# Patient Record
Sex: FEMALE | Race: WHITE | NOT HISPANIC OR LATINO | Employment: OTHER | URBAN - METROPOLITAN AREA
[De-identification: names, ages, dates, MRNs, and addresses within clinical notes are randomized per-mention and may not be internally consistent; named-entity substitution may affect disease eponyms.]

---

## 2021-04-17 ENCOUNTER — APPOINTMENT (OUTPATIENT)
Dept: MRI IMAGING | Facility: HOSPITAL | Age: 61
End: 2021-04-17

## 2021-04-17 ENCOUNTER — HOSPITAL ENCOUNTER (INPATIENT)
Facility: HOSPITAL | Age: 61
LOS: 1 days | Discharge: LEFT BEFORE DISCHARGE | End: 2021-04-18
Attending: FAMILY MEDICINE | Admitting: FAMILY MEDICINE

## 2021-04-17 ENCOUNTER — APPOINTMENT (OUTPATIENT)
Dept: GENERAL RADIOLOGY | Facility: HOSPITAL | Age: 61
End: 2021-04-17

## 2021-04-17 ENCOUNTER — APPOINTMENT (OUTPATIENT)
Dept: CT IMAGING | Facility: HOSPITAL | Age: 61
End: 2021-04-17

## 2021-04-17 DIAGNOSIS — I63.9 CEREBROVASCULAR ACCIDENT (CVA), UNSPECIFIED MECHANISM (HCC): ICD-10-CM

## 2021-04-17 DIAGNOSIS — Q67.0 FACIAL ASYMMETRY: Primary | ICD-10-CM

## 2021-04-17 DIAGNOSIS — R29.898 WEAKNESS OF EXTREMITY: ICD-10-CM

## 2021-04-17 LAB
ABO GROUP BLD: NORMAL
ALBUMIN SERPL-MCNC: 3.6 G/DL (ref 3.5–5.2)
ALBUMIN/GLOB SERPL: 1.2 G/DL
ALP SERPL-CCNC: 79 U/L (ref 39–117)
ALT SERPL W P-5'-P-CCNC: 10 U/L (ref 1–33)
ANION GAP SERPL CALCULATED.3IONS-SCNC: 10 MMOL/L (ref 5–15)
AST SERPL-CCNC: 18 U/L (ref 1–32)
BASOPHILS # BLD AUTO: 0.1 10*3/MM3 (ref 0–0.2)
BASOPHILS NFR BLD AUTO: 0.5 % (ref 0–1.5)
BILIRUB SERPL-MCNC: <0.2 MG/DL (ref 0–1.2)
BLD GP AB SCN SERPL QL: NEGATIVE
BUN SERPL-MCNC: 6 MG/DL (ref 8–23)
BUN/CREAT SERPL: 8 (ref 7–25)
CALCIUM SPEC-SCNC: 9.2 MG/DL (ref 8.6–10.5)
CHLORIDE SERPL-SCNC: 107 MMOL/L (ref 98–107)
CO2 SERPL-SCNC: 27 MMOL/L (ref 22–29)
CREAT SERPL-MCNC: 0.75 MG/DL (ref 0.57–1)
DEPRECATED RDW RBC AUTO: 55.1 FL (ref 37–54)
EOSINOPHIL # BLD AUTO: 0.2 10*3/MM3 (ref 0–0.4)
EOSINOPHIL NFR BLD AUTO: 1.6 % (ref 0.3–6.2)
ERYTHROCYTE [DISTWIDTH] IN BLOOD BY AUTOMATED COUNT: 17.3 % (ref 12.3–15.4)
GFR SERPL CREATININE-BSD FRML MDRD: 79 ML/MIN/1.73
GLOBULIN UR ELPH-MCNC: 3.1 GM/DL
GLUCOSE BLDC GLUCOMTR-MCNC: 95 MG/DL (ref 70–105)
GLUCOSE SERPL-MCNC: 100 MG/DL (ref 65–99)
HCT VFR BLD AUTO: 40.7 % (ref 34–46.6)
HGB BLD-MCNC: 13.7 G/DL (ref 12–15.9)
HOLD SPECIMEN: NORMAL
INR PPP: 0.97 (ref 0.93–1.1)
LYMPHOCYTES # BLD AUTO: 3.2 10*3/MM3 (ref 0.7–3.1)
LYMPHOCYTES NFR BLD AUTO: 25.8 % (ref 19.6–45.3)
MCH RBC QN AUTO: 30.1 PG (ref 26.6–33)
MCHC RBC AUTO-ENTMCNC: 33.7 G/DL (ref 31.5–35.7)
MCV RBC AUTO: 89.4 FL (ref 79–97)
MONOCYTES # BLD AUTO: 0.6 10*3/MM3 (ref 0.1–0.9)
MONOCYTES NFR BLD AUTO: 5.1 % (ref 5–12)
NEUTROPHILS NFR BLD AUTO: 67 % (ref 42.7–76)
NEUTROPHILS NFR BLD AUTO: 8.4 10*3/MM3 (ref 1.7–7)
NRBC BLD AUTO-RTO: 0.1 /100 WBC (ref 0–0.2)
PLATELET # BLD AUTO: 288 10*3/MM3 (ref 140–450)
PMV BLD AUTO: 8.5 FL (ref 6–12)
POTASSIUM SERPL-SCNC: 4.2 MMOL/L (ref 3.5–5.2)
PROT SERPL-MCNC: 6.7 G/DL (ref 6–8.5)
PROTHROMBIN TIME: 10.7 SECONDS (ref 9.6–11.7)
RBC # BLD AUTO: 4.55 10*6/MM3 (ref 3.77–5.28)
RH BLD: POSITIVE
SODIUM SERPL-SCNC: 144 MMOL/L (ref 136–145)
T&S EXPIRATION DATE: NORMAL
WBC # BLD AUTO: 12.5 10*3/MM3 (ref 3.4–10.8)
WHOLE BLOOD HOLD SPECIMEN: NORMAL
WHOLE BLOOD HOLD SPECIMEN: NORMAL

## 2021-04-17 PROCEDURE — 71045 X-RAY EXAM CHEST 1 VIEW: CPT

## 2021-04-17 PROCEDURE — 25010000002 DIPHENHYDRAMINE PER 50 MG: Performed by: NURSE PRACTITIONER

## 2021-04-17 PROCEDURE — 25010000002 LORAZEPAM PER 2 MG: Performed by: NURSE PRACTITIONER

## 2021-04-17 PROCEDURE — 86900 BLOOD TYPING SEROLOGIC ABO: CPT

## 2021-04-17 PROCEDURE — 70551 MRI BRAIN STEM W/O DYE: CPT

## 2021-04-17 PROCEDURE — 25010000002 METOCLOPRAMIDE PER 10 MG: Performed by: NURSE PRACTITIONER

## 2021-04-17 PROCEDURE — 86900 BLOOD TYPING SEROLOGIC ABO: CPT | Performed by: NURSE PRACTITIONER

## 2021-04-17 PROCEDURE — 82746 ASSAY OF FOLIC ACID SERUM: CPT | Performed by: PSYCHIATRY & NEUROLOGY

## 2021-04-17 PROCEDURE — 85610 PROTHROMBIN TIME: CPT | Performed by: NURSE PRACTITIONER

## 2021-04-17 PROCEDURE — 70544 MR ANGIOGRAPHY HEAD W/O DYE: CPT

## 2021-04-17 PROCEDURE — 86901 BLOOD TYPING SEROLOGIC RH(D): CPT | Performed by: NURSE PRACTITIONER

## 2021-04-17 PROCEDURE — 93005 ELECTROCARDIOGRAM TRACING: CPT | Performed by: NURSE PRACTITIONER

## 2021-04-17 PROCEDURE — 82962 GLUCOSE BLOOD TEST: CPT

## 2021-04-17 PROCEDURE — 99284 EMERGENCY DEPT VISIT MOD MDM: CPT

## 2021-04-17 PROCEDURE — 85025 COMPLETE CBC W/AUTO DIFF WBC: CPT | Performed by: NURSE PRACTITIONER

## 2021-04-17 PROCEDURE — 70547 MR ANGIOGRAPHY NECK W/O DYE: CPT

## 2021-04-17 PROCEDURE — 80053 COMPREHEN METABOLIC PANEL: CPT | Performed by: NURSE PRACTITIONER

## 2021-04-17 PROCEDURE — 86850 RBC ANTIBODY SCREEN: CPT | Performed by: NURSE PRACTITIONER

## 2021-04-17 PROCEDURE — 83036 HEMOGLOBIN GLYCOSYLATED A1C: CPT | Performed by: NURSE PRACTITIONER

## 2021-04-17 PROCEDURE — 82607 VITAMIN B-12: CPT | Performed by: PSYCHIATRY & NEUROLOGY

## 2021-04-17 PROCEDURE — 70450 CT HEAD/BRAIN W/O DYE: CPT

## 2021-04-17 PROCEDURE — 86901 BLOOD TYPING SEROLOGIC RH(D): CPT

## 2021-04-17 RX ORDER — SODIUM CHLORIDE 0.9 % (FLUSH) 0.9 %
10 SYRINGE (ML) INJECTION AS NEEDED
Status: DISCONTINUED | OUTPATIENT
Start: 2021-04-17 | End: 2021-04-19 | Stop reason: HOSPADM

## 2021-04-17 RX ORDER — DIPHENHYDRAMINE HYDROCHLORIDE 50 MG/ML
25 INJECTION INTRAMUSCULAR; INTRAVENOUS ONCE
Status: COMPLETED | OUTPATIENT
Start: 2021-04-17 | End: 2021-04-17

## 2021-04-17 RX ORDER — METOCLOPRAMIDE HYDROCHLORIDE 5 MG/ML
10 INJECTION INTRAMUSCULAR; INTRAVENOUS ONCE
Status: COMPLETED | OUTPATIENT
Start: 2021-04-17 | End: 2021-04-17

## 2021-04-17 RX ORDER — LORAZEPAM 2 MG/ML
0.5 INJECTION INTRAMUSCULAR ONCE
Status: COMPLETED | OUTPATIENT
Start: 2021-04-17 | End: 2021-04-17

## 2021-04-17 RX ADMIN — DIPHENHYDRAMINE HYDROCHLORIDE 25 MG: 50 INJECTION, SOLUTION INTRAMUSCULAR; INTRAVENOUS at 20:29

## 2021-04-17 RX ADMIN — SODIUM CHLORIDE 1000 ML: 9 INJECTION, SOLUTION INTRAVENOUS at 20:30

## 2021-04-17 RX ADMIN — LORAZEPAM 0.5 MG: 2 INJECTION INTRAMUSCULAR; INTRAVENOUS at 19:23

## 2021-04-17 RX ADMIN — METOCLOPRAMIDE HYDROCHLORIDE 10 MG: 5 INJECTION INTRAMUSCULAR; INTRAVENOUS at 20:30

## 2021-04-17 NOTE — ED PROVIDER NOTES
Subjective   History: Patient is a 60-year-old female complains of left arm numbness and facial asymmetry since 4/9/2020.  States the facial asymmetry has been constant with numbness to the left arm comes and goes but mainly remains.  States she has had increasing falls over the last week.  3 AM she woke her  up and was having slurred speech.  Reports she has had increased frontal headaches over the last week.  Currently has frontal headache nonradiating ache, constant, not take any medications for pain.  No recent illness cough congestion chest pain short of breath abdominal pain no recent change in medications no blood thinners.  States she had 2 strokes approximately 6 years ago without any residual.  Was not placed on any medications post stroke.      Onset: 4/9/2021  Location: Left side of face   Duration: Constant  Character: Droop  Aggravating/Alleviating factors: None  Radiation not applicable   Severity: Moderate to severe            Review of Systems   Constitutional: Negative for chills, fatigue and fever.   HENT: Negative for congestion, sore throat, tinnitus and trouble swallowing.    Eyes: Negative for photophobia, discharge and visual disturbance.   Respiratory: Negative for cough and shortness of breath.    Cardiovascular: Negative for chest pain.   Gastrointestinal: Negative for abdominal pain, diarrhea, nausea and vomiting.   Genitourinary: Negative for dysuria, frequency and urgency.   Musculoskeletal: Negative for back pain and myalgias.   Skin: Negative for rash.   Neurological: Positive for facial asymmetry, speech difficulty, weakness, numbness and headaches. Negative for dizziness.   Psychiatric/Behavioral: Negative for confusion.       History reviewed. No pertinent past medical history.    Allergies   Allergen Reactions   • Aspirin Other (See Comments)   • Bee Venom Anaphylaxis   • Ciprofloxacin Headache   • Contrast Dye Anaphylaxis   • Levofloxacin Rash   • Meperidine Shortness Of  Breath   • Promethazine Shortness Of Breath   • Pyridium [Phenazopyridine] Unknown - High Severity   • Vancomycin Anaphylaxis   • Azithromycin Rash       Past Surgical History:   Procedure Laterality Date   • GALLBLADDER SURGERY     • HYSTERECTOMY     • JOINT REPLACEMENT     • THYROIDECTOMY, PARTIAL     • TONSILLECTOMY     • TUBAL ABDOMINAL LIGATION         History reviewed. No pertinent family history.    Social History     Socioeconomic History   • Marital status:      Spouse name: Not on file   • Number of children: Not on file   • Years of education: Not on file   • Highest education level: Not on file           Objective   Physical Exam  Vitals reviewed.   Constitutional:       General: She is not in acute distress.     Appearance: She is normal weight.   HENT:      Head: Normocephalic and atraumatic.      Left Ear: Tympanic membrane normal.      Nose: Nose normal.      Mouth/Throat:      Mouth: Mucous membranes are moist.      Pharynx: Oropharynx is clear.   Eyes:      General: No visual field deficit.     Extraocular Movements: Extraocular movements intact.      Pupils: Pupils are equal, round, and reactive to light.   Cardiovascular:      Rate and Rhythm: Normal rate.      Pulses: Normal pulses.      Heart sounds: Normal heart sounds. No murmur heard.     Pulmonary:      Effort: Pulmonary effort is normal.      Breath sounds: Normal breath sounds.   Abdominal:      General: Abdomen is flat. Bowel sounds are normal.      Palpations: Abdomen is soft.   Musculoskeletal:         General: Normal range of motion.      Cervical back: Normal range of motion and neck supple.   Skin:     General: Skin is warm and dry.      Findings: No rash.   Neurological:      Mental Status: She is alert and oriented to person, place, and time.      GCS: GCS eye subscore is 4. GCS verbal subscore is 5. GCS motor subscore is 6.      Cranial Nerves: Cranial nerve deficit, dysarthria and facial asymmetry present.      Sensory:  "Sensory deficit present.      Motor: Weakness and pronator drift present.      Coordination: Finger-Nose-Finger Test and Heel to Shin Test normal.      Comments: Decreased nasolabial fold decreased tightening with closure of bilateral eyes to left eye.  Decreased sensation left upper extremity.  Drift to left arm and left leg without touching the bed.   Psychiatric:         Mood and Affect: Mood normal.         Behavior: Behavior normal.         Thought Content: Thought content normal.         Judgment: Judgment normal.         Procedures           ED Course  /60   Pulse 96   Temp 98 °F (36.7 °C) (Oral)   Resp 14   Ht 175.3 cm (69\")   Wt 80.1 kg (176 lb 9.4 oz)   SpO2 93%   Breastfeeding No   BMI 26.08 kg/m²   Labs Reviewed   COMPREHENSIVE METABOLIC PANEL - Abnormal; Notable for the following components:       Result Value    Glucose 100 (*)     BUN 6 (*)     All other components within normal limits    Narrative:     GFR Normal >60  Chronic Kidney Disease <60  Kidney Failure <15     CBC WITH AUTO DIFFERENTIAL - Abnormal; Notable for the following components:    WBC 12.50 (*)     RDW 17.3 (*)     RDW-SD 55.1 (*)     Neutrophils, Absolute 8.40 (*)     Lymphocytes, Absolute 3.20 (*)     All other components within normal limits   PROTIME-INR - Normal   POCT GLUCOSE FINGERSTICK - Normal   RAINBOW DRAW    Narrative:     The following orders were created for panel order [place] Draw.  Procedure                               Abnormality         Status                     ---------                               -----------         ------                     Light Blue Top[[id number]]                                   Final result               Green Top (Gel)[[id number]]                                                             Lavender Top[[id number]]                                     Final result               Gold Top - SST[[id number]]                                   Final result                 Please " view results for these tests on the individual orders.   POCT GLUCOSE FINGERSTICK   TYPE AND SCREEN   BB ARMBAND CHECK   CBC AND DIFFERENTIAL    Narrative:     The following orders were created for panel order CBC & Differential.  Procedure                               Abnormality         Status                     ---------                               -----------         ------                     CBC Auto Differential[[id number]]        Abnormal            Final result                 Please view results for these tests on the individual orders.   LIGHT BLUE TOP   LAVENDER TOP   GOLD TOP - SST     Medications   sodium chloride 0.9 % flush 10 mL (has no administration in time range)   LORazepam (ATIVAN) injection 0.5 mg (0.5 mg Intravenous Given 4/17/21 1923)   sodium chloride 0.9 % bolus 1,000 mL (1,000 mL Intravenous New Bag 4/17/21 2030)   diphenhydrAMINE (BENADRYL) injection 25 mg (25 mg Intravenous Given 4/17/21 2029)   metoclopramide (REGLAN) injection 10 mg (10 mg Intravenous Given 4/17/21 2030)     MRI Angiogram Head Without Contrast    Result Date: 4/17/2021  Attenuation of the right M2 and more distal branches of the right MCA compatible with occlusion or high-grade stenosis related to recent right MCA infarct. Attenuation of the distal right DESIREE near the region of infarct on recent MRI compatible with occlusion or high-grade stenosis. Tiny 1-2 mm conical outpouching arising from the left internal carotid artery in the expected location of the posterior communicating artery. This commonly reflects the infundibular origin of the posterior communicating artery, although a tiny aneurysm cannot be excluded by MRA. CTA offered for better evaluation as clinically warranted. Electronically signed by:  Chandan Hansen  4/17/2021 9:42 PM    MRI Brain Without Contrast    Result Date: 4/17/2021  1. Diffusion restriction involving the right paramedian frontal lobe (DESIREE territory), the right parietal postcentral  gyrus (MCA territory), the right temporal lobe (MCA territory), and a small focus along the right occipital cortex (PCA territory) favoring acute to early subacute infarcts. Given the distribution and pattern, an embolic phenomena is a concern. There is an approximately 1 cm diameter hemorrhage in the right temporal lobe infarct. 2. Mild-moderate white matter T2 prolongation favoring chronic microvascular ischemic changes, particularly within the right centrum semiovale. 3. Mild-moderate mastoid opacification the right, nonspecific. 4. Remote right frontal lobe infarct changes. These findings were discussed with practitioner Betty on 4/17/2021 7:05 PM ([place] time [place]). Electronically signed by:  Dennis Conway M.D.  4/17/2021 7:10 PM    XR Chest 1 View    Result Date: 4/17/2021  1.Mild cardiomegaly. 2.No radiographic findings of acute pulmonary abnormality.  Electronically Signed By-Yanick Sanchez MD On:4/17/2021 7:07 PM This report was finalized on [id number] by  Yanick Sanchez MD.    CT Head Without Contrast Stroke Protocol    Result Date: 4/17/2021  1. Hyperdensity with surrounding edema in the right temporal lobe. The hyperdensity measures 1.0 x 1.3 cm. Possible considerations include a small hemorrhagic insult or hemorrhagic mass. A cavernous malformation is also a potential etiology. MR brain with and without contrast can provide additional characterization. 2. Right frontal lobe encephalomalacia consistent with a remote insult. Electronically signed by:  Dennis Conway M.D.  4/17/2021 6:55 PM      ED Course as of Apr 18 0139   Sat Apr 17, 2021 2124 Request results were called to me from radiologist including multiple areas of infarct of the right paramedian right parietal temporal and a small focal area of occipital lobe.  Concerning area of hemorrhage to right temporal lobe 1 x 1.3 cm.  Emergently called with neurology on-call, Dr. Caceres spoke with him he wished to have CTA head and neck.   Patient anaphylactic reaction to contrast dye 1986 cardiac arrest has not been premedicated or had IV dye since then.  [facility]-was notified will do MRA head and neck.  MRI team called back in for emergent imaging.  Consult neurosurgeon.    [KJ]   2130 Spoke with Dr. Amaral, neurosurgeon on-call requested a call back after further imaging completed.  He will consult with patient first thing in the a.m.    [KJ]   Sun Apr 18, 2021   0123 Call results to Dr. Amaral as he requested discussed results of MRI with him.  He was informed there was no dye used as patient refused per MRI technician.  He recommended calling [facility]  stroke team to get their opinion on intervention versus conservative treatment and staying here.  I did speak with on-call physician for [facility] stroke team he stated patient was too far out as symptoms have been going on for 9 days and that it was too late for intervention at this time.  Recommended staying at [facility], [place], operated by [facility] for conservative treatment.    [KJ]      ED Course User Index  [KJ] Betty Huber, APRN                                         MDM     I examined the patient using the appropriate personal protective equipment.      DISPOSITION:   Chart Review:  Comorbidity:  has no past medical history on file.    ECG: interpreted by ER physician ron and reviewed by myself: Sinus rhythm rate 72 no ST elevation or ectopy no previous to compare.  Labs: CBC WBC 12.5 CMP glucose 100 otherwise unremarkable.    Imaging: Was interpreted by physician and reviewed by myself:  MRI Angiogram Head Without Contrast    Result Date: 4/17/2021  Attenuation of the right M2 and more distal branches of the right MCA compatible with occlusion or high-grade stenosis related to recent right MCA infarct. Attenuation of the distal right DESIREE near the region of infarct on recent MRI compatible with occlusion or high-grade stenosis. Tiny 1-2 mm conical outpouching arising from the left internal carotid artery  in the expected location of the posterior communicating artery. This commonly reflects the infundibular origin of the posterior communicating artery, although a tiny aneurysm cannot be excluded by MRA. CTA offered for better evaluation as clinically warranted. Electronically signed by:  Chandan Hansen  4/17/2021 9:42 PM    MRI Brain Without Contrast    Result Date: 4/17/2021  1. Diffusion restriction involving the right paramedian frontal lobe (DESIREE territory), the right parietal postcentral gyrus (MCA territory), the right temporal lobe (MCA territory), and a small focus along the right occipital cortex (PCA territory) favoring acute to early subacute infarcts. Given the distribution and pattern, an embolic phenomena is a concern. There is an approximately 1 cm diameter hemorrhage in the right temporal lobe infarct. 2. Mild-moderate white matter T2 prolongation favoring chronic microvascular ischemic changes, particularly within the right centrum semiovale. 3. Mild-moderate mastoid opacification the right, nonspecific. 4. Remote right frontal lobe infarct changes. These findings were discussed with practitioner Betty on 4/17/2021 7:05 PM ([place] time [place]). Electronically signed by:  Dennis Conway M.D.  4/17/2021 7:10 PM    XR Chest 1 View    Result Date: 4/17/2021  1.Mild cardiomegaly. 2.No radiographic findings of acute pulmonary abnormality.  Electronically Signed By-Yanick Sanchez MD On:4/17/2021 7:07 PM This report was finalized on [id number] by  Yanick Sanchez MD.    CT Head Without Contrast Stroke Protocol    Result Date: 4/17/2021  1. Hyperdensity with surrounding edema in the right temporal lobe. The hyperdensity measures 1.0 x 1.3 cm. Possible considerations include a small hemorrhagic insult or hemorrhagic mass. A cavernous malformation is also a potential etiology. MR brain with and without contrast can provide additional characterization. 2. Right frontal lobe encephalomalacia consistent with a  remote insult. Electronically signed by:  Dennis Conway M.D.  4/17/2021 6:55 PM      Disposition/Treatment:    Patient presented to ER with her  after having symptoms since 4/9/2021.  Mainly consisting of facial asymmetry left arm numbness and tingling as well as increased falls.  As of 03 100 this a.m. patient developed slurred speech.  On arrival to ER patient was alert and oriented x3 did complain of a frontal lobe headache that she states she has had on and off all week.  Patient denies any history of drug abuse states she takes gabapentin and recently placed on BuSpar denies any other medications.  Patient chest x-ray shows mild cardiomegaly otherwise negative for any acute pulmonary abnormalities.  CT head showedHyperdensity with surrounding edema in the right temporal lobe. The hyperdensity measures 1.0 x 1.3 cm. Possible considerations include a small hemorrhagic insult or hemorrhagic mass. A cavernous malformation is also a potential etiology.     Right frontal lobe encephalomalacia consistent with a remote insult.  MRI brain without contrast showed multiple areas of infarct including right paramedian right parietal right temporal and small focus area along the occipital region.There is an approximately 1 cm diameter hemorrhage in the right temporal lobe infarct.   Dr. Lopez, neurologist on-call was consulted after MRI was resulted he recommended CTA head and neck but patient had a history of anaphylaxis in 1986 and wanted to cardiac arrest with IV dye.  Therefore MRA head and neck was ordered.  I also spoke with neurosurgeon on-call, Dr. Amaral updated him on patient complaint current results.  He wanted me to call back with the MRA results once completed and read.  According to MR take patient refused contrast for her MRA head and neck therefore both were completed without contrast.  I did inform Dr. Amaral of this when I called him back giving him the results.  MRI results were as  follows:Attenuation of the right M2 and more distal branches of the right MCA compatible with occlusion or high-grade stenosis related to recent right MCA infarct. Attenuation of the distal right DESIREE near the region of infarct on recent MRI compatible with occlusion or high-grade stenosis.Tiny 1-2 mm conical outpouching arising from the left internal carotid artery in the expected location of the posterior communicating artery. This commonly reflects the infundibular origin of the posterior communicating artery, although a tiny aneurysm cannot be excluded by MRA.  Dr. Wolff recommended I call 5 disease, stroke team to get their opinion on possible intervention for embolectomy, stent placement and/or conservative treatment of the patient.  I did speak with on-call [facility] stroke team MD, he stated patient symptoms were too far out and they were not a candidate for intervention at this time recommended conservative treatment.  Patient be admitted for further evaluation by neurology without intervention at this time although patient had multiple areas of infarct noted with an area of hemorrhage to the right temporal lobe 1 x 1.3 cm.  Dr. Amaral is aware that patient may not be transferred and he stated he would check regarding patient status in the a.m., Dr. Caceres also stated he will check for patient status in the a.m.  Patient was admitted to [facility] as patient symptoms remain unchanged throughout course of ER.  She was resting comfortably on reevaluation remained AxO x3.  Final diagnoses:   Facial asymmetry   Weakness of extremity   Cerebrovascular accident (CVA), unspecified mechanism (CMS/HCC)       ED Disposition  ED Disposition     ED Disposition Condition Comment    Decision to Admit  Level of Care: Med/Surg [1]   Diagnosis: Facial asymmetry [558834]   Admitting Physician: MURRAY SINGH [[id number]]   Attending Physician: MURRAY SINGH [[id number]]   Isolate for COVID?: No [0]   Certification: I  Certify That Inpatient Hospital Services Are Medically Necessary For Greater Than 2 Midnights            No follow-up provider specified.       Medication List      No changes were made to your prescriptions during this visit.          Betty Huber, APRN  04/18/21 0524

## 2021-04-18 ENCOUNTER — APPOINTMENT (OUTPATIENT)
Dept: CARDIOLOGY | Facility: HOSPITAL | Age: 61
End: 2021-04-18

## 2021-04-18 VITALS
SYSTOLIC BLOOD PRESSURE: 137 MMHG | HEIGHT: 69 IN | RESPIRATION RATE: 22 BRPM | DIASTOLIC BLOOD PRESSURE: 80 MMHG | OXYGEN SATURATION: 96 % | BODY MASS INDEX: 26.07 KG/M2 | WEIGHT: 176 LBS | HEART RATE: 74 BPM | TEMPERATURE: 98 F

## 2021-04-18 PROBLEM — I63.9 CEREBRAL INFARCT (HCC): Status: ACTIVE | Noted: 2021-04-18

## 2021-04-18 PROBLEM — Q67.0 FACIAL ASYMMETRY: Status: ACTIVE | Noted: 2021-04-18

## 2021-04-18 LAB
AMPHET+METHAMPHET UR QL: NEGATIVE
BACTERIA UR QL AUTO: ABNORMAL /HPF
BARBITURATES UR QL SCN: NEGATIVE
BENZODIAZ UR QL SCN: POSITIVE
BH CV ECHO MEAS - ACS: 1.9 CM
BH CV ECHO MEAS - AO MAX PG (FULL): 0.04 MMHG
BH CV ECHO MEAS - AO MAX PG: 5.7 MMHG
BH CV ECHO MEAS - AO MEAN PG (FULL): 0.21 MMHG
BH CV ECHO MEAS - AO MEAN PG: 3 MMHG
BH CV ECHO MEAS - AO ROOT AREA (BSA CORRECTED): 1.6
BH CV ECHO MEAS - AO ROOT AREA: 7.8 CM^2
BH CV ECHO MEAS - AO ROOT DIAM: 3.2 CM
BH CV ECHO MEAS - AO V2 MAX: 118.9 CM/SEC
BH CV ECHO MEAS - AO V2 MEAN: 81 CM/SEC
BH CV ECHO MEAS - AO V2 VTI: 24.1 CM
BH CV ECHO MEAS - AORTIC HR: 69.6 BPM
BH CV ECHO MEAS - AORTIC R-R: 0.86 SEC
BH CV ECHO MEAS - ASC AORTA: 3.2 CM
BH CV ECHO MEAS - AVA(I,A): 3.2 CM^2
BH CV ECHO MEAS - AVA(I,D): 3.2 CM^2
BH CV ECHO MEAS - AVA(V,A): 3.2 CM^2
BH CV ECHO MEAS - AVA(V,D): 3.2 CM^2
BH CV ECHO MEAS - BSA(HAYCOCK): 2 M^2
BH CV ECHO MEAS - BSA: 2 M^2
BH CV ECHO MEAS - BZI_BMI: 26 KILOGRAMS/M^2
BH CV ECHO MEAS - BZI_METRIC_HEIGHT: 175.3 CM
BH CV ECHO MEAS - BZI_METRIC_WEIGHT: 79.8 KG
BH CV ECHO MEAS - CI(AO): 6.7 L/MIN/M^2
BH CV ECHO MEAS - CI(LVOT): 2.8 L/MIN/M^2
BH CV ECHO MEAS - CO(AO): 13.1 L/MIN
BH CV ECHO MEAS - CO(LVOT): 5.4 L/MIN
BH CV ECHO MEAS - EDV(CUBED): 80.6 ML
BH CV ECHO MEAS - EDV(MOD-SP4): 79.5 ML
BH CV ECHO MEAS - EDV(TEICH): 84 ML
BH CV ECHO MEAS - EF(CUBED): 59.7 %
BH CV ECHO MEAS - EF(MOD-BP): 60 %
BH CV ECHO MEAS - EF(MOD-SP4): 60 %
BH CV ECHO MEAS - EF(TEICH): 51.5 %
BH CV ECHO MEAS - ESV(CUBED): 32.5 ML
BH CV ECHO MEAS - ESV(MOD-SP4): 31.8 ML
BH CV ECHO MEAS - ESV(TEICH): 40.7 ML
BH CV ECHO MEAS - FS: 26.1 %
BH CV ECHO MEAS - IVS/LVPW: 0.93
BH CV ECHO MEAS - IVSD: 1.1 CM
BH CV ECHO MEAS - LA DIMENSION(2D): 3.5 CM
BH CV ECHO MEAS - LA DIMENSION: 3.7 CM
BH CV ECHO MEAS - LA/AO: 1.2
BH CV ECHO MEAS - LV DIASTOLIC VOL/BSA (35-75): 40.6 ML/M^2
BH CV ECHO MEAS - LV MASS(C)D: 179.5 GRAMS
BH CV ECHO MEAS - LV MASS(C)DI: 91.7 GRAMS/M^2
BH CV ECHO MEAS - LV MAX PG: 5.6 MMHG
BH CV ECHO MEAS - LV MEAN PG: 2.8 MMHG
BH CV ECHO MEAS - LV SYSTOLIC VOL/BSA (12-30): 16.2 ML/M^2
BH CV ECHO MEAS - LV V1 MAX: 118.5 CM/SEC
BH CV ECHO MEAS - LV V1 MEAN: 78.8 CM/SEC
BH CV ECHO MEAS - LV V1 VTI: 24.5 CM
BH CV ECHO MEAS - LVIDD: 4.3 CM
BH CV ECHO MEAS - LVIDS: 3.2 CM
BH CV ECHO MEAS - LVOT AREA: 3.2 CM^2
BH CV ECHO MEAS - LVOT DIAM: 2 CM
BH CV ECHO MEAS - LVPWD: 1.2 CM
BH CV ECHO MEAS - MV A MAX VEL: 62.3 CM/SEC
BH CV ECHO MEAS - MV DEC SLOPE: 417.6 CM/SEC^2
BH CV ECHO MEAS - MV DEC TIME: 0.22 SEC
BH CV ECHO MEAS - MV E MAX VEL: 93.7 CM/SEC
BH CV ECHO MEAS - MV E/A: 1.5
BH CV ECHO MEAS - MV MAX PG: 3.1 MMHG
BH CV ECHO MEAS - MV MEAN PG: 1.4 MMHG
BH CV ECHO MEAS - MV V2 MAX: 87.5 CM/SEC
BH CV ECHO MEAS - MV V2 MEAN: 54.6 CM/SEC
BH CV ECHO MEAS - MV V2 VTI: 27.6 CM
BH CV ECHO MEAS - MVA(VTI): 2.8 CM^2
BH CV ECHO MEAS - PA ACC TIME: 0.08 SEC
BH CV ECHO MEAS - PA MAX PG (FULL): 1.3 MMHG
BH CV ECHO MEAS - PA MAX PG: 4.3 MMHG
BH CV ECHO MEAS - PA MEAN PG (FULL): 0.83 MMHG
BH CV ECHO MEAS - PA MEAN PG: 2.3 MMHG
BH CV ECHO MEAS - PA PR(ACCEL): 44.7 MMHG
BH CV ECHO MEAS - PA V2 MAX: 103.1 CM/SEC
BH CV ECHO MEAS - PA V2 MEAN: 72.1 CM/SEC
BH CV ECHO MEAS - PA V2 VTI: 24.7 CM
BH CV ECHO MEAS - PULM A REVS DUR: 0.07 SEC
BH CV ECHO MEAS - PULM A REVS VEL: 21.4 CM/SEC
BH CV ECHO MEAS - PULM DIAS VEL: 41.4 CM/SEC
BH CV ECHO MEAS - PULM S/D: 1.4
BH CV ECHO MEAS - PULM SYS VEL: 59.3 CM/SEC
BH CV ECHO MEAS - RAP SYSTOLE: 3 MMHG
BH CV ECHO MEAS - RV MAX PG: 2.9 MMHG
BH CV ECHO MEAS - RV MEAN PG: 1.5 MMHG
BH CV ECHO MEAS - RV V1 MAX: 85.5 CM/SEC
BH CV ECHO MEAS - RV V1 MEAN: 54.5 CM/SEC
BH CV ECHO MEAS - RV V1 VTI: 17.6 CM
BH CV ECHO MEAS - RVDD: 2.8 CM
BH CV ECHO MEAS - RVSP: 18.6 MMHG
BH CV ECHO MEAS - SI(AO): 96.1 ML/M^2
BH CV ECHO MEAS - SI(CUBED): 24.6 ML/M^2
BH CV ECHO MEAS - SI(LVOT): 39.7 ML/M^2
BH CV ECHO MEAS - SI(MOD-SP4): 24.4 ML/M^2
BH CV ECHO MEAS - SI(TEICH): 22.1 ML/M^2
BH CV ECHO MEAS - SV(AO): 187.9 ML
BH CV ECHO MEAS - SV(CUBED): 48.1 ML
BH CV ECHO MEAS - SV(LVOT): 77.7 ML
BH CV ECHO MEAS - SV(MOD-SP4): 47.7 ML
BH CV ECHO MEAS - SV(TEICH): 43.3 ML
BH CV ECHO MEAS - TR MAX VEL: 187.6 CM/SEC
BH CV XLRA MEAS LEFT CCA RATIO VEL: 73.9 CM/SEC
BH CV XLRA MEAS LEFT DIST CCA EDV: 19.3 CM/SEC
BH CV XLRA MEAS LEFT DIST CCA PSV: 73.9 CM/SEC
BH CV XLRA MEAS LEFT DIST ICA EDV: -24.9 CM/SEC
BH CV XLRA MEAS LEFT DIST ICA PSV: -65.2 CM/SEC
BH CV XLRA MEAS LEFT ICA RATIO VEL: -77.7 CM/SEC
BH CV XLRA MEAS LEFT ICA/CCA RATIO: -1.1
BH CV XLRA MEAS LEFT PROX CCA EDV: 16.2 CM/SEC
BH CV XLRA MEAS LEFT PROX CCA PSV: 73.3 CM/SEC
BH CV XLRA MEAS LEFT PROX ECA PSV: -98.2 CM/SEC
BH CV XLRA MEAS LEFT PROX ICA EDV: -27.3 CM/SEC
BH CV XLRA MEAS LEFT PROX ICA PSV: -77.7 CM/SEC
BH CV XLRA MEAS LEFT PROX SCLA PSV: 180 CM/SEC
BH CV XLRA MEAS LEFT VERTEBRAL A EDV: -15.5 CM/SEC
BH CV XLRA MEAS LEFT VERTEBRAL A PSV: -57.2 CM/SEC
BH CV XLRA MEAS RIGHT CCA RATIO VEL: 80.8 CM/SEC
BH CV XLRA MEAS RIGHT DIST CCA EDV: 11.2 CM/SEC
BH CV XLRA MEAS RIGHT DIST CCA PSV: 62.1 CM/SEC
BH CV XLRA MEAS RIGHT DIST ICA EDV: -25.2 CM/SEC
BH CV XLRA MEAS RIGHT DIST ICA PSV: -102 CM/SEC
BH CV XLRA MEAS RIGHT ICA RATIO VEL: -120 CM/SEC
BH CV XLRA MEAS RIGHT ICA/CCA RATIO: -1.5
BH CV XLRA MEAS RIGHT PROX CCA EDV: 13.7 CM/SEC
BH CV XLRA MEAS RIGHT PROX CCA PSV: 80.8 CM/SEC
BH CV XLRA MEAS RIGHT PROX ECA PSV: -139 CM/SEC
BH CV XLRA MEAS RIGHT PROX ICA EDV: -28.6 CM/SEC
BH CV XLRA MEAS RIGHT PROX ICA PSV: -120 CM/SEC
BH CV XLRA MEAS RIGHT PROX SCLA PSV: 155 CM/SEC
BH CV XLRA MEAS RIGHT VERTEBRAL A EDV: -10.3 CM/SEC
BH CV XLRA MEAS RIGHT VERTEBRAL A PSV: -42.3 CM/SEC
BILIRUB UR QL STRIP: NEGATIVE
CANNABINOIDS SERPL QL: NEGATIVE
CHOLEST SERPL-MCNC: 120 MG/DL (ref 0–200)
CLARITY UR: ABNORMAL
COCAINE UR QL: NEGATIVE
COLOR UR: YELLOW
ETHANOL UR QL: <0.01 %
FOLATE SERPL-MCNC: 5.68 NG/ML (ref 4.78–24.2)
GLUCOSE UR STRIP-MCNC: NEGATIVE MG/DL
HDLC SERPL-MCNC: 44 MG/DL (ref 40–60)
HGB UR QL STRIP.AUTO: NEGATIVE
HYALINE CASTS UR QL AUTO: ABNORMAL /LPF
KETONES UR QL STRIP: NEGATIVE
LDLC SERPL CALC-MCNC: 58 MG/DL (ref 0–100)
LDLC/HDLC SERPL: 1.3 {RATIO}
LEUKOCYTE ESTERASE UR QL STRIP.AUTO: ABNORMAL
METHADONE UR QL SCN: NEGATIVE
NITRITE UR QL STRIP: POSITIVE
OPIATES UR QL: NEGATIVE
OXYCODONE UR QL SCN: NEGATIVE
PH UR STRIP.AUTO: 6 [PH] (ref 5–8)
PROT UR QL STRIP: NEGATIVE
QT INTERVAL: 382 MS
RBC # UR: ABNORMAL /HPF
REF LAB TEST METHOD: ABNORMAL
SARS-COV-2 ORF1AB RESP QL NAA+PROBE: NOT DETECTED
SP GR UR STRIP: 1.01 (ref 1–1.03)
SQUAMOUS #/AREA URNS HPF: ABNORMAL /HPF
TRIGL SERPL-MCNC: 94 MG/DL (ref 0–150)
TSH SERPL DL<=0.05 MIU/L-ACNC: 2.51 UIU/ML (ref 0.27–4.2)
UROBILINOGEN UR QL STRIP: ABNORMAL
VIT B12 BLD-MCNC: 346 PG/ML (ref 211–946)
VLDLC SERPL-MCNC: 18 MG/DL (ref 5–40)
WBC UR QL AUTO: ABNORMAL /HPF
WHOLE BLOOD HOLD SPECIMEN: NORMAL

## 2021-04-18 PROCEDURE — 93306 TTE W/DOPPLER COMPLETE: CPT

## 2021-04-18 PROCEDURE — 80307 DRUG TEST PRSMV CHEM ANLYZR: CPT | Performed by: NURSE PRACTITIONER

## 2021-04-18 PROCEDURE — 82077 ASSAY SPEC XCP UR&BREATH IA: CPT | Performed by: NURSE PRACTITIONER

## 2021-04-18 PROCEDURE — 87088 URINE BACTERIA CULTURE: CPT | Performed by: NURSE PRACTITIONER

## 2021-04-18 PROCEDURE — 84443 ASSAY THYROID STIM HORMONE: CPT | Performed by: NURSE PRACTITIONER

## 2021-04-18 PROCEDURE — 99222 1ST HOSP IP/OBS MODERATE 55: CPT | Performed by: PSYCHIATRY & NEUROLOGY

## 2021-04-18 PROCEDURE — 87186 SC STD MICRODIL/AGAR DIL: CPT | Performed by: NURSE PRACTITIONER

## 2021-04-18 PROCEDURE — U0005 INFEC AGEN DETEC AMPLI PROBE: HCPCS | Performed by: FAMILY MEDICINE

## 2021-04-18 PROCEDURE — 81001 URINALYSIS AUTO W/SCOPE: CPT | Performed by: NURSE PRACTITIONER

## 2021-04-18 PROCEDURE — 92610 EVALUATE SWALLOWING FUNCTION: CPT

## 2021-04-18 PROCEDURE — 80061 LIPID PANEL: CPT | Performed by: NURSE PRACTITIONER

## 2021-04-18 PROCEDURE — 99223 1ST HOSP IP/OBS HIGH 75: CPT | Performed by: FAMILY MEDICINE

## 2021-04-18 PROCEDURE — 87086 URINE CULTURE/COLONY COUNT: CPT | Performed by: NURSE PRACTITIONER

## 2021-04-18 PROCEDURE — 93306 TTE W/DOPPLER COMPLETE: CPT | Performed by: INTERNAL MEDICINE

## 2021-04-18 PROCEDURE — 93880 EXTRACRANIAL BILAT STUDY: CPT

## 2021-04-18 PROCEDURE — U0004 COV-19 TEST NON-CDC HGH THRU: HCPCS | Performed by: FAMILY MEDICINE

## 2021-04-18 RX ORDER — BUSPIRONE HYDROCHLORIDE 10 MG/1
10 TABLET ORAL 3 TIMES DAILY
COMMUNITY

## 2021-04-18 RX ORDER — IPRATROPIUM BROMIDE AND ALBUTEROL SULFATE 2.5; .5 MG/3ML; MG/3ML
3 SOLUTION RESPIRATORY (INHALATION)
Status: DISCONTINUED | OUTPATIENT
Start: 2021-04-18 | End: 2021-04-19 | Stop reason: HOSPADM

## 2021-04-18 RX ORDER — SODIUM CHLORIDE 0.9 % (FLUSH) 0.9 %
3-10 SYRINGE (ML) INJECTION AS NEEDED
Status: DISCONTINUED | OUTPATIENT
Start: 2021-04-18 | End: 2021-04-19 | Stop reason: HOSPADM

## 2021-04-18 RX ORDER — BUSPIRONE HYDROCHLORIDE 10 MG/1
10 TABLET ORAL 3 TIMES DAILY
Status: DISCONTINUED | OUTPATIENT
Start: 2021-04-18 | End: 2021-04-19 | Stop reason: HOSPADM

## 2021-04-18 RX ORDER — ALBUTEROL SULFATE 90 UG/1
2 AEROSOL, METERED RESPIRATORY (INHALATION) EVERY 4 HOURS PRN
COMMUNITY

## 2021-04-18 RX ORDER — BUTALBITAL, ACETAMINOPHEN AND CAFFEINE 50; 325; 40 MG/1; MG/1; MG/1
2 TABLET ORAL EVERY 6 HOURS PRN
Status: DISCONTINUED | OUTPATIENT
Start: 2021-04-18 | End: 2021-04-19 | Stop reason: HOSPADM

## 2021-04-18 RX ORDER — SODIUM CHLORIDE 9 MG/ML
100 INJECTION, SOLUTION INTRAVENOUS CONTINUOUS
Status: DISCONTINUED | OUTPATIENT
Start: 2021-04-18 | End: 2021-04-19 | Stop reason: HOSPADM

## 2021-04-18 RX ORDER — ONDANSETRON 2 MG/ML
4 INJECTION INTRAMUSCULAR; INTRAVENOUS EVERY 6 HOURS PRN
Status: DISCONTINUED | OUTPATIENT
Start: 2021-04-18 | End: 2021-04-19 | Stop reason: HOSPADM

## 2021-04-18 RX ORDER — BISACODYL 10 MG
10 SUPPOSITORY, RECTAL RECTAL DAILY PRN
Status: DISCONTINUED | OUTPATIENT
Start: 2021-04-18 | End: 2021-04-19 | Stop reason: HOSPADM

## 2021-04-18 RX ORDER — ATORVASTATIN CALCIUM 40 MG/1
80 TABLET, FILM COATED ORAL NIGHTLY
Status: DISCONTINUED | OUTPATIENT
Start: 2021-04-18 | End: 2021-04-18

## 2021-04-18 RX ORDER — GABAPENTIN 800 MG/1
800 TABLET ORAL 4 TIMES DAILY
COMMUNITY

## 2021-04-18 RX ORDER — SODIUM CHLORIDE 0.9 % (FLUSH) 0.9 %
3 SYRINGE (ML) INJECTION EVERY 12 HOURS SCHEDULED
Status: DISCONTINUED | OUTPATIENT
Start: 2021-04-18 | End: 2021-04-19 | Stop reason: HOSPADM

## 2021-04-18 RX ORDER — ATORVASTATIN CALCIUM 40 MG/1
40 TABLET, FILM COATED ORAL NIGHTLY
Status: DISCONTINUED | OUTPATIENT
Start: 2021-04-18 | End: 2021-04-19 | Stop reason: HOSPADM

## 2021-04-18 RX ORDER — LORAZEPAM 0.5 MG/1
0.5 TABLET ORAL ONCE
Status: COMPLETED | OUTPATIENT
Start: 2021-04-18 | End: 2021-04-18

## 2021-04-18 RX ORDER — ALBUTEROL SULFATE 2.5 MG/3ML
2.5 SOLUTION RESPIRATORY (INHALATION) EVERY 4 HOURS PRN
Status: DISCONTINUED | OUTPATIENT
Start: 2021-04-18 | End: 2021-04-19 | Stop reason: HOSPADM

## 2021-04-18 RX ORDER — ACETAMINOPHEN 325 MG/1
650 TABLET ORAL EVERY 6 HOURS PRN
Status: DISCONTINUED | OUTPATIENT
Start: 2021-04-18 | End: 2021-04-19 | Stop reason: HOSPADM

## 2021-04-18 RX ORDER — ACETAMINOPHEN AND CODEINE PHOSPHATE 300; 30 MG/1; MG/1
1 TABLET ORAL 3 TIMES DAILY PRN
Status: ON HOLD | COMMUNITY
End: 2021-06-28 | Stop reason: SDUPTHER

## 2021-04-18 RX ORDER — ALBUTEROL SULFATE 90 UG/1
2 AEROSOL, METERED RESPIRATORY (INHALATION) EVERY 4 HOURS PRN
Status: DISCONTINUED | OUTPATIENT
Start: 2021-04-18 | End: 2021-04-18

## 2021-04-18 RX ORDER — LORAZEPAM 0.5 MG/1
0.5 TABLET ORAL EVERY 12 HOURS PRN
Status: DISCONTINUED | OUTPATIENT
Start: 2021-04-18 | End: 2021-04-19 | Stop reason: HOSPADM

## 2021-04-18 RX ADMIN — SODIUM CHLORIDE, PRESERVATIVE FREE 3 ML: 5 INJECTION INTRAVENOUS at 20:30

## 2021-04-18 RX ADMIN — BUSPIRONE HYDROCHLORIDE 10 MG: 10 TABLET ORAL at 17:35

## 2021-04-18 RX ADMIN — ATORVASTATIN CALCIUM 80 MG: 40 TABLET, FILM COATED ORAL at 03:37

## 2021-04-18 RX ADMIN — LORAZEPAM 0.5 MG: 0.5 TABLET ORAL at 17:35

## 2021-04-18 RX ADMIN — SODIUM CHLORIDE 100 ML/HR: 0.9 INJECTION, SOLUTION INTRAVENOUS at 04:11

## 2021-04-18 RX ADMIN — BUTALBITAL, ACETAMINOPHEN AND CAFFEINE 2 TABLET: 50; 325; 40 TABLET ORAL at 20:30

## 2021-04-18 RX ADMIN — BUTALBITAL, ACETAMINOPHEN AND CAFFEINE 2 TABLET: 50; 325; 40 TABLET ORAL at 09:06

## 2021-04-18 RX ADMIN — ACETAMINOPHEN 650 MG: 325 TABLET ORAL at 04:16

## 2021-04-18 RX ADMIN — LORAZEPAM 0.5 MG: 0.5 TABLET ORAL at 09:24

## 2021-04-18 RX ADMIN — BUSPIRONE HYDROCHLORIDE 10 MG: 10 TABLET ORAL at 20:27

## 2021-04-18 RX ADMIN — BUSPIRONE HYDROCHLORIDE 10 MG: 10 TABLET ORAL at 09:06

## 2021-04-18 RX ADMIN — SODIUM CHLORIDE, PRESERVATIVE FREE 3 ML: 5 INJECTION INTRAVENOUS at 04:10

## 2021-04-18 RX ADMIN — ATORVASTATIN CALCIUM 40 MG: 40 TABLET, FILM COATED ORAL at 20:27

## 2021-04-18 RX ADMIN — SODIUM CHLORIDE, PRESERVATIVE FREE 3 ML: 5 INJECTION INTRAVENOUS at 09:06

## 2021-04-18 RX ADMIN — BUTALBITAL, ACETAMINOPHEN AND CAFFEINE 2 TABLET: 50; 325; 40 TABLET ORAL at 14:38

## 2021-04-18 NOTE — THERAPY EVALUATION
Acute Care - Speech Language Pathology   Swallow Initial Evaluation  Ehsan     Patient Name: Magdalena Gamboa  : 1960  MRN: [id number]  Today's Date: 2021               Admit Date: 2021    Visit Dx:     ICD-10-CM ICD-9-CM   1. Facial asymmetry  Q67.0 754.0   2. Weakness of extremity  R29.898 729.89   3. Cerebrovascular accident (CVA), unspecified mechanism (CMS/HCC)  I63.9 434.91     Patient Active Problem List   Diagnosis   • Facial asymmetry   • Cerebral infarct (CMS/HCC)     Past Medical History:   Diagnosis Date   • AMI (acute myocardial infarction) (CMS/HCC)    • Chronic back pain    • Mood disorder (CMS/HCC)    • Stroke (CMS/HCC)    • Substance abuse (CMS/HCC)      Past Surgical History:   Procedure Laterality Date   • CHOLECYSTECTOMY     • GALLBLADDER SURGERY     • HYSTERECTOMY     • JOINT REPLACEMENT     • THYROIDECTOMY, PARTIAL     • TONSILLECTOMY     • TUBAL ABDOMINAL LIGATION          SWALLOW EVALUATION (last 72 hours)      SLP Adult Swallow Evaluation     Row Name 21 0800       Rehab Evaluation    Document Type  evaluation  -AB    Subjective Information  complains of headache  -AB    Patient Observations  alert;cooperative;agree to therapy  -AB    Patient/Family/Caregiver Comments/Observations  pt up in bed, pleasant, cooperative  -AB    Care Plan Review  evaluation/treatment results reviewed;care plan/treatment goals reviewed;risks/benefits reviewed;current/potential barriers reviewed;patient/other agree to care plan  -AB    Patient Effort  good  -AB    Symptoms Noted During/After Treatment  none  -AB       General Information    Patient Profile Reviewed  yes  -AB    Pertinent History Of Current Problem  60 y.o. female admitted w/L sided weakness, slurred speech. MRI revealing diffusion restriction involving R paramedial frontal lobe, R parietal postrcentral gyrus, R temporal lobe, R occipital cortex. Pt endorses prior hc of CVA in [place], w/o significant residuals, memory  worsening since that time period  -AB    Current Method of Nutrition  NPO  -AB    Precautions/Limitations, Vision  WFL reports no visual involvement  -AB    Precautions/Limitations, Hearing  WFL  -AB    Prior Level of Function-Communication  WFL reports mild memory impairments  -AB    Prior Level of Function-Swallowing  no diet consistency restrictions;regular textures;thin liquids  -AB    Plans/Goals Discussed with  patient;agreed upon  -AB    Barriers to Rehab  none identified  -AB       Pain    Additional Documentation  Pain Scale: Numbers Pre/Post-Treatment (Group)  -AB       Pain Scale: Numbers Pre/Post-Treatment    Pretreatment Pain Rating  8/10  -AB    Posttreatment Pain Rating  8/10  -AB    Pain Location  head  -AB       Oral Motor Structure and Function    Oral Lesions or Structural Abnormalities and/or variants  None  -AB    Dentition Assessment  upper dentures/partial in place;edentulous maxillary endentulous  -AB    Secretion Management  WNL/WFL  -AB    Mucosal Quality  moist, healthy  -AB    Gag Response  -- DNA  -AB    Volitional Swallow  WFL  -AB    Volitional Cough  WFL  -AB       Oral Musculature and Cranial Nerve Assessment    Oral Motor General Assessment  oral labial or buccal impairment;lingual impairment  -AB    Oral Labial or Buccal Impairment, Detail, Cranial Nerve VII (Facial):  left labial droop  -AB    Lingual Impairment, Detail. Cranial Nerves IX, XII (Glossopharyngeal and Hypoglossal)  reduced strength right  -AB       Clinical Swallow Eval    Clinical Swallow Evaluation Summary  Bedside swallow evaluation completed. Pt initially passed in ED, however pronounced L facial droop documented and remains present upon eval today. Oral phase mildly impaired. Mastication and A-P transport are timely and efficient. No oral residuals. Mild L anterior loss with regular solids, presumably secondary to weakness, decreased mandibular ROM. Pharyngeal phase with digital palpation performed, suggestive  of timely initiation. No overt s/s aspiration across all trials.   -AB       Clinical Impression    SLP Swallowing Diagnosis  mild;oral dysphagia  -AB    Functional Impact  risk of aspiration/pneumonia  -AB    Rehab Potential/Prognosis, Swallowing  good, to achieve stated therapy goals  -AB    Swallow Criteria for Skilled Therapeutic Interventions Met  demonstrates skilled criteria  -AB       Recommendations    Therapy Frequency (Swallow)  1 day per week  -AB    Predicted Duration Therapy Intervention (Days)  1 week  -AB    SLP Diet Recommendation  regular textures;thin liquids  -AB    Recommended Diagnostics  SLE/Cog/Motor Speech Evaluation diet tolerance x1  -AB    Recommended Precautions and Strategies  upright posture during/after eating;small bites of food and sips of liquid  -AB    Oral Care Recommendations  Oral Care BID/PRN  -AB    SLP Rec. for Method of Medication Administration  with thin liquids  -AB    Monitor for Signs of Aspiration  yes;notify SLP if any concerns  -AB    Anticipated Discharge Disposition (SLP)  unknown  -AB       Swallow Goals (SLP)    Oral Nutrition/Hydration Goal Selection (SLP)  oral nutrition/hydration, SLP goal 1  -AB       Oral Nutrition/Hydration Goal 1 (SLP)    Oral Nutrition/Hydration Goal 1, SLP  Tolerate L/R diet without oral stage difficulties or complications associated with aspiration   -AB    Time Frame (Oral Nutrition/Hydration Goal 1, SLP)  short term goal (STG)  -AB      User Key  (r) = Recorded By, (t) = Taken By, (c) = Cosigned By    Initials Name Effective Dates    Marge Garcia, MS CCC-SLP 10/05/20 -           EDUCATION  The patient has been educated in the following areas:   Dysphagia (Swallowing Impairment).    SLP Recommendation and Plan  Recommend: regular and thin liquid diet. Medications: with thin liquids. Compensations: small bites/sips, upright for all PO.   SLP recommends dysphagia follow up x1 secondary to mild oral impairment and to reinforce  carryover of strategies.   Speech language cognitive evaluation appears indicated-pt reports increased memory problems since prior CVA. Speech with decreased intensity and articulatory precision, however pt states this is baseline. Pt declines cognitive workup this date secondary to severe head pain-notifies neurologist as SLP exits room.    SLP GOALS     Row Name 04/18/21 0800             Oral Nutrition/Hydration Goal 1 (SLP)    Oral Nutrition/Hydration Goal 1, SLP  Tolerate L/R diet without oral stage difficulties or complications associated with aspiration   -AB      Time Frame (Oral Nutrition/Hydration Goal 1, SLP)  short term goal (STG)  -AB        User Key  (r) = Recorded By, (t) = Taken By, (c) = Cosigned By    Initials Name Provider Type    AB Marge Mendez, MS CCC-SLP Speech and Language Pathologist             Time Calculation:       Therapy Charges for Today     Code Description Service Date Service Provider Modifiers Qty    [id number]  ST EVAL ORAL PHARYNG SWALLOW 4 4/18/2021 Marge Mendez, MS CCC-SLP GN 1        PPE:  Patient was not wearing a face mask during this therapy encounter. Therapist used appropriate personal protective equipment including mask, eye protection and gloves.  Mask used was standard procedure mask. Appropriate PPE was worn during the entire therapy session. The patient coughed during this evaluation. Therapist was within 6 feet for 15 minutes or more during the evaluation. Hand hygiene was completed before and after therapy session. Patient is not in enhanced droplet precautions.            Marge Mendez MS CCC-SLP  4/18/2021

## 2021-04-18 NOTE — PLAN OF CARE
Goal Outcome Evaluation:  Plan of Care Reviewed With: patient  Progress: no change  Outcome Summary: Bedside swallow evaluation completed. Pt initially passed in ED, however pronounced L facial droop documented and remains present upon eval today. Oral phase mildly impaired. Mastication and A-P transport are timely and efficient. No oral residuals. Mild L anterior loss with regular solids, presumably secondary to weakness, decreased mandibular ROM. Pharyngeal phase with digital palpation performed, suggestive of timely initiation. No overt s/s aspiration across all trials.     Recommend: regular and thin liquid diet. Medications: with thin liquids. Compensations: small bites/sips, upright for all PO.     SLP recommends dysphagia follow up x1 secondary to mild oral impairment and to reinforce carryover of strategies.     Speech language cognitive evaluation appears indicated-pt reports increased memory problems since prior CVA w/new R MCA infarct. Speech with decreased intensity and articulatory precision, however pt states this is baseline. Pt declines cognitive workup this date secondary to severe head pain-notifies neurologist as SLP exits room.

## 2021-04-18 NOTE — PLAN OF CARE
Problem: Fall Injury Risk  Goal: Absence of Fall and Fall-Related Injury  Outcome: Ongoing, Not Progressing  Intervention: Promote Injury-Free Environment  Recent Flowsheet Documentation  Taken 4/18/2021 0258 by Aminah Woodruff, RN  Safety Promotion/Fall Prevention:   safety round/check completed   room organization consistent   lighting adjusted   fall prevention program maintained   clutter free environment maintained   assistive device/personal items within reach     Problem: Fall Injury Risk  Goal: Absence of Fall and Fall-Related Injury  4/18/2021 0512 by Aminah Woodruff, RN  Outcome: Ongoing, Not Progressing  4/18/2021 0511 by Aminah Woodruff, RN  Outcome: Ongoing, Not Progressing  Intervention: Promote Injury-Free Environment  Recent Flowsheet Documentation  Taken 4/18/2021 0258 by Aminah Woodruff, RN  Safety Promotion/Fall Prevention:   safety round/check completed   room organization consistent   lighting adjusted   fall prevention program maintained   clutter free environment maintained   assistive device/personal items within reach   Goal Outcome Evaluation:  Plan of Care Reviewed With: patient  Progress: no change

## 2021-04-18 NOTE — CONSULTS
Primary Care Provider: Provider, No Known     Consult requested by: DIXIE Blakely    Reason for Consultation: Neurological evaluation/strokes  History taken from: patient chart    Chief complaint: Left arm weakness       SUBJECTIVE:    History of present illness:    The patient is a very pleasant 60-year-old right-handed white female who is having some problems since April 9 but yesterday things got worse and she told her boyfriend who told her to come to the emergency room    She was evaluated as a possible stroke and she ended up with a head CT which showed some changes and this was followed by MRI which shows multiple lesions on the right side both in the DESIREE and MCA territory.  There are signs of diffusion-weighted images changes representing stroke but there is an area in the temporal lobe which could be other possibilities and there is hemorrhage.  Neurosurgery has been consulted    She has severe allergy to IV contrast as per the patient so MRA of the head and neck was done and the MRI of the head was impressive with distal M1 and M2 showing severe narrowing or decreased flow and also the MRA of the neck was really not diagnostic considering so much artifact    Her symptoms are headaches, left facial weakness and left arm weakness and she denies any leg weakness but she does have some leg weakness    She denies IV drugs  She says that in her youth she has used marijuana  She is allergic to aspirin  No seizures        As per admitting, 60-year-old female presented with complaints of intermittent left arm numbness, left leg weakness and facial asymmetry since April 9, 2021.  She also reports frequent falls the past week but denies any injury.  She reports she awakened her partner at 3 AM and was having slurred speech.  She also reports she has a moderate frontal headache.  She denies any visual changes, syncope, chest pain or dizziness.  Upon exam she does have a left-sided facial asymmetry but  "is awake alert and answers appropriately.  She reports a past medical history of a stroke about 5 or 6 years ago with no residual disability.  She also reports she is allergic to contrast media and had a cardiac arrest as a result of receiving IV contrast dye.  Following diagnostics were performed:     CT head     \"1. Hyperdensity with surrounding edema in the right temporal lobe. The hyperdensity measures 1.0 x 1.3 cm. Possible considerations include a small hemorrhagic insult or hemorrhagic mass. A cavernous malformation is also a potential etiology. MR brain   with and without contrast can provide additional characterization.   2. Right frontal lobe encephalomalacia consistent with a remote insult.   \"     MRA of the head: An area in the temporal lobe region which has some hemorrhagic change in its rounded in the center     \"IMPRESSION:  Attenuation of the right M2 and more distal branches of the right MCA compatible with occlusion or high-grade stenosis related to recent right MCA infarct.    Attenuation of the distal right DESIREE near the region of infarct on recent MRI compatible with occlusion or high-grade stenosis.   Tiny 1-2 mm conical outpouching arising from the left internal carotid artery in the expected location of the posterior communicating artery. This commonly reflects the infundibular origin of the posterior communicating artery, although a tiny aneurysm   cannot be excluded by MRA.\"      MRA of the neck:     \"IMPRESSION:  Motion degraded examination.   Limited evaluation of the proximal internal carotid arteries due to motion where high-grade stenoss cannot be excluded. CTA recommended for better evaluation in this person with recent right anterior circulation infarct.   Elsewhere, evidence of moderate or severe stenosis, occlusion, or dissection of the visualized segments of the carotid or vertebral arteries in the neck.\"     MRI brain:     \"IMPRESSION:      1. Diffusion restriction involving the " "right paramedian frontal lobe (DESIREE territory), the right parietal postcentral gyrus (MCA territory), the right temporal lobe (MCA territory), and a small focus along the right occipital cortex (PCA territory)   favoring acute to early subacute infarcts. Given the distribution and pattern, an embolic phenomena is a concern. There is an approximately 1 cm diameter hemorrhage in the right temporal lobe infarct.   2. Mild-moderate white matter T2 prolongation favoring chronic microvascular ischemic changes, particularly within the right centrum semiovale.   3. Mild-moderate mastoid opacification the right, nonspecific.  4. Remote right frontal lobe infarct changes.     Chest x-ray:     \"IMPRESSION:  1.Mild cardiomegaly.  2.No radiographic findings of acute pulmonary abnormality.\"     Both Dr. Jordan of neurology and Dr. Amaral of neurosurgery were consulted from the emergency department.  [facility] was consulted and given timeframe of onset of symptoms and presentation, conservative management here was advised.     Labs show WBC of 12.5, she is afebrile.  Urinalysis pending.  She will be admitted for further evaluation and treatment with neurology and neurosurgery following.          Review of Systems   No fever chills rigors or sweats  No weight issues  No sleep problems  HEENT:  No speech problem, vision changes, facial asymmetry or pain, or neck problem but mild headaches  Chest: No chest pain, clubbing, cyanosis, orthopnea palpitations  Pulmonary:  No shortness of air, cough or expectoration  Abdomen:  No swelling/tension, constipation,diarrhea or pain  No genitourinary symptoms  Extremity problems as discussed  Chronic back problems  No psychotic issues  Neurologic issues as discussed  No hematologic, dermatologic or endocrine problems          PATIENT HISTORY:  Past Medical History:   Diagnosis Date   • AMI (acute myocardial infarction) (CMS/[facility])    • Chronic back pain    • Mood disorder (CMS/[facility]) "    • Stroke (CMS/[facility])    • Substance abuse (CMS/[facility])    ,   Past Surgical History:   Procedure Laterality Date   • CHOLECYSTECTOMY     • GALLBLADDER SURGERY     • HYSTERECTOMY     • JOINT REPLACEMENT     • THYROIDECTOMY, PARTIAL     • TONSILLECTOMY     • TUBAL ABDOMINAL LIGATION     ,   Family History   Problem Relation Age of Onset   • Heart disease Sister    ,   Social History     Tobacco Use   • Smoking status: Current Every Day Smoker     Packs/day: 1.00   • Smokeless tobacco: Never Used   • Tobacco comment: not interested   Vaping Use   • Vaping Use: Some days   • Substances: Nicotine   • Devices: Disposable, Pre-filled or refillable cartridge   Substance Use Topics   • Alcohol use: Not Currently   • Drug use: Not Currently   ,   Medications Prior to Admission   Medication Sig Dispense Refill Last Dose   • acetaminophen-codeine (TYLENOL #3) 300-30 MG per tablet Take 1 tablet by mouth 3 (Three) Times a Day As Needed for Moderate Pain .   4/17/2021 at Unknown time   • albuterol sulfate  (90 Base) MCG/ACT inhaler Inhale 2 puffs Every 4 (Four) Hours As Needed for Wheezing.   4/17/2021 at Unknown time   • busPIRone (BUSPAR) 10 MG tablet Take 10 mg by mouth 3 (Three) Times a Day.   4/17/2021 at Unknown time   • gabapentin (NEURONTIN) 800 MG tablet Take 800 mg by mouth 4 (Four) Times a Day.   4/17/2021 at Unknown time   • umeclidinium-vilanterol (Anoro Ellipta) 62.5-25 MCG/INH aerosol powder  inhaler Inhale 1 puff Daily.   4/17/2021 at Unknown time   , Scheduled Meds:  atorvastatin, 80 mg, Oral, Nightly  busPIRone, 10 mg, Oral, TID  ipratropium-albuterol, 3 mL, Nebulization, Q8H - RT  sodium chloride, 3 mL, Intravenous, Q12H    , Continuous Infusions:  sodium chloride, 100 mL/hr, Last Rate: 100 mL/hr (04/18/21 0411)    , PRN Meds:  •  acetaminophen  •  albuterol  •  bisacodyl  •  ondansetron  •  sodium chloride  •  sodium chloride, Allergies:  Aspirin, Bee venom, Ciprofloxacin, Contrast dye, Levofloxacin,  Meperidine, Promethazine, Pyridium [phenazopyridine], Vancomycin, and Azithromycin    ________________________________________________________        OBJECTIVE:  Upon today's exam, patient is complaining of headaches but otherwise stable      Neurologic Exam    The patient is awake and alert and oriented x3  Normal speech but she is a bit concerned about her headaches     Cranial nerve examination demonstrate:  Full fields of vision to confrontation  Pupils are round, reactive to light and accommodation and size of about 3 mm  No ptosis or nystagmus  Funduscopic examination was not successful  Eye movements are conjugate     Sensation on the face and scalp are normal  Muscles of mastication are normal and symmetric  Muscles of  facial expression demonstrate left-sided facial weakness moderate hearing is intact bilaterally  Head turning and shoulder shrugs were unremarkable  Tongue was midline  I could not visualize her oropharynx or uvula     Motor examination:  Normal bulk, tone and strength was 5/5 on the right side, left upper extremity was 4+, left lower extremity proximally was 5 - distally 4+  No fasciculations     Sensory examination:  Intact for soft touch, pain and position sensation  Romberg was not evaluated     Reflexes:  0/4     Coordination:  Normal finger-to-nose to finger, rapid alternating movements and toe to finger on the right side, slow on the left side     Gait:  Deferred     Toe signs:  [place]    ________________________________________________________   RESULTS REVIEW:    VITAL SIGNS:   Temp:  [98 °F (36.7 °C)-98.5 °F (36.9 °C)] 98.3 °F (36.8 °C)  Heart Rate:  [75-96] 76  Resp:  [14-16] 16  BP: (120-173)/() 139/69     LABS:  WBC   Date Value Ref Range Status   04/17/2021 12.50 (H) 3.40 - 10.80 10*3/mm3 Final     RBC   Date Value Ref Range Status   04/17/2021 4.55 3.77 - 5.28 10*6/mm3 Final     Hemoglobin   Date Value Ref Range Status   04/17/2021 13.7 12.0 - 15.9 g/dL Final     Hematocrit    Date Value Ref Range Status   04/17/2021 40.7 34.0 - 46.6 % Final     MCV   Date Value Ref Range Status   04/17/2021 89.4 79.0 - 97.0 fL Final     MCH   Date Value Ref Range Status   04/17/2021 30.1 26.6 - 33.0 pg Final     MCHC   Date Value Ref Range Status   04/17/2021 33.7 31.5 - 35.7 g/dL Final     RDW   Date Value Ref Range Status   04/17/2021 17.3 (H) 12.3 - 15.4 % Final     RDW-SD   Date Value Ref Range Status   04/17/2021 55.1 (H) 37.0 - 54.0 fl Final     MPV   Date Value Ref Range Status   04/17/2021 8.5 6.0 - 12.0 fL Final     Platelets   Date Value Ref Range Status   04/17/2021 288 140 - 450 10*3/mm3 Final     Neutrophil %   Date Value Ref Range Status   04/17/2021 67.0 42.7 - 76.0 % Final     Lymphocyte %   Date Value Ref Range Status   04/17/2021 25.8 19.6 - 45.3 % Final     Monocyte %   Date Value Ref Range Status   04/17/2021 5.1 5.0 - 12.0 % Final     Eosinophil %   Date Value Ref Range Status   04/17/2021 1.6 0.3 - 6.2 % Final     Basophil %   Date Value Ref Range Status   04/17/2021 0.5 0.0 - 1.5 % Final     Neutrophils, Absolute   Date Value Ref Range Status   04/17/2021 8.40 (H) 1.70 - 7.00 10*3/mm3 Final     Lymphocytes, Absolute   Date Value Ref Range Status   04/17/2021 3.20 (H) 0.70 - 3.10 10*3/mm3 Final     Monocytes, Absolute   Date Value Ref Range Status   04/17/2021 0.60 0.10 - 0.90 10*3/mm3 Final     Eosinophils, Absolute   Date Value Ref Range Status   04/17/2021 0.20 0.00 - 0.40 10*3/mm3 Final     Basophils, Absolute   Date Value Ref Range Status   04/17/2021 0.10 0.00 - 0.20 10*3/mm3 Final     nRBC   Date Value Ref Range Status   04/17/2021 0.1 0.0 - 0.2 /100 WBC Final     Glucose   Date Value Ref Range Status   04/17/2021 100 (H) 65 - 99 mg/dL Final     BUN   Date Value Ref Range Status   04/17/2021 6 (L) 8 - 23 mg/dL Final     Creatinine   Date Value Ref Range Status   04/17/2021 0.75 0.57 - 1.00 mg/dL Final     Sodium   Date Value Ref Range Status   04/17/2021 144 136 - 145  mmol/L Final     Potassium   Date Value Ref Range Status   04/17/2021 4.2 3.5 - 5.2 mmol/L Final     Chloride   Date Value Ref Range Status   04/17/2021 107 98 - 107 mmol/L Final     CO2   Date Value Ref Range Status   04/17/2021 27.0 22.0 - 29.0 mmol/L Final     Calcium   Date Value Ref Range Status   04/17/2021 9.2 8.6 - 10.5 mg/dL Final     Total Protein   Date Value Ref Range Status   04/17/2021 6.7 6.0 - 8.5 g/dL Final     Albumin   Date Value Ref Range Status   04/17/2021 3.60 3.50 - 5.20 g/dL Final     ALT (SGPT)   Date Value Ref Range Status   04/17/2021 10 1 - 33 U/L Final     AST (SGOT)   Date Value Ref Range Status   04/17/2021 18 1 - 32 U/L Final     Alkaline Phosphatase   Date Value Ref Range Status   04/17/2021 79 39 - 117 U/L Final     Total Bilirubin   Date Value Ref Range Status   04/17/2021 <0.2 0.0 - 1.2 mg/dL Final     eGFR Non  Amer   Date Value Ref Range Status   04/17/2021 79 >60 mL/min/1.73 Final     BUN/Creatinine Ratio   Date Value Ref Range Status   04/17/2021 8.0 7.0 - 25.0 Final     Anion Gap   Date Value Ref Range Status   04/17/2021 10.0 5.0 - 15.0 mmol/L Final       Lab Results   Component Value Date    TSH 2.510 04/18/2021    LDL 58 04/18/2021         IMAGING STUDIES:  MRI Angiogram Head Without Contrast    Result Date: 4/17/2021  Attenuation of the right M2 and more distal branches of the right MCA compatible with occlusion or high-grade stenosis related to recent right MCA infarct. Attenuation of the distal right DESIREE near the region of infarct on recent MRI compatible with occlusion or high-grade stenosis. Tiny 1-2 mm conical outpouching arising from the left internal carotid artery in the expected location of the posterior communicating artery. This commonly reflects the infundibular origin of the posterior communicating artery, although a tiny aneurysm cannot be excluded by MRA. CTA offered for better evaluation as clinically warranted. Electronically signed by:  Chandan WEBER  Tyrone  4/17/2021 9:42 PM    MRI Angiogram Neck Without Contrast    Result Date: 4/18/2021  Motion degraded examination. Limited evaluation of the proximal internal carotid arteries due to motion where high-grade stenoses cannot be excluded. CTA recommended for better evaluation in this person with recent right anterior circulation infarct. Elsewhere, evidence of moderate or severe stenosis, occlusion, or dissection of the visualized segments of the carotid or vertebral arteries in the neck. Electronically signed by:  Chandan Hansen  4/17/2021 10:33 PM    MRI Brain Without Contrast    Result Date: 4/17/2021  1. Diffusion restriction involving the right paramedian frontal lobe (DESIREE territory), the right parietal postcentral gyrus (MCA territory), the right temporal lobe (MCA territory), and a small focus along the right occipital cortex (PCA territory) favoring acute to early subacute infarcts. Given the distribution and pattern, an embolic phenomena is a concern. There is an approximately 1 cm diameter hemorrhage in the right temporal lobe infarct. 2. Mild-moderate white matter T2 prolongation favoring chronic microvascular ischemic changes, particularly within the right centrum semiovale. 3. Mild-moderate mastoid opacification the right, nonspecific. 4. Remote right frontal lobe infarct changes. These findings were discussed with practitioner Betty on 4/17/2021 7:05 PM ([place] Electronically signed by:  Dennis Conway M.D.  4/17/2021 7:10 PM    XR Chest 1 View    Result Date: 4/17/2021  1.Mild cardiomegaly. 2.No radiographic findings of acute pulmonary abnormality.  Electronically Signed By-Yanick Sanchez MD On:4/17/2021 7:07 PM This report was finalized on [id number] by  Yanick Sanchez MD.    CT Head Without Contrast Stroke Protocol    Result Date: 4/17/2021  1. Hyperdensity with surrounding edema in the right temporal lobe. The hyperdensity measures 1.0 x 1.3 cm. Possible considerations  include a small hemorrhagic insult or hemorrhagic mass. A cavernous malformation is also a potential etiology. MR brain with and without contrast can provide additional characterization. 2. Right frontal lobe encephalomalacia consistent with a remote insult. Electronically signed by:  Dennis Conway M.D.  4/17/2021 6:55 PM      I reviewed the patient's new clinical results.      ________________________________________________________     PROBLEM LIST:    Facial asymmetry    Cerebral infarct (CMS/HCC)          Assessment/Plan   ASSESSMENT/PLAN:  Strokes, right side, embolic  Area of hemorrhage in the right side  Rule out other pathology    I would like to possibly reimage her MRA of the neck  2D echo but likely may need VARGHESE because I have multiple areas of stroke both DESIREE and MCA, though both on the right side, so right internal carotid disease has to be absolutely ruled out    We may not be able to aggressively anticoagulate because of her hemorrhage  Her presentation is very atypical and we have to look for other causes including coagulopathy and multiple reasons that can cause coagulopathy    Modification of stroke risk factors:   - Blood pressure should be less than 130/80 outpatient, HbA1c less than 6.5, LDL less than 70; b12>500 and smoking cessation if applicable. We would be grateful if the primary team / primary care physician would keep a close watch on the above targets.  - Stroke education  - Follow up with neurologist of choice      I discussed the patient's findings and my recommendations with patient    Olimpia Lopez MD  04/18/21  08:17 EDT

## 2021-04-18 NOTE — PLAN OF CARE
Goal Outcome Evaluation:     Progress: no change  Outcome Summary: Patient alert, oriented x4. Patient answers questions appropriately. Patient c/o headache 10/10 with little relief after Tylenol or Fioricet per patient. Patient states headache is unchanged since admission. Patient very anxious, tearful this shift, stating she wanted to go home and wanted to go smoke. RN offered Nicotine patch, patient refused stating it makes her more anxious. Ativan given 2x this shift, with decrease in anxiety noted. Patient up with SBA to BRP. No c/o with activity. NIH 8. Left hand noted to be edematous, difficulty moving fingers. IVF turned off at this time d/t swelling, patient refusal. UA, Echo, Head CT, Carotid US scheduled for this shift. Patient participated in plan of care with RN.      Problem: Fall Injury Risk  Goal: Absence of Fall and Fall-Related Injury  Outcome: Ongoing, Progressing  Intervention: Identify and Manage Contributors to Fall Injury Risk  Recent Flowsheet Documentation  Taken 4/18/2021 1630 by Vashti Huggins RN  Self-Care Promotion:   independence encouraged   BADL personal objects within reach   safe use of adaptive equipment encouraged  Taken 4/18/2021 1416 by Vashti Huggins, RN  Medication Review/Management: medications reviewed  Self-Care Promotion:   independence encouraged   BADL personal objects within reach   safe use of adaptive equipment encouraged  Taken 4/18/2021 1221 by Vashti Huggins RN  Medication Review/Management: medications reviewed  Self-Care Promotion:   independence encouraged   BADL personal objects within reach   safe use of adaptive equipment encouraged  Taken 4/18/2021 1040 by Vashti Huggins, RN  Medication Review/Management: medications reviewed  Taken 4/18/2021 0845 by Vashti Huggins, RN  Medication Review/Management: medications reviewed  Self-Care Promotion:   independence encouraged   BADL personal objects within reach   safe use of adaptive equipment  encouraged  Intervention: Promote Injury-Free Environment  Recent Flowsheet Documentation  Taken 4/18/2021 1630 by Vashti Huggins, RN  Safety Promotion/Fall Prevention:   safety round/check completed   room organization consistent   nonskid shoes/slippers when out of bed   lighting adjusted   fall prevention program maintained   clutter free environment maintained   assistive device/personal items within reach   activity supervised  Taken 4/18/2021 1416 by Vashti Huggins, RN  Safety Promotion/Fall Prevention:   safety round/check completed   room organization consistent   nonskid shoes/slippers when out of bed   mobility aid in reach   lighting adjusted   fall prevention program maintained   clutter free environment maintained   activity supervised   assistive device/personal items within reach  Taken 4/18/2021 1221 by Vashti Huggins, RN  Safety Promotion/Fall Prevention:   safety round/check completed   room organization consistent   nonskid shoes/slippers when out of bed   lighting adjusted   gait belt   fall prevention program maintained   clutter free environment maintained   assistive device/personal items within reach   activity supervised  Taken 4/18/2021 1040 by Vashti Huggins, RN  Safety Promotion/Fall Prevention:   safety round/check completed   room organization consistent   nonskid shoes/slippers when out of bed   lighting adjusted   gait belt   fall prevention program maintained   clutter free environment maintained   assistive device/personal items within reach   activity supervised  Taken 4/18/2021 0845 by Vashti Huggins, RN  Safety Promotion/Fall Prevention:   safety round/check completed   room organization consistent   nonskid shoes/slippers when out of bed   lighting adjusted   fall prevention program maintained   clutter free environment maintained   assistive device/personal items within reach   activity supervised     Problem: Skin Injury Risk Increased  Goal: Skin Health and  Integrity  Outcome: Ongoing, Progressing  Intervention: Optimize Skin Protection  Recent Flowsheet Documentation  Taken 4/18/2021 1630 by Vashti Huggins RN  Pressure Reduction Techniques:   frequent weight shift encouraged   rest period provided between sit times  Pressure Reduction Devices: pressure-redistributing mattress utilized  Skin Protection:   adhesive use limited   incontinence pads utilized   transparent dressing maintained   tubing/devices free from skin contact  Taken 4/18/2021 1221 by Vashti Huggins RN  Pressure Reduction Techniques:   frequent weight shift encouraged   rest period provided between sit times  Pressure Reduction Devices: pressure-redistributing mattress utilized  Skin Protection:   adhesive use limited   incontinence pads utilized   transparent dressing maintained   tubing/devices free from skin contact  Taken 4/18/2021 0845 by Vashti Huggins RN  Pressure Reduction Techniques:   frequent weight shift encouraged   rest period provided between sit times  Pressure Reduction Devices: pressure-redistributing mattress utilized  Skin Protection:   adhesive use limited   incontinence pads utilized   transparent dressing maintained   tubing/devices free from skin contact     Problem: Adult Inpatient Plan of Care  Goal: Plan of Care Review  Outcome: Ongoing, Progressing  Flowsheets  Taken 4/18/2021 1834 by Vashti Huggins RN  Progress: no change  Outcome Summary: Patient alert, oriented x4. Patient answers questions appropriately. Patient c/o headache 10/10 with little relief after Tylenol or Fioricet per patient. Patient states headache is unchanged since admission. Patient very anxious, tearful this shift, stating she wanted to go home and wanted to go smoke. RN offered Nicotine patch, patient refused stating it makes her more anxious. Ativan given 2x this shift, with decrease in anxiety noted. Patient up with SBA to BRP. No c/o with activity. NIH 8. Left hand noted to be edematous,  difficulty moving fingers. IVF turned off at this time d/t swelling, patient refusal. UA, Echo, Head CT, Carotid US scheduled for this shift. Patient participated in plan of care with RN.  Taken 4/18/2021 0841 by Marge Mendez MS CCC-SLP  Plan of Care Reviewed With: patient  Goal: Patient-Specific Goal (Individualized)  Outcome: Ongoing, Progressing  Goal: Absence of Hospital-Acquired Illness or Injury  Outcome: Ongoing, Progressing  Intervention: Identify and Manage Fall Risk  Flowsheets  Taken 4/18/2021 1700 by Krish Crawford  Safety Promotion/Fall Prevention:   assistive device/personal items within reach   clutter free environment maintained   nonskid shoes/slippers when out of bed   safety round/check completed   room organization consistent  Taken 4/18/2021 1630 by Vashti Huggins RN  Safety Promotion/Fall Prevention:   safety round/check completed   room organization consistent   nonskid shoes/slippers when out of bed   lighting adjusted   fall prevention program maintained   clutter free environment maintained   assistive device/personal items within reach   activity supervised  Taken 4/18/2021 1416 by Vashti Huggins RN  Safety Promotion/Fall Prevention:   safety round/check completed   room organization consistent   nonskid shoes/slippers when out of bed   mobility aid in reach   lighting adjusted   fall prevention program maintained   clutter free environment maintained   activity supervised   assistive device/personal items within reach  Taken 4/18/2021 1221 by Vashti Huggins, RN  Safety Promotion/Fall Prevention:   safety round/check completed   room organization consistent   nonskid shoes/slippers when out of bed   lighting adjusted   gait belt   fall prevention program maintained   clutter free environment maintained   assistive device/personal items within reach   activity supervised  Taken 4/18/2021 1040 by Vashti Huggins, RN  Safety Promotion/Fall Prevention:   safety round/check completed    room organization consistent   nonskid shoes/slippers when out of bed   lighting adjusted   gait belt   fall prevention program maintained   clutter free environment maintained   assistive device/personal items within reach   activity supervised  Taken 4/18/2021 0845 by Vashti uHggins RN  Safety Promotion/Fall Prevention:   safety round/check completed   room organization consistent   nonskid shoes/slippers when out of bed   lighting adjusted   fall prevention program maintained   clutter free environment maintained   assistive device/personal items within reach   activity supervised  Intervention: Prevent Skin Injury  Flowsheets  Taken 4/18/2021 1630 by Vashti Huggins RN  Skin Protection:   adhesive use limited   incontinence pads utilized   transparent dressing maintained   tubing/devices free from skin contact  Taken 4/18/2021 1221 by Vashti Huggins RN  Skin Protection:   adhesive use limited   incontinence pads utilized   transparent dressing maintained   tubing/devices free from skin contact  Taken 4/18/2021 0845 by Vashti Huggins RN  Skin Protection:   adhesive use limited   incontinence pads utilized   transparent dressing maintained   tubing/devices free from skin contact  Taken 4/18/2021 0258 by Aminah Woodruff RN  Body Position: position changed independently  Intervention: Prevent and Manage VTE (venous thromboembolism) Risk  Flowsheets (Taken 4/18/2021 1221)  VTE Prevention/Management:   bilateral   sequential compression devices on  Intervention: Prevent Infection  Flowsheets  Taken 4/18/2021 1700 by Krish Crawford  Infection Prevention:   environmental surveillance performed   hand hygiene promoted   personal protective equipment utilized  Taken 4/18/2021 1630 by Vashti Huggins RN  Infection Prevention:   equipment surfaces disinfected   hand hygiene promoted   personal protective equipment utilized   rest/sleep promoted   single patient room provided   visitors restricted/screened  Taken  4/18/2021 1416 by Vashti Huggins RN  Infection Prevention:   equipment surfaces disinfected   hand hygiene promoted   personal protective equipment utilized   rest/sleep promoted   single patient room provided   visitors restricted/screened  Taken 4/18/2021 1221 by Vashti Huggins RN  Infection Prevention:   equipment surfaces disinfected   hand hygiene promoted   personal protective equipment utilized   rest/sleep promoted   single patient room provided   visitors restricted/screened  Taken 4/18/2021 1040 by Vashti Huggins RN  Infection Prevention:   equipment surfaces disinfected   hand hygiene promoted   personal protective equipment utilized   rest/sleep promoted   single patient room provided   visitors restricted/screened  Taken 4/18/2021 0845 by Vashti Huggins RN  Infection Prevention:   equipment surfaces disinfected   hand hygiene promoted   personal protective equipment utilized   rest/sleep promoted   single patient room provided   visitors restricted/screened  Goal: Optimal Comfort and Wellbeing  Outcome: Ongoing, Progressing  Intervention: Provide Person-Centered Care  Flowsheets  Taken 4/18/2021 1630  Trust Relationship/Rapport:   care explained   choices provided   emotional support provided   empathic listening provided   reassurance provided   thoughts/feelings acknowledged  Taken 4/18/2021 1221  Trust Relationship/Rapport:   care explained   choices provided   emotional support provided   empathic listening provided   questions answered   questions encouraged   reassurance provided   thoughts/feelings acknowledged  Taken 4/18/2021 0845  Trust Relationship/Rapport:   care explained   choices provided   empathic listening provided   emotional support provided   questions answered   reassurance provided   thoughts/feelings acknowledged  Goal: Readiness for Transition of Care  Outcome: Ongoing, Progressing     Problem: Pain Acute  Goal: Optimal Pain Control  Outcome: Ongoing,  Progressing  Intervention: Develop Pain Management Plan  Flowsheets  Taken 4/18/2021 1221  Pain Management Interventions:   care clustered   quiet environment facilitated  Taken 4/18/2021 0845  Pain Management Interventions:   see MAR   quiet environment facilitated   relaxation techniques promoted  Intervention: Prevent or Manage Pain  Flowsheets  Taken 4/18/2021 1630  Sensory Stimulation Regulation:   care clustered   lighting decreased   quiet environment promoted  Sleep/Rest Enhancement:   relaxation techniques promoted   noise level reduced  Taken 4/18/2021 1221  Sensory Stimulation Regulation:   care clustered   lighting decreased   quiet environment promoted  Intervention: Optimize Psychosocial Wellbeing  Flowsheets  Taken 4/18/2021 1630  Supportive Measures:   active listening utilized   decision-making supported   goal setting facilitated   positive reinforcement provided   relaxation techniques promoted   verbalization of feelings encouraged  Diversional Activities: television  Taken 4/18/2021 1221  Supportive Measures:   active listening utilized   decision-making supported   goal setting facilitated   positive reinforcement provided   relaxation techniques promoted   self-care encouraged  Diversional Activities: smartphone  Taken 4/18/2021 0845  Supportive Measures:   active listening utilized   decision-making supported   goal setting facilitated   relaxation techniques promoted  Diversional Activities:   smartphone   television     Problem: Activity and Energy Impairment (Anxiety Signs/Symptoms)  Goal: Optimized Energy Level (Anxiety Signs/Symptoms)  Outcome: Ongoing, Progressing     Problem: Cognitive Impairment (Anxiety Signs/Symptoms)  Goal: Optimized Cognitive Function (Anxiety Signs/Symptoms)  Outcome: Ongoing, Progressing     Problem: Mood Impairment (Anxiety Signs/Symptoms)  Goal: Improved Mood Symptoms (Anxiety Signs/Symptoms)  Outcome: Ongoing, Progressing  Intervention: Optimize Emotion and  Mood  Flowsheets  Taken 4/18/2021 1630  Supportive Measures:   active listening utilized   decision-making supported   goal setting facilitated   positive reinforcement provided   relaxation techniques promoted   verbalization of feelings encouraged  Taken 4/18/2021 1221  Supportive Measures:   active listening utilized   decision-making supported   goal setting facilitated   positive reinforcement provided   relaxation techniques promoted   self-care encouraged  Taken 4/18/2021 0845  Supportive Measures:   active listening utilized   decision-making supported   goal setting facilitated   relaxation techniques promoted     Problem: Sleep Disturbance (Anxiety Signs/Symptoms)  Goal: Improved Sleep (Anxiety Signs/Symptoms)  Outcome: Ongoing, Progressing     Problem: Social, Occupational or Functional Impairment (Anxiety Signs/Symptoms)  Goal: Enhanced Social, Occupational or Functional Skills (Anxiety Signs/Symptoms)  Outcome: Ongoing, Progressing  Intervention: Promote Social, Occupational and Functional Ability  Flowsheets  Taken 4/18/2021 1630  Trust Relationship/Rapport:   care explained   choices provided   emotional support provided   empathic listening provided   reassurance provided   thoughts/feelings acknowledged  Taken 4/18/2021 1221  Trust Relationship/Rapport:   care explained   choices provided   emotional support provided   empathic listening provided   questions answered   questions encouraged   reassurance provided   thoughts/feelings acknowledged  Taken 4/18/2021 0845  Trust Relationship/Rapport:   care explained   choices provided   empathic listening provided   emotional support provided   questions answered   reassurance provided   thoughts/feelings acknowledged     Problem: Somatic Disturbance (Anxiety Signs/Symptoms)  Goal: Improved Somatic Symptoms (Anxiety Signs/Symptoms)  Outcome: Ongoing, Progressing     Problem: Balance Impairment (Functional Deficit)  Goal: Improved Balance and Postural  Control  Outcome: Ongoing, Progressing  Intervention: Optimize Balance and Safe Activity  Flowsheets  Taken 4/18/2021 1700 by Krish Crawford  Safety Promotion/Fall Prevention:   assistive device/personal items within reach   clutter free environment maintained   nonskid shoes/slippers when out of bed   safety round/check completed   room organization consistent  Taken 4/18/2021 1630 by Vashti Huggins, RN  Safety Promotion/Fall Prevention:   safety round/check completed   room organization consistent   nonskid shoes/slippers when out of bed   lighting adjusted   fall prevention program maintained   clutter free environment maintained   assistive device/personal items within reach   activity supervised  Taken 4/18/2021 1416 by Vashti Huggins, RN  Safety Promotion/Fall Prevention:   safety round/check completed   room organization consistent   nonskid shoes/slippers when out of bed   mobility aid in reach   lighting adjusted   fall prevention program maintained   clutter free environment maintained   activity supervised   assistive device/personal items within reach  Taken 4/18/2021 1221 by Vashti Huggins, RN  Safety Promotion/Fall Prevention:   safety round/check completed   room organization consistent   nonskid shoes/slippers when out of bed   lighting adjusted   gait belt   fall prevention program maintained   clutter free environment maintained   assistive device/personal items within reach   activity supervised  Taken 4/18/2021 1040 by Vashti Huggins, RN  Safety Promotion/Fall Prevention:   safety round/check completed   room organization consistent   nonskid shoes/slippers when out of bed   lighting adjusted   gait belt   fall prevention program maintained   clutter free environment maintained   assistive device/personal items within reach   activity supervised  Taken 4/18/2021 0845 by Vashti Huggins, RN  Safety Promotion/Fall Prevention:   safety round/check completed   room organization consistent   nonskid  shoes/slippers when out of bed   lighting adjusted   fall prevention program maintained   clutter free environment maintained   assistive device/personal items within reach   activity supervised     Problem: Cognitive Impairment (Functional Deficit)  Goal: Optimal Functional [place]verse  Outcome: Ongoing, Progressing  Intervention: Optimize Cognitive Function  Flowsheets  Taken 4/18/2021 1630  Sensory Stimulation Regulation:   care clustered   lighting decreased   quiet environment promoted  Reorientation Measures:   clock in view   glasses use encouraged  Self-Care Promotion:   independence encouraged   BADL personal objects within reach   safe use of adaptive equipment encouraged  Taken 4/18/2021 1416  Self-Care Promotion:   independence encouraged   BADL personal objects within reach   safe use of adaptive equipment encouraged  Taken 4/18/2021 1221  Sensory Stimulation Regulation:   care clustered   lighting decreased   quiet environment promoted  Reorientation Measures:   clock in view   glasses use encouraged  Self-Care Promotion:   independence encouraged   BADL personal objects within reach   safe use of adaptive equipment encouraged  Taken 4/18/2021 0845  Reorientation Measures:   clock in view   familiar social contact encouraged   glasses use encouraged  Environment Familiarity/Consistency: personal clothing/items utilized  Self-Care Promotion:   independence encouraged   BADL personal objects within reach   safe use of adaptive equipment encouraged     Problem: Coordination Impairment (Functional Deficit)  Goal: Optimal Coordination  Outcome: Ongoing, Progressing  Intervention: Optimize Motor Coordination and Functional Ability  Flowsheets  Taken 4/18/2021 1630  Self-Care Promotion:   independence encouraged   BADL personal objects within reach   safe use of adaptive equipment encouraged  Taken 4/18/2021 1416  Self-Care Promotion:   independence encouraged   BADL personal objects within reach   safe use  of adaptive equipment encouraged  Taken 4/18/2021 1221  Self-Care Promotion:   independence encouraged   BADL personal objects within reach   safe use of adaptive equipment encouraged  Taken 4/18/2021 0845  Self-Care Promotion:   independence encouraged   BADL personal objects within reach   safe use of adaptive equipment encouraged     Problem: Muscle Strength Impairment (Functional Deficit)  Goal: Improved Muscle Strength  Outcome: Ongoing, Progressing  Intervention: Optimize Muscle Strength  Flowsheets  Taken 4/18/2021 1630  Self-Care Promotion:   independence encouraged   BADL personal objects within reach   safe use of adaptive equipment encouraged  Taken 4/18/2021 1416  Self-Care Promotion:   independence encouraged   BADL personal objects within reach   safe use of adaptive equipment encouraged  Taken 4/18/2021 1221  Self-Care Promotion:   independence encouraged   BADL personal objects within reach   safe use of adaptive equipment encouraged  Taken 4/18/2021 0845  Self-Care Promotion:   independence encouraged   BADL personal objects within reach   safe use of adaptive equipment encouraged     Problem: Muscle Tone Impairment (Functional Deficit)  Goal: Improved Muscle Tone  Outcome: Ongoing, Progressing     Problem: Range of Motion Impairment (Functional Deficit)  Goal: Optimal Range of Motion  Outcome: Ongoing, Progressing     Problem: Sensory Impairment (Functional Deficit)  Goal: Compensation for Sensory Deficit  Outcome: Ongoing, Progressing  Intervention: Prevent Injury Related to Sensory Impairment  Recent Flowsheet Documentation  Taken 4/18/2021 1630 by Vashti Huggins, RN  Pressure Reduction Devices: pressure-redistributing mattress utilized  Skin Protection:   adhesive use limited   incontinence pads utilized   transparent dressing maintained   tubing/devices free from skin contact  Taken 4/18/2021 1221 by Vashti Huggins, RN  Pressure Reduction Devices: pressure-redistributing mattress utilized  Skin  Protection:   adhesive use limited   incontinence pads utilized   transparent dressing maintained   tubing/devices free from skin contact  Taken 4/18/2021 0845 by Vashti Huggins, RN  Pressure Reduction Devices: pressure-redistributing mattress utilized  Skin Protection:   adhesive use limited   incontinence pads utilized   transparent dressing maintained   tubing/devices free from skin contact

## 2021-04-18 NOTE — H&P
"      [facility] Medicine Services      Patient Name: Magdalena Gamboa  : 1960  MRN: [id number]  Primary Care Physician: Favian, No Known  Date of admission: 2021    Patient Care Team:  Provider, No Known as PCP - General          Subjective   History Present Illness     Chief Complaint:   Chief Complaint   Patient presents with   • Upper Extremity Issue                  60-year-old female presented with complaints of intermittent left arm numbness, left leg weakness and facial asymmetry since 2021.  She also reports frequent falls the past week but denies any injury.  She reports she awakened her partner at 3 AM and was having slurred speech.  She also reports she has a moderate frontal headache.  She denies any visual changes, syncope, chest pain or dizziness.  Upon exam she does have a left-sided facial asymmetry but is awake alert and answers appropriately.  She reports a past medical history of a stroke about 5 or 6 years ago with no residual disability.  She also reports she is allergic to contrast media and had a cardiac arrest as a result of receiving IV contrast dye.  Following diagnostics were performed:    CT head    \"1. Hyperdensity with surrounding edema in the right temporal lobe. The hyperdensity measures 1.0 x 1.3 cm. Possible considerations include a small hemorrhagic insult or hemorrhagic mass. A cavernous malformation is also a potential etiology. MR brain   with and without contrast can provide additional characterization.   2. Right frontal lobe encephalomalacia consistent with a remote insult.   \"    MRA of the head:    \"IMPRESSION:  Attenuation of the right M2 and more distal branches of the right MCA compatible with occlusion or high-grade stenosis related to recent right MCA infarct.    Attenuation of the distal right DESIREE near the region of infarct on recent MRI compatible with occlusion or high-grade stenosis.   Tiny 1-2 mm conical outpouching " "arising from the left internal carotid artery in the expected location of the posterior communicating artery. This commonly reflects the infundibular origin of the posterior communicating artery, although a tiny aneurysm   cannot be excluded by MRA.\"      MRA of the neck:    \"IMPRESSION:  Motion degraded examination.   Limited evaluation of the proximal internal carotid arteries due to motion where high-grade stenoss cannot be excluded. CTA recommended for better evaluation in this person with recent right anterior circulation infarct.   Elsewhere, evidence of moderate or severe stenosis, occlusion, or dissection of the visualized segments of the carotid or vertebral arteries in the neck.\"    MRI brain:    \"IMPRESSION:      1. Diffusion restriction involving the right paramedian frontal lobe (DESIREE territory), the right parietal postcentral gyrus (MCA territory), the right temporal lobe (MCA territory), and a small focus along the right occipital cortex (PCA territory)   favoring acute to early subacute infarcts. Given the distribution and pattern, an embolic phenomena is a concern. There is an approximately 1 cm diameter hemorrhage in the right temporal lobe infarct.   2. Mild-moderate white matter T2 prolongation favoring chronic microvascular ischemic changes, particularly within the right centrum semiovale.   3. Mild-moderate mastoid opacification the right, nonspecific.  4. Remote right frontal lobe infarct changes.    Chest x-ray:    \"IMPRESSION:  1.Mild cardiomegaly.  2.No radiographic findings of acute pulmonary abnormality.\"    Both Dr. Jordan of neurology and Dr. Amaral of neurosurgery were consulted from the emergency department.  [facility] was consulted and given timeframe of onset of symptoms and presentation, conservative management here was advised.    Labs show WBC of 12.5, she is afebrile.  Urinalysis pending.  She will be admitted for further evaluation and treatment with neurology " and neurosurgery following.      Review of Systems   Constitutional: Negative.   Eyes: Negative.    Cardiovascular: Negative.    Respiratory: Negative.    Endocrine: Negative.    Hematologic/Lymphatic: Negative.    Skin: Negative.    Musculoskeletal: Positive for falls and muscle weakness.   Gastrointestinal: Negative.    Genitourinary: Negative.    Neurological: Positive for focal weakness, headaches, loss of balance, numbness and paresthesias.   Psychiatric/Behavioral: Negative.    Allergic/Immunologic: Negative.            Personal History     Past Medical History:   Past Medical History:   Diagnosis Date   • AMI (acute myocardial infarction) (CMS/HCC)    • Chronic back pain    • Mood disorder (CMS/HCC)    • Stroke (CMS/HCC)    • Substance abuse (CMS/HCC)        Surgical History:      Past Surgical History:   Procedure Laterality Date   • CHOLECYSTECTOMY     • GALLBLADDER SURGERY     • HYSTERECTOMY     • JOINT REPLACEMENT     • THYROIDECTOMY, PARTIAL     • TONSILLECTOMY     • TUBAL ABDOMINAL LIGATION             Family History: family history includes Heart disease in her sister. Otherwise pertinent FHx was reviewed and unremarkable.     Social History:  reports that she has been smoking. She does not have any smokeless tobacco history on file.      Medications:  Prior to Admission medications    Not on File       Allergies:    Allergies   Allergen Reactions   • Aspirin Other (See Comments)   • Bee Venom Anaphylaxis   • Ciprofloxacin Headache   • Contrast Dye Anaphylaxis   • Levofloxacin Rash   • Meperidine Shortness Of Breath   • Promethazine Shortness Of Breath   • Pyridium [Phenazopyridine] Unknown - High Severity   • Vancomycin Anaphylaxis   • Azithromycin Rash       Objective   Objective     Vital Signs  Temp:  [98 °F (36.7 °C)-98.5 °F (36.9 °C)] 98.5 °F (36.9 °C)  Heart Rate:  [77-96] 77  Resp:  [14-15] 15  BP: (120-173)/() 142/63  SpO2:  [93 %-98 %] 97 %  on   ;   Device (Oxygen Therapy): room  air  Body mass index is 26 kg/m².    Physical Exam  Vitals reviewed.   Constitutional:       Appearance: Normal appearance. She is normal weight.   HENT:      Head: Normocephalic and atraumatic.      Right Ear: External ear normal.      Left Ear: External ear normal.      Nose: Nose normal.      Comments: Facial assymetry left      Mouth/Throat:      Mouth: Mucous membranes are moist.   Eyes:      Extraocular Movements: Extraocular movements intact.   Cardiovascular:      Rate and Rhythm: Normal rate and regular rhythm.      Pulses: Normal pulses.      Heart sounds: Normal heart sounds.   Pulmonary:      Effort: Pulmonary effort is normal.      Breath sounds: Normal breath sounds.   Abdominal:      Palpations: Abdomen is soft.   Genitourinary:     Comments: deferred  Musculoskeletal:         General: Normal range of motion.      Cervical back: Normal range of motion and neck supple.   Skin:     General: Skin is warm and dry.   Neurological:      Mental Status: She is alert and oriented to person, place, and time.      Comments: Reports left arm numbness and left leg weakness but can move left leg with good strength intact   Psychiatric:         Mood and Affect: Mood normal.         Behavior: Behavior normal.         Thought Content: Thought content normal.         Judgment: Judgment normal.           Results Review:  I have personally reviewed most recent radiology images and interpretations and agree with findings, most notably: MRI brain MRA head and neck CT head CXR.    Results from last 7 days   Lab Units 04/17/21 2031 04/17/21 2030   WBC 10*3/mm3  --  12.50*   HEMOGLOBIN g/dL  --  13.7   HEMATOCRIT %  --  40.7   PLATELETS 10*3/mm3  --  288   INR  0.97  --      Results from last 7 days   Lab Units 04/17/21 2031   SODIUM mmol/L 144   POTASSIUM mmol/L 4.2   CHLORIDE mmol/L 107   CO2 mmol/L 27.0   BUN mg/dL 6*   CREATININE mg/dL 0.75   GLUCOSE mg/dL 100*   CALCIUM mg/dL 9.2   ALT (SGPT) U/L 10   AST (SGOT) U/L  18     Estimated Creatinine Clearance: 90.3 mL/min (by C-G formula based on SCr of 0.75 mg/dL).  Brief Urine Lab Results     None          Microbiology Results (last 10 days)     ** No results found for the last 240 hours. **          ECG/EMG Results (most recent)     Procedure Component Value Units Date/Time    ECG 12 Lead [[id number]] Collected: 04/17/21 1919     Updated: 04/17/21 1920     QT Interval 382 ms     Narrative:      HEART RATE= 72  bpm  RR Interval= 832  ms  CT Interval= 140  ms  P Horizontal Axis= -1  deg  P Front Axis= 65  deg  QRSD Interval= 85  ms  QT Interval= 382  ms  QRS Axis= 26  deg  T Wave Axis= 52  deg  - NORMAL ECG -  Sinus rhythm  No previous ECG available for comparison  Electronically Signed By:   Date and Time of Study: 2021-04-17 19:19:06                  MRI Angiogram Head Without Contrast    Result Date: 4/17/2021  Attenuation of the right M2 and more distal branches of the right MCA compatible with occlusion or high-grade stenosis related to recent right MCA infarct. Attenuation of the distal right DESIREE near the region of infarct on recent MRI compatible with occlusion or high-grade stenosis. Tiny 1-2 mm conical outpouching arising from the left internal carotid artery in the expected location of the posterior communicating artery. This commonly reflects the infundibular origin of the posterior communicating artery, although a tiny aneurysm cannot be excluded by MRA. CTA offered for better evaluation as clinically warranted. Electronically signed by:  Chandan Hansen  4/17/2021 9:42 PM    MRI Angiogram Neck Without Contrast    Result Date: 4/18/2021  Motion degraded examination. Limited evaluation of the proximal internal carotid arteries due to motion where high-grade stenoses cannot be excluded. CTA recommended for better evaluation in this person with recent right anterior circulation infarct. Elsewhere, evidence of moderate or severe stenosis, occlusion, or dissection of the  visualized segments of the carotid or vertebral arteries in the neck. Electronically signed by:  Chandan Hansen  4/17/2021 10:33 PM    MRI Brain Without Contrast    Result Date: 4/17/2021  1. Diffusion restriction involving the right paramedian frontal lobe (DESIREE territory), the right parietal postcentral gyrus (MCA territory), the right temporal lobe (MCA territory), and a small focus along the right occipital cortex (PCA territory) favoring acute to early subacute infarcts. Given the distribution and pattern, an embolic phenomena is a concern. There is an approximately 1 cm diameter hemorrhage in the right temporal lobe infarct. 2. Mild-moderate white matter T2 prolongation favoring chronic microvascular ischemic changes, particularly within the right centrum semiovale. 3. Mild-moderate mastoid opacification the right, nonspecific. 4. Remote right frontal lobe infarct changes. These findings were discussed with practitioner Betty on 4/17/2021 7:05 PM ([place] time [place]). Electronically signed by:  Dennis Conway M.D.  4/17/2021 7:10 PM    XR Chest 1 View    Result Date: 4/17/2021  1.Mild cardiomegaly. 2.No radiographic findings of acute pulmonary abnormality.  Electronically Signed By-Yanick Sanchez MD On:4/17/2021 7:07 PM This report was finalized on [id number] by  Yanick Sanchez MD.    CT Head Without Contrast Stroke Protocol    Result Date: 4/17/2021  1. Hyperdensity with surrounding edema in the right temporal lobe. The hyperdensity measures 1.0 x 1.3 cm. Possible considerations include a small hemorrhagic insult or hemorrhagic mass. A cavernous malformation is also a potential etiology. MR brain with and without contrast can provide additional characterization. 2. Right frontal lobe encephalomalacia consistent with a remote insult. Electronically signed by:  Dennis Conway M.D.  4/17/2021 6:55 PM        Estimated Creatinine Clearance: 90.3 mL/min (by C-G formula based on SCr of 0.75  mg/dL).    Assessment/Plan   Assessment/Plan       Active Hospital Problems    Diagnosis  POA   • Facial asymmetry [Q67.0]  Not Applicable   • Cerebral infarct (CMS/HCC) [I63.9]  Yes      Resolved Hospital Problems   No resolved problems to display.     Facial asymmetry, left-sided weakness and numbness likely secondary to recent right MCA infarct per MRI/MRA, possible 1 cm diameter hemorrhage in the right temporal lobe infarct per radiology report, neurology and neurosurgery following, add statin    Leukocytosis, afebrile, chest x-ray shows no infiltrate, urinalysis pending, likely reactive    COPD/asthma?  With tobacco use, declined nicotine patch, encourage cessation, continue home Anoro Ellipta, [employer] formulary substitute here and albuterol    Anxiety depression on BuSpar    Chronic back pain on home gabapentin and acetaminophen codeine (inspect verified) not ordered due to neurochecks possible change in mental status            VTE Prophylaxis -   Mechanical Order History:      Ordered        04/18/21 0145  Place Sequential Compression Device  Once         04/18/21 0145  Maintain Sequential Compression Device  Continuous                 Pharmalogical Order History:     None          CODE STATUS:    Code Status and Medical Interventions:   Ordered at: 04/18/21 0145     Code Status:    CPR     Medical Interventions (Level of Support Prior to Arrest):    Full       This patient has been examined wearing appropriate Personal Protective Equipment     I discussed the patient's findings and my recommendations with patient.      Signature:Electronically signed by DIXIE Blakely, 04/18/21, 3:13 AM EDT.    [facility] Hospitalist Team      Attending attestation:    I performed a history and physical examination of the patient. I reviewed the nurse practitioner's note and agree with the documented findings and plan of care.    S:    Patient is a 60-year-old female with history of previous CVA presenting  for evaluation of multiple days of left-sided weakness. Patient found to have imaging consistent with signs of right-sided ischemic stroke with possible findings of underlying hemorrhage. Patient admitted for further evaluation neurology and neurosurgery consulted.    O:    Vital signs reviewed    General: Elderly female sitting up in bed breathing comfortably on room air no acute distress  HEENT: NC/AT, EOMI, mucosa moist, left facial droop noted  Heart: Regular, rate controlled  Chest: Normal work of breathing, moving air well no wheezing  Abdominal: Soft. NT/ND.  Musculoskeletal: Normal ROM.  No cyanosis. No calf tenderness.  Neurological: AAOx3, left-sided cranial nerve deficit, left arm weakness  Skin: Skin is warm and dry. No rash  Psychiatric: Tearful.    A/P    Left-sided weakness-secondary to underlying ischemic stroke though questionable atypical presentation with apparent signs of underlying hemorrhagic conversion. Thought to be possible embolic source  -Neurology consulted  -TTE ordered though neurology may request VARGHESE needing cardiology consult  -Reimage neck with MRI  -Hold anticoagulation at this time given hemorrhage  -Appreciate neurosurgery recommendations  -Blood pressure control  -A1c and lipids check B12  -Patient with anaphylaxis to CT contrast  -PT/OT/speech  -Statin  -Urine drug screen positive for benzodiazepine which patient had been given at this hospital stay    Right-sided hemorrhage-possible conversion from ischemia  -No anticoagulation at this time continue monitoring  -Repeat CT head likely within 24 hours  -Appreciate neurosurgery input    Leukocytosis-possible stress response  -Monitor for signs of infection  -Recheck daily    Obstructive lung disease-with history of tobacco abuse  -On room air currently  -Bronchodilators as needed    CVA-patient reports she had previous stroke with memory deficits no sensorimotor  -PT/OT  -Statin    Chronic pain/anxiety/depression-chronic in  nature  -Resume home medication as clinically appropriate

## 2021-04-19 ENCOUNTER — HOSPITAL ENCOUNTER (INPATIENT)
Facility: HOSPITAL | Age: 61
LOS: 4 days | Discharge: NURSING, REHAB OR LONG-TERM CARE | End: 2021-04-23
Attending: EMERGENCY MEDICINE | Admitting: INTERNAL MEDICINE

## 2021-04-19 ENCOUNTER — APPOINTMENT (OUTPATIENT)
Dept: MRI IMAGING | Facility: HOSPITAL | Age: 61
End: 2021-04-19

## 2021-04-19 ENCOUNTER — APPOINTMENT (OUTPATIENT)
Dept: CT IMAGING | Facility: HOSPITAL | Age: 61
End: 2021-04-19

## 2021-04-19 DIAGNOSIS — I63.9 CEREBROVASCULAR ACCIDENT (CVA), UNSPECIFIED MECHANISM (HCC): Primary | ICD-10-CM

## 2021-04-19 LAB
ANION GAP SERPL CALCULATED.3IONS-SCNC: 12 MMOL/L (ref 5–15)
BASOPHILS # BLD AUTO: 0 10*3/MM3 (ref 0–0.2)
BASOPHILS NFR BLD AUTO: 0.5 % (ref 0–1.5)
BUN SERPL-MCNC: 7 MG/DL (ref 8–23)
BUN/CREAT SERPL: 8.9 (ref 7–25)
CALCIUM SPEC-SCNC: 9 MG/DL (ref 8.6–10.5)
CHLORIDE SERPL-SCNC: 105 MMOL/L (ref 98–107)
CO2 SERPL-SCNC: 26 MMOL/L (ref 22–29)
CREAT SERPL-MCNC: 0.79 MG/DL (ref 0.57–1)
DEPRECATED RDW RBC AUTO: 52.1 FL (ref 37–54)
EOSINOPHIL # BLD AUTO: 0.2 10*3/MM3 (ref 0–0.4)
EOSINOPHIL NFR BLD AUTO: 1.5 % (ref 0.3–6.2)
ERYTHROCYTE [DISTWIDTH] IN BLOOD BY AUTOMATED COUNT: 16.5 % (ref 12.3–15.4)
GFR SERPL CREATININE-BSD FRML MDRD: 74 ML/MIN/1.73
GLUCOSE BLDC GLUCOMTR-MCNC: 71 MG/DL (ref 70–105)
GLUCOSE SERPL-MCNC: 99 MG/DL (ref 65–99)
HBA1C MFR BLD: 5.9 % (ref 3.5–5.6)
HCT VFR BLD AUTO: 39.9 % (ref 34–46.6)
HGB BLD-MCNC: 13.5 G/DL (ref 12–15.9)
HOLD SPECIMEN: NORMAL
LYMPHOCYTES # BLD AUTO: 2.5 10*3/MM3 (ref 0.7–3.1)
LYMPHOCYTES NFR BLD AUTO: 24.1 % (ref 19.6–45.3)
MCH RBC QN AUTO: 30.4 PG (ref 26.6–33)
MCHC RBC AUTO-ENTMCNC: 33.9 G/DL (ref 31.5–35.7)
MCV RBC AUTO: 89.5 FL (ref 79–97)
MONOCYTES # BLD AUTO: 0.5 10*3/MM3 (ref 0.1–0.9)
MONOCYTES NFR BLD AUTO: 5 % (ref 5–12)
NEUTROPHILS NFR BLD AUTO: 68.9 % (ref 42.7–76)
NEUTROPHILS NFR BLD AUTO: 7.2 10*3/MM3 (ref 1.7–7)
NRBC BLD AUTO-RTO: 0 /100 WBC (ref 0–0.2)
PLATELET # BLD AUTO: 287 10*3/MM3 (ref 140–450)
PMV BLD AUTO: 9 FL (ref 6–12)
POTASSIUM SERPL-SCNC: 3.3 MMOL/L (ref 3.5–5.2)
POTASSIUM SERPL-SCNC: 3.5 MMOL/L (ref 3.5–5.2)
RBC # BLD AUTO: 4.45 10*6/MM3 (ref 3.77–5.28)
SODIUM SERPL-SCNC: 143 MMOL/L (ref 136–145)
WBC # BLD AUTO: 10.5 10*3/MM3 (ref 3.4–10.8)
WHOLE BLOOD HOLD SPECIMEN: NORMAL

## 2021-04-19 PROCEDURE — 70450 CT HEAD/BRAIN W/O DYE: CPT

## 2021-04-19 PROCEDURE — G0378 HOSPITAL OBSERVATION PER HR: HCPCS

## 2021-04-19 PROCEDURE — 85025 COMPLETE CBC W/AUTO DIFF WBC: CPT | Performed by: EMERGENCY MEDICINE

## 2021-04-19 PROCEDURE — 82962 GLUCOSE BLOOD TEST: CPT

## 2021-04-19 PROCEDURE — 99223 1ST HOSP IP/OBS HIGH 75: CPT | Performed by: NURSE PRACTITIONER

## 2021-04-19 PROCEDURE — 99284 EMERGENCY DEPT VISIT MOD MDM: CPT

## 2021-04-19 PROCEDURE — 84132 ASSAY OF SERUM POTASSIUM: CPT | Performed by: INTERNAL MEDICINE

## 2021-04-19 PROCEDURE — 70551 MRI BRAIN STEM W/O DYE: CPT

## 2021-04-19 PROCEDURE — 80048 BASIC METABOLIC PNL TOTAL CA: CPT | Performed by: EMERGENCY MEDICINE

## 2021-04-19 RX ORDER — BUTALBITAL, ACETAMINOPHEN AND CAFFEINE 50; 325; 40 MG/1; MG/1; MG/1
1 TABLET ORAL EVERY 6 HOURS PRN
Status: DISCONTINUED | OUTPATIENT
Start: 2021-04-19 | End: 2021-04-20

## 2021-04-19 RX ORDER — MAGNESIUM SULFATE 1 G/100ML
1 INJECTION INTRAVENOUS AS NEEDED
Status: DISCONTINUED | OUTPATIENT
Start: 2021-04-19 | End: 2021-04-23 | Stop reason: HOSPADM

## 2021-04-19 RX ORDER — POTASSIUM CHLORIDE 7.45 MG/ML
10 INJECTION INTRAVENOUS
Status: DISCONTINUED | OUTPATIENT
Start: 2021-04-19 | End: 2021-04-23 | Stop reason: HOSPADM

## 2021-04-19 RX ORDER — BUSPIRONE HYDROCHLORIDE 10 MG/1
10 TABLET ORAL 3 TIMES DAILY
Status: DISCONTINUED | OUTPATIENT
Start: 2021-04-19 | End: 2021-04-23 | Stop reason: HOSPADM

## 2021-04-19 RX ORDER — ACETAMINOPHEN 325 MG/1
650 TABLET ORAL EVERY 4 HOURS PRN
Status: DISCONTINUED | OUTPATIENT
Start: 2021-04-19 | End: 2021-04-23 | Stop reason: HOSPADM

## 2021-04-19 RX ORDER — SODIUM CHLORIDE 0.9 % (FLUSH) 0.9 %
10 SYRINGE (ML) INJECTION AS NEEDED
Status: DISCONTINUED | OUTPATIENT
Start: 2021-04-19 | End: 2021-04-23 | Stop reason: HOSPADM

## 2021-04-19 RX ORDER — ACETAMINOPHEN 325 MG/1
650 TABLET ORAL ONCE
Status: COMPLETED | OUTPATIENT
Start: 2021-04-19 | End: 2021-04-19

## 2021-04-19 RX ORDER — ACETAMINOPHEN 650 MG/1
650 SUPPOSITORY RECTAL EVERY 4 HOURS PRN
Status: DISCONTINUED | OUTPATIENT
Start: 2021-04-19 | End: 2021-04-23 | Stop reason: HOSPADM

## 2021-04-19 RX ORDER — SODIUM CHLORIDE 0.9 % (FLUSH) 0.9 %
3-10 SYRINGE (ML) INJECTION AS NEEDED
Status: DISCONTINUED | OUTPATIENT
Start: 2021-04-19 | End: 2021-04-23 | Stop reason: HOSPADM

## 2021-04-19 RX ORDER — SODIUM CHLORIDE 0.9 % (FLUSH) 0.9 %
3 SYRINGE (ML) INJECTION EVERY 12 HOURS SCHEDULED
Status: DISCONTINUED | OUTPATIENT
Start: 2021-04-19 | End: 2021-04-23 | Stop reason: HOSPADM

## 2021-04-19 RX ORDER — IPRATROPIUM BROMIDE AND ALBUTEROL SULFATE 2.5; .5 MG/3ML; MG/3ML
3 SOLUTION RESPIRATORY (INHALATION) EVERY 4 HOURS PRN
Status: DISCONTINUED | OUTPATIENT
Start: 2021-04-19 | End: 2021-04-23 | Stop reason: HOSPADM

## 2021-04-19 RX ORDER — ATORVASTATIN CALCIUM 40 MG/1
80 TABLET, FILM COATED ORAL NIGHTLY
Status: DISCONTINUED | OUTPATIENT
Start: 2021-04-19 | End: 2021-04-21

## 2021-04-19 RX ORDER — BISACODYL 10 MG
10 SUPPOSITORY, RECTAL RECTAL DAILY PRN
Status: DISCONTINUED | OUTPATIENT
Start: 2021-04-19 | End: 2021-04-23 | Stop reason: HOSPADM

## 2021-04-19 RX ORDER — MAGNESIUM SULFATE HEPTAHYDRATE 40 MG/ML
2 INJECTION, SOLUTION INTRAVENOUS AS NEEDED
Status: DISCONTINUED | OUTPATIENT
Start: 2021-04-19 | End: 2021-04-23 | Stop reason: HOSPADM

## 2021-04-19 RX ORDER — POTASSIUM CHLORIDE 20 MEQ/1
40 TABLET, EXTENDED RELEASE ORAL AS NEEDED
Status: DISCONTINUED | OUTPATIENT
Start: 2021-04-19 | End: 2021-04-23 | Stop reason: HOSPADM

## 2021-04-19 RX ORDER — NICOTINE 21 MG/24HR
1 PATCH, TRANSDERMAL 24 HOURS TRANSDERMAL
Status: DISCONTINUED | OUTPATIENT
Start: 2021-04-19 | End: 2021-04-23 | Stop reason: HOSPADM

## 2021-04-19 RX ORDER — ONDANSETRON 2 MG/ML
4 INJECTION INTRAMUSCULAR; INTRAVENOUS EVERY 6 HOURS PRN
Status: DISCONTINUED | OUTPATIENT
Start: 2021-04-19 | End: 2021-04-23 | Stop reason: HOSPADM

## 2021-04-19 RX ORDER — POTASSIUM CHLORIDE 1.5 G/1.77G
40 POWDER, FOR SOLUTION ORAL AS NEEDED
Status: DISCONTINUED | OUTPATIENT
Start: 2021-04-19 | End: 2021-04-23 | Stop reason: HOSPADM

## 2021-04-19 RX ADMIN — SODIUM CHLORIDE 10 MG/HR: 9 INJECTION, SOLUTION INTRAVENOUS at 23:46

## 2021-04-19 RX ADMIN — ACETAMINOPHEN 650 MG: 650 SUPPOSITORY RECTAL at 20:12

## 2021-04-19 RX ADMIN — SODIUM CHLORIDE 10 MG/HR: 9 INJECTION, SOLUTION INTRAVENOUS at 16:01

## 2021-04-19 RX ADMIN — SODIUM CHLORIDE 10 MG/HR: 9 INJECTION, SOLUTION INTRAVENOUS at 20:33

## 2021-04-19 RX ADMIN — Medication 3 ML: at 20:12

## 2021-04-19 RX ADMIN — ACETAMINOPHEN 650 MG: 325 TABLET, FILM COATED ORAL at 11:37

## 2021-04-19 NOTE — CASE MANAGEMENT/SOCIAL WORK
Case Management Discharge Note                Selected Continued Care - Discharged on 4/18/2021 Admission date: 4/17/2021 - Discharge disposition: Left Against Medical Advice                 Final Discharge Disposition Code: 07 - left AMA

## 2021-04-20 ENCOUNTER — APPOINTMENT (OUTPATIENT)
Dept: CT IMAGING | Facility: HOSPITAL | Age: 61
End: 2021-04-20

## 2021-04-20 LAB
BACTERIA SPEC AEROBE CULT: ABNORMAL
CHOLEST SERPL-MCNC: 109 MG/DL (ref 0–200)
GLUCOSE BLDC GLUCOMTR-MCNC: 89 MG/DL (ref 70–105)
HBA1C MFR BLD: 5.7 % (ref 3.5–5.6)
HDLC SERPL-MCNC: 51 MG/DL (ref 40–60)
LDLC SERPL CALC-MCNC: 42 MG/DL (ref 0–100)
LDLC/HDLC SERPL: 0.83 {RATIO}
MAGNESIUM SERPL-MCNC: 1.7 MG/DL (ref 1.6–2.4)
POTASSIUM SERPL-SCNC: 4.2 MMOL/L (ref 3.5–5.2)
TRIGL SERPL-MCNC: 79 MG/DL (ref 0–150)
TSH SERPL DL<=0.05 MIU/L-ACNC: 1.08 UIU/ML (ref 0.27–4.2)
VIT B12 BLD-MCNC: 290 PG/ML (ref 211–946)
VLDLC SERPL-MCNC: 16 MG/DL (ref 5–40)

## 2021-04-20 PROCEDURE — 83036 HEMOGLOBIN GLYCOSYLATED A1C: CPT | Performed by: INTERNAL MEDICINE

## 2021-04-20 PROCEDURE — 84443 ASSAY THYROID STIM HORMONE: CPT | Performed by: INTERNAL MEDICINE

## 2021-04-20 PROCEDURE — 70450 CT HEAD/BRAIN W/O DYE: CPT

## 2021-04-20 PROCEDURE — 82962 GLUCOSE BLOOD TEST: CPT

## 2021-04-20 PROCEDURE — 97162 PT EVAL MOD COMPLEX 30 MIN: CPT

## 2021-04-20 PROCEDURE — 92610 EVALUATE SWALLOWING FUNCTION: CPT

## 2021-04-20 PROCEDURE — 99232 SBSQ HOSP IP/OBS MODERATE 35: CPT | Performed by: INTERNAL MEDICINE

## 2021-04-20 PROCEDURE — 84132 ASSAY OF SERUM POTASSIUM: CPT | Performed by: NURSE PRACTITIONER

## 2021-04-20 PROCEDURE — 25010000002 CYANOCOBALAMIN PER 1000 MCG: Performed by: NURSE PRACTITIONER

## 2021-04-20 PROCEDURE — 82607 VITAMIN B-12: CPT | Performed by: INTERNAL MEDICINE

## 2021-04-20 PROCEDURE — 97166 OT EVAL MOD COMPLEX 45 MIN: CPT

## 2021-04-20 PROCEDURE — 83735 ASSAY OF MAGNESIUM: CPT | Performed by: NURSE PRACTITIONER

## 2021-04-20 PROCEDURE — 99232 SBSQ HOSP IP/OBS MODERATE 35: CPT | Performed by: NURSE PRACTITIONER

## 2021-04-20 PROCEDURE — 25010000003 POTASSIUM CHLORIDE 10 MEQ/100ML SOLUTION: Performed by: NURSE PRACTITIONER

## 2021-04-20 PROCEDURE — 97530 THERAPEUTIC ACTIVITIES: CPT

## 2021-04-20 PROCEDURE — 80061 LIPID PANEL: CPT | Performed by: INTERNAL MEDICINE

## 2021-04-20 PROCEDURE — 92523 SPEECH SOUND LANG COMPREHEN: CPT

## 2021-04-20 RX ORDER — CEFDINIR 300 MG/1
300 CAPSULE ORAL EVERY 12 HOURS SCHEDULED
Status: DISCONTINUED | OUTPATIENT
Start: 2021-04-20 | End: 2021-04-23 | Stop reason: HOSPADM

## 2021-04-20 RX ORDER — BUTALBITAL, ACETAMINOPHEN AND CAFFEINE 50; 325; 40 MG/1; MG/1; MG/1
2 TABLET ORAL EVERY 6 HOURS PRN
Status: DISCONTINUED | OUTPATIENT
Start: 2021-04-20 | End: 2021-04-22

## 2021-04-20 RX ORDER — CLOPIDOGREL BISULFATE 75 MG/1
75 TABLET ORAL DAILY
Status: DISCONTINUED | OUTPATIENT
Start: 2021-04-21 | End: 2021-04-23 | Stop reason: HOSPADM

## 2021-04-20 RX ORDER — HYDROXYZINE HYDROCHLORIDE 25 MG/1
25 TABLET, FILM COATED ORAL 3 TIMES DAILY PRN
Status: DISCONTINUED | OUTPATIENT
Start: 2021-04-20 | End: 2021-04-23 | Stop reason: HOSPADM

## 2021-04-20 RX ORDER — BUTALBITAL, ACETAMINOPHEN AND CAFFEINE 50; 325; 40 MG/1; MG/1; MG/1
1 TABLET ORAL ONCE
Status: COMPLETED | OUTPATIENT
Start: 2021-04-20 | End: 2021-04-20

## 2021-04-20 RX ORDER — CYANOCOBALAMIN 1000 UG/ML
1000 INJECTION, SOLUTION INTRAMUSCULAR; SUBCUTANEOUS
Status: DISCONTINUED | OUTPATIENT
Start: 2021-04-20 | End: 2021-04-23 | Stop reason: HOSPADM

## 2021-04-20 RX ORDER — BUTALBITAL, ACETAMINOPHEN AND CAFFEINE 50; 325; 40 MG/1; MG/1; MG/1
1 TABLET ORAL EVERY 6 HOURS PRN
Status: DISCONTINUED | OUTPATIENT
Start: 2021-04-20 | End: 2021-04-20 | Stop reason: SDUPTHER

## 2021-04-20 RX ADMIN — SODIUM CHLORIDE 5 MG/HR: 9 INJECTION, SOLUTION INTRAVENOUS at 02:42

## 2021-04-20 RX ADMIN — CEFDINIR 300 MG: 300 CAPSULE ORAL at 21:05

## 2021-04-20 RX ADMIN — BUTALBITAL, ACETAMINOPHEN AND CAFFEINE 1 TABLET: 50; 325; 40 TABLET ORAL at 11:55

## 2021-04-20 RX ADMIN — POTASSIUM CHLORIDE 10 MEQ: 7.46 INJECTION, SOLUTION INTRAVENOUS at 04:04

## 2021-04-20 RX ADMIN — HYDROXYZINE HYDROCHLORIDE 25 MG: 25 TABLET, FILM COATED ORAL at 15:30

## 2021-04-20 RX ADMIN — ACETAMINOPHEN 650 MG: 325 TABLET, FILM COATED ORAL at 23:04

## 2021-04-20 RX ADMIN — BUSPIRONE HYDROCHLORIDE 10 MG: 10 TABLET ORAL at 15:31

## 2021-04-20 RX ADMIN — BUTALBITAL, ACETAMINOPHEN AND CAFFEINE 2 TABLET: 50; 325; 40 TABLET ORAL at 15:30

## 2021-04-20 RX ADMIN — BUTALBITAL, ACETAMINOPHEN AND CAFFEINE 1 TABLET: 50; 325; 40 TABLET ORAL at 09:22

## 2021-04-20 RX ADMIN — BUSPIRONE HYDROCHLORIDE 10 MG: 10 TABLET ORAL at 21:05

## 2021-04-20 RX ADMIN — CYANOCOBALAMIN 1000 MCG: 1000 INJECTION, SOLUTION INTRAMUSCULAR; SUBCUTANEOUS at 11:54

## 2021-04-20 RX ADMIN — HYDROXYZINE HYDROCHLORIDE 25 MG: 25 TABLET, FILM COATED ORAL at 11:55

## 2021-04-20 RX ADMIN — Medication 1 PATCH: at 04:00

## 2021-04-20 RX ADMIN — ACETAMINOPHEN 650 MG: 650 SUPPOSITORY RECTAL at 03:06

## 2021-04-20 RX ADMIN — Medication 1 PATCH: at 09:22

## 2021-04-20 RX ADMIN — POTASSIUM CHLORIDE 10 MEQ: 7.46 INJECTION, SOLUTION INTRAVENOUS at 02:40

## 2021-04-20 RX ADMIN — BUSPIRONE HYDROCHLORIDE 10 MG: 10 TABLET ORAL at 09:22

## 2021-04-20 RX ADMIN — ATORVASTATIN CALCIUM 80 MG: 40 TABLET, FILM COATED ORAL at 21:05

## 2021-04-20 RX ADMIN — ACETAMINOPHEN 650 MG: 325 TABLET, FILM COATED ORAL at 18:13

## 2021-04-20 RX ADMIN — POTASSIUM CHLORIDE 10 MEQ: 7.46 INJECTION, SOLUTION INTRAVENOUS at 05:02

## 2021-04-20 RX ADMIN — BUTALBITAL, ACETAMINOPHEN AND CAFFEINE 2 TABLET: 50; 325; 40 TABLET ORAL at 21:04

## 2021-04-20 RX ADMIN — POTASSIUM CHLORIDE 10 MEQ: 7.46 INJECTION, SOLUTION INTRAVENOUS at 01:31

## 2021-04-20 RX ADMIN — Medication 3 ML: at 21:05

## 2021-04-21 LAB
CRP SERPL-MCNC: 1.43 MG/DL (ref 0–0.5)
ERYTHROCYTE [SEDIMENTATION RATE] IN BLOOD: 27 MM/HR (ref 0–30)
POTASSIUM SERPL-SCNC: 4.1 MMOL/L (ref 3.5–5.2)

## 2021-04-21 PROCEDURE — 25010000002 ONDANSETRON PER 1 MG: Performed by: NURSE PRACTITIONER

## 2021-04-21 PROCEDURE — 86038 ANTINUCLEAR ANTIBODIES: CPT | Performed by: NURSE PRACTITIONER

## 2021-04-21 PROCEDURE — 85652 RBC SED RATE AUTOMATED: CPT | Performed by: NURSE PRACTITIONER

## 2021-04-21 PROCEDURE — 99233 SBSQ HOSP IP/OBS HIGH 50: CPT | Performed by: NURSE PRACTITIONER

## 2021-04-21 PROCEDURE — 97112 NEUROMUSCULAR REEDUCATION: CPT

## 2021-04-21 PROCEDURE — U0005 INFEC AGEN DETEC AMPLI PROBE: HCPCS | Performed by: INTERNAL MEDICINE

## 2021-04-21 PROCEDURE — 97530 THERAPEUTIC ACTIVITIES: CPT

## 2021-04-21 PROCEDURE — 97535 SELF CARE MNGMENT TRAINING: CPT

## 2021-04-21 PROCEDURE — U0004 COV-19 TEST NON-CDC HGH THRU: HCPCS | Performed by: INTERNAL MEDICINE

## 2021-04-21 PROCEDURE — 86256 FLUORESCENT ANTIBODY TITER: CPT | Performed by: NURSE PRACTITIONER

## 2021-04-21 PROCEDURE — 84132 ASSAY OF SERUM POTASSIUM: CPT | Performed by: NURSE PRACTITIONER

## 2021-04-21 PROCEDURE — 92526 ORAL FUNCTION THERAPY: CPT

## 2021-04-21 PROCEDURE — 83520 IMMUNOASSAY QUANT NOS NONAB: CPT | Performed by: NURSE PRACTITIONER

## 2021-04-21 PROCEDURE — 99223 1ST HOSP IP/OBS HIGH 75: CPT | Performed by: INTERNAL MEDICINE

## 2021-04-21 PROCEDURE — 99232 SBSQ HOSP IP/OBS MODERATE 35: CPT | Performed by: INTERNAL MEDICINE

## 2021-04-21 PROCEDURE — 92507 TX SP LANG VOICE COMM INDIV: CPT

## 2021-04-21 PROCEDURE — 86140 C-REACTIVE PROTEIN: CPT | Performed by: NURSE PRACTITIONER

## 2021-04-21 PROCEDURE — 25010000002 KETOROLAC TROMETHAMINE PER 15 MG: Performed by: NURSE PRACTITIONER

## 2021-04-21 PROCEDURE — 93010 ELECTROCARDIOGRAM REPORT: CPT | Performed by: INTERNAL MEDICINE

## 2021-04-21 PROCEDURE — 93005 ELECTROCARDIOGRAM TRACING: CPT | Performed by: INTERNAL MEDICINE

## 2021-04-21 RX ORDER — KETOROLAC TROMETHAMINE 30 MG/ML
30 INJECTION, SOLUTION INTRAMUSCULAR; INTRAVENOUS ONCE
Status: COMPLETED | OUTPATIENT
Start: 2021-04-21 | End: 2021-04-21

## 2021-04-21 RX ORDER — ROSUVASTATIN CALCIUM 10 MG/1
40 TABLET, COATED ORAL NIGHTLY
Status: DISCONTINUED | OUTPATIENT
Start: 2021-04-21 | End: 2021-04-23 | Stop reason: HOSPADM

## 2021-04-21 RX ADMIN — Medication 1 PATCH: at 08:50

## 2021-04-21 RX ADMIN — Medication 3 ML: at 20:32

## 2021-04-21 RX ADMIN — BUTALBITAL, ACETAMINOPHEN AND CAFFEINE 2 TABLET: 50; 325; 40 TABLET ORAL at 01:20

## 2021-04-21 RX ADMIN — ONDANSETRON 4 MG: 2 INJECTION INTRAMUSCULAR; INTRAVENOUS at 18:36

## 2021-04-21 RX ADMIN — HYDROXYZINE HYDROCHLORIDE 25 MG: 25 TABLET, FILM COATED ORAL at 02:14

## 2021-04-21 RX ADMIN — CEFDINIR 300 MG: 300 CAPSULE ORAL at 08:50

## 2021-04-21 RX ADMIN — BUSPIRONE HYDROCHLORIDE 10 MG: 10 TABLET ORAL at 20:30

## 2021-04-21 RX ADMIN — KETOROLAC TROMETHAMINE 30 MG: 30 INJECTION, SOLUTION INTRAMUSCULAR at 13:26

## 2021-04-21 RX ADMIN — Medication 3 ML: at 08:49

## 2021-04-21 RX ADMIN — HYDROXYZINE HYDROCHLORIDE 25 MG: 25 TABLET, FILM COATED ORAL at 17:09

## 2021-04-21 RX ADMIN — BUTALBITAL, ACETAMINOPHEN AND CAFFEINE 2 TABLET: 50; 325; 40 TABLET ORAL at 08:49

## 2021-04-21 RX ADMIN — BUSPIRONE HYDROCHLORIDE 10 MG: 10 TABLET ORAL at 08:50

## 2021-04-21 RX ADMIN — CLOPIDOGREL BISULFATE 75 MG: 75 TABLET ORAL at 08:50

## 2021-04-21 RX ADMIN — VERAPAMIL HYDROCHLORIDE 120 MG: 120 TABLET, FILM COATED, EXTENDED RELEASE ORAL at 20:30

## 2021-04-21 RX ADMIN — BUTALBITAL, ACETAMINOPHEN AND CAFFEINE 2 TABLET: 50; 325; 40 TABLET ORAL at 20:43

## 2021-04-21 RX ADMIN — BUSPIRONE HYDROCHLORIDE 10 MG: 10 TABLET ORAL at 17:08

## 2021-04-21 RX ADMIN — CEFDINIR 300 MG: 300 CAPSULE ORAL at 20:30

## 2021-04-21 RX ADMIN — ROSUVASTATIN CALCIUM 40 MG: 10 TABLET, FILM COATED ORAL at 20:30

## 2021-04-22 ENCOUNTER — APPOINTMENT (OUTPATIENT)
Dept: CARDIOLOGY | Facility: HOSPITAL | Age: 61
End: 2021-04-22

## 2021-04-22 ENCOUNTER — ANESTHESIA EVENT (OUTPATIENT)
Dept: CARDIOLOGY | Facility: HOSPITAL | Age: 61
End: 2021-04-22

## 2021-04-22 ENCOUNTER — ANESTHESIA (OUTPATIENT)
Dept: CARDIOLOGY | Facility: HOSPITAL | Age: 61
End: 2021-04-22

## 2021-04-22 VITALS — SYSTOLIC BLOOD PRESSURE: 111 MMHG | OXYGEN SATURATION: 100 % | DIASTOLIC BLOOD PRESSURE: 75 MMHG

## 2021-04-22 LAB
ANION GAP SERPL CALCULATED.3IONS-SCNC: 7 MMOL/L (ref 5–15)
BH CV ECHO MEAS - RAP SYSTOLE: 10 MMHG
BH CV ECHO MEAS - RVSP: 34 MMHG
BH CV ECHO MEAS - TR MAX VEL: 244.8 CM/SEC
BUN SERPL-MCNC: 14 MG/DL (ref 8–23)
BUN/CREAT SERPL: 13.7 (ref 7–25)
CALCIUM SPEC-SCNC: 8.5 MG/DL (ref 8.6–10.5)
CHLORIDE SERPL-SCNC: 106 MMOL/L (ref 98–107)
CO2 SERPL-SCNC: 26 MMOL/L (ref 22–29)
CREAT SERPL-MCNC: 1.02 MG/DL (ref 0.57–1)
DEPRECATED RDW RBC AUTO: 53.8 FL (ref 37–54)
ERYTHROCYTE [DISTWIDTH] IN BLOOD BY AUTOMATED COUNT: 17.1 % (ref 12.3–15.4)
GFR SERPL CREATININE-BSD FRML MDRD: 55 ML/MIN/1.73
GLUCOSE SERPL-MCNC: 99 MG/DL (ref 65–99)
HCT VFR BLD AUTO: 34.6 % (ref 34–46.6)
HGB BLD-MCNC: 11.7 G/DL (ref 12–15.9)
MCH RBC QN AUTO: 30.5 PG (ref 26.6–33)
MCHC RBC AUTO-ENTMCNC: 33.8 G/DL (ref 31.5–35.7)
MCV RBC AUTO: 90.3 FL (ref 79–97)
PLATELET # BLD AUTO: 278 10*3/MM3 (ref 140–450)
PMV BLD AUTO: 8.8 FL (ref 6–12)
POTASSIUM SERPL-SCNC: 4.1 MMOL/L (ref 3.5–5.2)
RBC # BLD AUTO: 3.83 10*6/MM3 (ref 3.77–5.28)
SARS-COV-2 ORF1AB RESP QL NAA+PROBE: NOT DETECTED
SODIUM SERPL-SCNC: 139 MMOL/L (ref 136–145)
WBC # BLD AUTO: 8.9 10*3/MM3 (ref 3.4–10.8)

## 2021-04-22 PROCEDURE — 80048 BASIC METABOLIC PNL TOTAL CA: CPT | Performed by: INTERNAL MEDICINE

## 2021-04-22 PROCEDURE — 85027 COMPLETE CBC AUTOMATED: CPT | Performed by: INTERNAL MEDICINE

## 2021-04-22 PROCEDURE — 99232 SBSQ HOSP IP/OBS MODERATE 35: CPT | Performed by: INTERNAL MEDICINE

## 2021-04-22 PROCEDURE — 93320 DOPPLER ECHO COMPLETE: CPT

## 2021-04-22 PROCEDURE — 25010000002 PROPOFOL 10 MG/ML EMULSION: Performed by: ANESTHESIOLOGY

## 2021-04-22 PROCEDURE — 97112 NEUROMUSCULAR REEDUCATION: CPT

## 2021-04-22 PROCEDURE — 99233 SBSQ HOSP IP/OBS HIGH 50: CPT | Performed by: NURSE PRACTITIONER

## 2021-04-22 PROCEDURE — 93325 DOPPLER ECHO COLOR FLOW MAPG: CPT | Performed by: INTERNAL MEDICINE

## 2021-04-22 PROCEDURE — 25010000002 ONDANSETRON PER 1 MG: Performed by: NURSE PRACTITIONER

## 2021-04-22 PROCEDURE — 99233 SBSQ HOSP IP/OBS HIGH 50: CPT | Performed by: INTERNAL MEDICINE

## 2021-04-22 PROCEDURE — 93320 DOPPLER ECHO COMPLETE: CPT | Performed by: INTERNAL MEDICINE

## 2021-04-22 PROCEDURE — 92526 ORAL FUNCTION THERAPY: CPT

## 2021-04-22 PROCEDURE — 93325 DOPPLER ECHO COLOR FLOW MAPG: CPT

## 2021-04-22 PROCEDURE — 93312 ECHO TRANSESOPHAGEAL: CPT

## 2021-04-22 PROCEDURE — 92507 TX SP LANG VOICE COMM INDIV: CPT

## 2021-04-22 PROCEDURE — 93312 ECHO TRANSESOPHAGEAL: CPT | Performed by: INTERNAL MEDICINE

## 2021-04-22 RX ORDER — SODIUM CHLORIDE 9 MG/ML
INJECTION, SOLUTION INTRAVENOUS CONTINUOUS PRN
Status: DISCONTINUED | OUTPATIENT
Start: 2021-04-22 | End: 2021-04-22 | Stop reason: SURG

## 2021-04-22 RX ORDER — PROPOFOL 10 MG/ML
VIAL (ML) INTRAVENOUS AS NEEDED
Status: DISCONTINUED | OUTPATIENT
Start: 2021-04-22 | End: 2021-04-22 | Stop reason: SURG

## 2021-04-22 RX ORDER — ACETAMINOPHEN AND CODEINE PHOSPHATE 300; 30 MG/1; MG/1
1 TABLET ORAL EVERY 8 HOURS PRN
Status: DISCONTINUED | OUTPATIENT
Start: 2021-04-22 | End: 2021-04-23 | Stop reason: HOSPADM

## 2021-04-22 RX ORDER — GABAPENTIN 300 MG/1
300 CAPSULE ORAL 3 TIMES DAILY
Status: DISCONTINUED | OUTPATIENT
Start: 2021-04-22 | End: 2021-04-23 | Stop reason: HOSPADM

## 2021-04-22 RX ADMIN — SODIUM CHLORIDE: 0.9 INJECTION, SOLUTION INTRAVENOUS at 07:48

## 2021-04-22 RX ADMIN — Medication 3 ML: at 21:56

## 2021-04-22 RX ADMIN — CEFDINIR 300 MG: 300 CAPSULE ORAL at 21:56

## 2021-04-22 RX ADMIN — GABAPENTIN 300 MG: 300 CAPSULE ORAL at 21:56

## 2021-04-22 RX ADMIN — BUSPIRONE HYDROCHLORIDE 10 MG: 10 TABLET ORAL at 15:00

## 2021-04-22 RX ADMIN — BUSPIRONE HYDROCHLORIDE 10 MG: 10 TABLET ORAL at 09:17

## 2021-04-22 RX ADMIN — Medication 3 ML: at 09:18

## 2021-04-22 RX ADMIN — CLOPIDOGREL BISULFATE 75 MG: 75 TABLET ORAL at 09:17

## 2021-04-22 RX ADMIN — VERAPAMIL HYDROCHLORIDE 120 MG: 120 TABLET, FILM COATED, EXTENDED RELEASE ORAL at 21:56

## 2021-04-22 RX ADMIN — CEFDINIR 300 MG: 300 CAPSULE ORAL at 09:17

## 2021-04-22 RX ADMIN — HYDROXYZINE HYDROCHLORIDE 25 MG: 25 TABLET, FILM COATED ORAL at 13:58

## 2021-04-22 RX ADMIN — ACETAMINOPHEN 650 MG: 325 TABLET, FILM COATED ORAL at 10:47

## 2021-04-22 RX ADMIN — ONDANSETRON 4 MG: 2 INJECTION INTRAMUSCULAR; INTRAVENOUS at 01:29

## 2021-04-22 RX ADMIN — BUSPIRONE HYDROCHLORIDE 10 MG: 10 TABLET ORAL at 21:56

## 2021-04-22 RX ADMIN — Medication 1 PATCH: at 09:23

## 2021-04-22 RX ADMIN — GABAPENTIN 300 MG: 300 CAPSULE ORAL at 15:00

## 2021-04-22 RX ADMIN — ROSUVASTATIN CALCIUM 40 MG: 10 TABLET, FILM COATED ORAL at 21:56

## 2021-04-22 RX ADMIN — ONDANSETRON 4 MG: 2 INJECTION INTRAMUSCULAR; INTRAVENOUS at 10:47

## 2021-04-22 RX ADMIN — ACETAMINOPHEN AND CODEINE PHOSPHATE 1 TABLET: 300; 30 TABLET ORAL at 16:26

## 2021-04-22 RX ADMIN — PROPOFOL 140 MG: 10 INJECTION, EMULSION INTRAVENOUS at 07:51

## 2021-04-22 RX ADMIN — BUTALBITAL, ACETAMINOPHEN AND CAFFEINE 2 TABLET: 50; 325; 40 TABLET ORAL at 09:17

## 2021-04-22 NOTE — ANESTHESIA PREPROCEDURE EVALUATION
Anesthesia Evaluation     Patient summary reviewed and Nursing notes reviewed   no history of anesthetic complications:  NPO Solid Status: > 8 hours  NPO Liquid Status: > 8 hours           Airway   Mallampati: II  TM distance: >3 FB  Neck ROM: full  No difficulty expected  Dental      Pulmonary - normal exam   (+) a smoker Current Abstained day of surgery,   Cardiovascular - negative cardio ROS and normal exam        Neuro/Psych  (+) CVA residual symptoms, psychiatric history,     GI/Hepatic/Renal/Endo - negative ROS     Musculoskeletal     (+) back pain, chronic pain,   Abdominal  - normal exam   Substance History - negative use     OB/GYN negative ob/gyn ROS         Other                        Anesthesia Plan    ASA 3     MAC     intravenous induction     Anesthetic plan, all risks, benefits, and alternatives have been provided, discussed and informed consent has been obtained with: patient.

## 2021-04-22 NOTE — ANESTHESIA POSTPROCEDURE EVALUATION
Patient: Magdalena WEBER Bee    Procedure Summary     Date: 04/22/21 Room / Location: [facility]    Anesthesia Start: 0748 Anesthesia Stop: 0803    Procedure: ADULT TRANSESOPHAGEAL ECHO (VARGHESE) W/ CONT IF NECESSARY PER PROTOCOL Diagnosis: (Cardiac Source of Emboli)    Scheduled Providers: Aaron Park MD Provider: Mike Ramirez MD    Anesthesia Type: MAC ASA Status: 3          Anesthesia Type: MAC    Vitals  Vitals Value Taken Time   /104 04/22/21 0845   Temp     Pulse 59 04/22/21 0849   Resp 21 04/22/21 0806   SpO2 100 % 04/22/21 0849   Vitals shown include unvalidated device data.        Post Anesthesia Care and Evaluation    Patient location during evaluation: PACU  Patient participation: complete - patient participated  Level of consciousness: awake  Pain scale: See nurse's notes for pain score.  Pain management: adequate  Airway patency: patent  Anesthetic complications: No anesthetic complications  PONV Status: none  Cardiovascular status: acceptable  Respiratory status: acceptable  Hydration status: acceptable    Comments: Patient seen and examined postoperatively; vital signs stable; SpO2 greater than or equal to 90%; cardiopulmonary status stable; nausea/vomiting adequately controlled; pain adequately controlled; no apparent anesthesia complications; patient discharged from anesthesia care when discharge criteria were met

## 2021-04-23 ENCOUNTER — APPOINTMENT (OUTPATIENT)
Dept: RESPIRATORY THERAPY | Facility: HOSPITAL | Age: 61
End: 2021-04-23

## 2021-04-23 VITALS
RESPIRATION RATE: 19 BRPM | SYSTOLIC BLOOD PRESSURE: 120 MMHG | DIASTOLIC BLOOD PRESSURE: 68 MMHG | WEIGHT: 181.22 LBS | HEART RATE: 70 BPM | TEMPERATURE: 98.1 F | OXYGEN SATURATION: 96 % | HEIGHT: 69 IN | BODY MASS INDEX: 26.84 KG/M2

## 2021-04-23 LAB
ANA SER QL: NEGATIVE
POTASSIUM SERPL-SCNC: 3.9 MMOL/L (ref 3.5–5.2)
QT INTERVAL: 432 MS

## 2021-04-23 PROCEDURE — 93270 REMOTE 30 DAY ECG REV/REPORT: CPT

## 2021-04-23 PROCEDURE — 99233 SBSQ HOSP IP/OBS HIGH 50: CPT | Performed by: NURSE PRACTITIONER

## 2021-04-23 PROCEDURE — 84132 ASSAY OF SERUM POTASSIUM: CPT | Performed by: NURSE PRACTITIONER

## 2021-04-23 PROCEDURE — 92526 ORAL FUNCTION THERAPY: CPT

## 2021-04-23 PROCEDURE — 99232 SBSQ HOSP IP/OBS MODERATE 35: CPT | Performed by: INTERNAL MEDICINE

## 2021-04-23 PROCEDURE — 99239 HOSP IP/OBS DSCHRG MGMT >30: CPT | Performed by: INTERNAL MEDICINE

## 2021-04-23 RX ORDER — CLOPIDOGREL BISULFATE 75 MG/1
75 TABLET ORAL DAILY
Qty: 30 TABLET
Start: 2021-04-24

## 2021-04-23 RX ORDER — ROSUVASTATIN CALCIUM 40 MG/1
40 TABLET, COATED ORAL NIGHTLY
Start: 2021-04-23 | End: 2021-06-28

## 2021-04-23 RX ORDER — CEFDINIR 300 MG/1
300 CAPSULE ORAL EVERY 12 HOURS SCHEDULED
Qty: 4 CAPSULE | Refills: 0 | Status: SHIPPED | OUTPATIENT
Start: 2021-04-23 | End: 2021-04-25

## 2021-04-23 RX ADMIN — CLOPIDOGREL BISULFATE 75 MG: 75 TABLET ORAL at 08:14

## 2021-04-23 RX ADMIN — BUSPIRONE HYDROCHLORIDE 10 MG: 10 TABLET ORAL at 08:14

## 2021-04-23 RX ADMIN — GABAPENTIN 300 MG: 300 CAPSULE ORAL at 08:14

## 2021-04-23 RX ADMIN — Medication 1 PATCH: at 08:15

## 2021-04-23 RX ADMIN — CEFDINIR 300 MG: 300 CAPSULE ORAL at 08:13

## 2021-04-23 RX ADMIN — BUSPIRONE HYDROCHLORIDE 10 MG: 10 TABLET ORAL at 16:09

## 2021-04-23 RX ADMIN — HYDROXYZINE HYDROCHLORIDE 25 MG: 25 TABLET, FILM COATED ORAL at 11:49

## 2021-04-23 RX ADMIN — Medication 3 ML: at 08:14

## 2021-04-23 RX ADMIN — GABAPENTIN 300 MG: 300 CAPSULE ORAL at 16:09

## 2021-04-23 RX ADMIN — ACETAMINOPHEN AND CODEINE PHOSPHATE 1 TABLET: 300; 30 TABLET ORAL at 08:14

## 2021-04-23 RX ADMIN — ACETAMINOPHEN AND CODEINE PHOSPHATE 1 TABLET: 300; 30 TABLET ORAL at 16:09

## 2021-04-25 LAB
C-ANCA TITR SER IF: NORMAL TITER
MYELOPEROXIDASE AB SER IA-ACNC: <9 U/ML (ref 0–9)
P-ANCA ATYPICAL TITR SER IF: NORMAL TITER
P-ANCA TITR SER IF: NORMAL TITER
PROTEINASE3 AB SER IA-ACNC: <3.5 U/ML (ref 0–3.5)

## 2021-04-26 NOTE — DISCHARGE SUMMARY
Date of Admission: 4/17/2021    Date of Discharge:  4/26/2021    Length of stay:  LOS: 1 day     Discharge Diagnosis:       Presenting Problem List:        HPI/Hospital Course:    Patient left AGAINST MEDICAL ADVICE      Procedures Performed         Consults:   Consults     Date and Time Order Name Status Description    4/17/2021  9:07 PM Inpatient Neurology Consult Stroke Completed           Pertinent Test Results:     Lab Results (last 24 hours)     Procedure Component Value Units Date/Time    Folate [[id number]]  (Normal) Collected: 04/17/21 2031    Specimen: Blood from Arm, Right Updated: 04/18/21 1758     Folate 5.68 ng/mL      Comment: Specimen hemolyzed.  Results may be affected.       Narrative:      Results may be falsely increased if patient taking Biotin.      Vitamin B12 [[id number]]  (Normal) Collected: 04/17/21 2031    Specimen: Blood from Arm, Right Updated: 04/18/21 1755     Vitamin B-12 346 pg/mL     Narrative:      Results may be falsely increased if patient taking Biotin.      COVID PRE-OP / PRE-PROCEDURE SCREENING ORDER (NO ISOLATION) - Swab, Nasopharynx [[id number]]  (Normal) Collected: 04/18/21 0409    Specimen: Swab from Nasopharynx Updated: 04/18/21 1610    Narrative:      The following orders were created for panel order COVID PRE-OP / PRE-PROCEDURE SCREENING ORDER (NO ISOLATION) - Swab, Nasopharynx.  Procedure                               Abnormality         Status                     ---------                               -----------         ------                     COVID-19,APTIMA PANTHER,...[[id number]]  Normal              Final result                 Please view results for these tests on the individual orders.    COVID-19,APTIMA PANTHER,[facility], NP/OP SWAB IN UTM/VTM/SALINE TRANSPORT MEDIA,24 HR TAT - Swab, Nasopharynx [[id number]]  (Normal) Collected: 04/18/21 0409    Specimen: Swab from Nasopharynx Updated: 04/18/21 1610     COVID19 Not Detected    Narrative:      Fact sheet  for providers: https://www.fda.gov/media/574934/download     Fact sheet for patients: https://www.fda.gov/media/250149/download    Test performed by RT PCR.    Urinalysis, Microscopic Only - Urine, Clean Catch [[id number]]  (Abnormal) Collected: 04/18/21 1314    Specimen: Urine, Clean Catch Updated: 04/18/21 1331     RBC, UA 3-5 /HPF      WBC, UA 31-50 /HPF      Bacteria, UA 3+ /HPF      Squamous Epithelial Cells, UA 0-2 /HPF      Hyaline Casts, UA 0-2 /LPF      Methodology Automated Microscopy    Urinalysis With Culture If Indicated - Urine, Clean Catch [[id number]]  (Abnormal) Collected: 04/18/21 1314    Specimen: Urine, Clean Catch Updated: 04/18/21 1331     Color, UA Yellow     Appearance, UA Cloudy     Comment: Result checked         pH, UA 6.0     Specific Gravity, UA 1.007     Glucose, UA Negative     Ketones, UA Negative     Bilirubin, UA Negative     Blood, UA Negative     Protein, UA Negative     Leuk Esterase, UA Moderate (2+)     Nitrite, UA Positive     Urobilinogen, UA 0.2 E.U./dL    Extra Tubes [[id number]] Collected: 04/18/21 0350    Specimen: Blood, Venous Line Updated: 04/18/21 0845    Narrative:      The following orders were created for panel order Extra Tubes.  Procedure                               Abnormality         Status                     ---------                               -----------         ------                     Lavender Top[[id number]]                                     Final result                 Please view results for these tests on the individual orders.    Lavender Top [[id number]] Collected: 04/18/21 0350    Specimen: Blood Updated: 04/18/21 0845     Extra Tube hold for add-on     Comment: Auto resulted       Urine Drug Screen - Urine, Clean Catch [[id number]]  (Abnormal) Collected: 04/18/21 0409    Specimen: Urine, Clean Catch Updated: 04/18/21 0451     Amphet/Methamphet, Screen Negative     Barbiturates Screen, Urine Negative     Benzodiazepine Screen, Urine Positive      Cocaine Screen, Urine Negative     Opiate Screen Negative     THC, Screen, Urine Negative     Methadone Screen, Urine Negative     Oxycodone Screen, Urine Negative    Narrative:      Negative Thresholds Per Drugs Screened:    Amphetamines                 500 ng/ml  Barbiturates                 200 ng/ml  Benzodiazepines              100 ng/ml  Cocaine                      300 ng/ml  Methadone                    300 ng/ml  Opiates                      300 ng/ml  Oxycodone                    100 ng/ml  THC                           50 ng/ml    The Normal Value for all drugs tested is negative. This report includes final unconfirmed screening results to be used for medical treatment purposes only. Unconfirmed results must not be used for non-medical purposes such as employment or legal testing. Clinical consideration should be applied to any drug of abuse test, particularly when unconfirmed results are used.          All urine drugs of abuse requests without chain of custody are for medical screening purposes only.  False positives are possible.      TSH [[id number]]  (Normal) Collected: 04/18/21 0350    Specimen: Blood Updated: 04/18/21 0433     TSH 2.510 uIU/mL     Ethanol [[id number]] Collected: 04/18/21 0350    Specimen: Blood Updated: 04/18/21 0426     Ethanol % <0.010 %     Narrative:      Plasma Ethanol Clinical Symptoms:    ETOH (%)               Clinical Symptom  .01-.05              No apparent influence  .03-.12              Euphoria, Diminished judgment and attention   .09-.25              Impaired comprehension, Muscle incoordination  .18-.30              Confusion, Staggered gait, Slurred speech  .25-.40              Markedly decreased response to stimuli, unable to stand or                        walk, vomitting, sleep or stupor  .35-.50              Comatose, Anesthesia, Subnormal body temperature        Lipid Panel [[id number]] Collected: 04/18/21 0350    Specimen: Blood Updated: 04/18/21 0426     Total  Cholesterol 120 mg/dL      Triglycerides 94 mg/dL      HDL Cholesterol 44 mg/dL      LDL Cholesterol  58 mg/dL      VLDL Cholesterol 18 mg/dL      LDL/HDL Ratio 1.30    Narrative:      Cholesterol Reference Ranges  (U.S. Department of Health and Human Services ATP III Classifications)    Desirable          <200 mg/dL  Borderline High    200-239 mg/dL  High Risk          >240 mg/dL      Triglyceride Reference Ranges  (U.S. Department of Health and Human Services ATP III Classifications)    Normal           <150 mg/dL  Borderline High  150-199 mg/dL  High             200-499 mg/dL  Very High        >500 mg/dL    HDL Reference Ranges  (U.S. Department of Health and Human Services ATP III Classifcations)    Low     <40 mg/dl (major risk factor for CHD)  High    >60 mg/dl ('negative' risk factor for CHD)        LDL Reference Ranges  (U.S. Department of Health and Human Services ATP III Classifcations)    Optimal          <100 mg/dL  Near Optimal     100-129 mg/dL  Borderline High  130-159 mg/dL  High             160-189 mg/dL  Very High        >189 mg/dL          Microbiology Results Abnormal     Procedure Component Value - Date/Time    Urine Culture - Urine, Urine, Clean Catch [[id number]]  (Abnormal)  (Susceptibility) Collected: 04/18/21 1314    Lab Status: Final result Specimen: Urine, Clean Catch Updated: 04/20/21 1006     Urine Culture >100,000 CFU/mL Escherichia coli    Susceptibility      Escherichia coli      ZAN      Ampicillin Resistant      Ampicillin + Sulbactam Intermediate      Cefazolin Susceptible      Cefepime Susceptible      Ceftazidime Susceptible      Ceftriaxone Susceptible      Gentamicin Susceptible      Levofloxacin Susceptible      Nitrofurantoin Susceptible      Piperacillin + Tazobactam Susceptible      Tetracycline Susceptible      Trimethoprim + Sulfamethoxazole Susceptible               Linear View                   COVID PRE-OP / PRE-PROCEDURE SCREENING ORDER (NO ISOLATION) - Swab,  Nasopharynx [[id number]]  (Normal) Collected: 04/18/21 0409    Lab Status: Final result Specimen: Swab from Nasopharynx Updated: 04/18/21 1610    Narrative:      The following orders were created for panel order COVID PRE-OP / PRE-PROCEDURE SCREENING ORDER (NO ISOLATION) - Swab, Nasopharynx.  Procedure                               Abnormality         Status                     ---------                               -----------         ------                     COVID-19,APTIMA PANTHER,...[[id number]]  Normal              Final result                 Please view results for these tests on the individual orders.    COVID-19,APTIMA PANTHER,[facility], NP/OP SWAB IN UTM/VTM/SALINE TRANSPORT MEDIA,24 HR TAT - Swab, Nasopharynx [[id number]]  (Normal) Collected: 04/18/21 0409    Lab Status: Final result Specimen: Swab from Nasopharynx Updated: 04/18/21 1610     COVID19 Not Detected    Narrative:      Fact sheet for providers: https://www.fda.gov/media/227246/download     Fact sheet for patients: https://www.fda.gov/media/753537/download    Test performed by RT PCR.        No radiology results for the last day    Results for orders placed during the hospital encounter of 04/17/21    Adult Transthoracic Echo Complete W/ Cont if Necessary Per Protocol (With Agitated Saline)    Interpretation Summary  · Left ventricular ejection fraction appears to be 56 - 60%.  · The right ventricular cavity is mildly dilated.  · No pericardial effusion noted  · Saline test results are negative.        Condition on Discharge:      Vital Signs       Physical Exam:        Discharge Disposition  Left Against Medical Advice [7]    Discharge Medications     Discharge Medications      ASK your doctor about these medications      Instructions Start Date   acetaminophen-codeine 300-30 MG per tablet  Commonly known as: TYLENOL #3   1 tablet, Oral, 3 Times Daily PRN      albuterol sulfate  (90 Base) MCG/ACT inhaler  Commonly known as: PROVENTIL  HFA;VENTOLIN HFA;PROAIR HFA   2 puffs, Inhalation, Every 4 Hours PRN      Anoro Ellipta 62.5-25 MCG/INH aerosol powder  inhaler  Generic drug: umeclidinium-vilanterol   1 puff, Inhalation, Daily - RT      busPIRone 10 MG tablet  Commonly known as: BUSPAR   10 mg, Oral, 3 Times Daily      gabapentin 800 MG tablet  Commonly known as: NEURONTIN   800 mg, Oral, 4 Times Daily             Discharge Diet:       Activity at Discharge:       Follow-up Appointments  No future appointments.      Test Results Pending at Discharge       Risk for Readmission (LACE) Score: 9 (4/23/2021  6:01 AM)        Alvin Bowles MD  04/26/21  16:36 EDT     patient, family, , and , etc., and follow-up appointments with PCP and specialists as recommended.

## 2021-04-30 ENCOUNTER — HOSPITAL ENCOUNTER (INPATIENT)
Facility: HOSPITAL | Age: 61
LOS: 1 days | Discharge: NURSING, REHAB OR LONG-TERM CARE | End: 2021-05-04
Attending: INTERNAL MEDICINE | Admitting: INTERNAL MEDICINE

## 2021-04-30 PROBLEM — F17.200 TOBACCO DEPENDENCE SYNDROME: Status: ACTIVE | Noted: 2021-04-30

## 2021-04-30 PROBLEM — F41.0 PANIC ATTACK: Status: ACTIVE | Noted: 2017-09-27

## 2021-04-30 PROBLEM — R32 URINARY INCONTINENCE: Status: ACTIVE | Noted: 2018-09-12

## 2021-04-30 PROBLEM — F17.200 TOBACCO DEPENDENCE SYNDROME: Chronic | Status: ACTIVE | Noted: 2021-04-30

## 2021-04-30 PROBLEM — I10 BENIGN ESSENTIAL HYPERTENSION: Chronic | Status: ACTIVE | Noted: 2018-06-12

## 2021-04-30 PROBLEM — F43.10 POSTTRAUMATIC STRESS DISORDER: Status: ACTIVE | Noted: 2017-09-27

## 2021-04-30 PROBLEM — I63.9 CEREBROVASCULAR ACCIDENT (CVA) (HCC): Chronic | Status: ACTIVE | Noted: 2021-04-19

## 2021-04-30 PROBLEM — F41.8 MIXED ANXIETY AND DEPRESSIVE DISORDER: Chronic | Status: ACTIVE | Noted: 2017-09-27

## 2021-04-30 PROBLEM — I10 BENIGN ESSENTIAL HYPERTENSION: Status: ACTIVE | Noted: 2018-06-12

## 2021-04-30 PROBLEM — R51.9 HEADACHE: Status: ACTIVE | Noted: 2021-04-30

## 2021-04-30 PROBLEM — F10.20 ALCOHOLISM: Status: ACTIVE | Noted: 2017-09-27

## 2021-04-30 PROBLEM — R53.1 GENERALIZED WEAKNESS: Status: ACTIVE | Noted: 2021-04-30

## 2021-04-30 PROBLEM — F43.10 POSTTRAUMATIC STRESS DISORDER: Chronic | Status: ACTIVE | Noted: 2017-09-27

## 2021-04-30 PROBLEM — F41.8 MIXED ANXIETY AND DEPRESSIVE DISORDER: Status: ACTIVE | Noted: 2017-09-27

## 2021-04-30 LAB
ALBUMIN SERPL-MCNC: 3.3 G/DL (ref 3.5–5.2)
ALBUMIN/GLOB SERPL: 1.2 G/DL
ALP SERPL-CCNC: 71 U/L (ref 39–117)
ALT SERPL W P-5'-P-CCNC: 10 U/L (ref 1–33)
ANION GAP SERPL CALCULATED.3IONS-SCNC: 10 MMOL/L (ref 5–15)
AST SERPL-CCNC: 16 U/L (ref 1–32)
BASOPHILS # BLD AUTO: 0 10*3/MM3 (ref 0–0.2)
BASOPHILS NFR BLD AUTO: 0.3 % (ref 0–1.5)
BILIRUB SERPL-MCNC: <0.2 MG/DL (ref 0–1.2)
BUN SERPL-MCNC: 11 MG/DL (ref 8–23)
BUN/CREAT SERPL: 11.2 (ref 7–25)
CALCIUM SPEC-SCNC: 8.5 MG/DL (ref 8.6–10.5)
CHLORIDE SERPL-SCNC: 106 MMOL/L (ref 98–107)
CO2 SERPL-SCNC: 24 MMOL/L (ref 22–29)
CREAT SERPL-MCNC: 0.98 MG/DL (ref 0.57–1)
DEPRECATED RDW RBC AUTO: 52.9 FL (ref 37–54)
EOSINOPHIL # BLD AUTO: 0.1 10*3/MM3 (ref 0–0.4)
EOSINOPHIL NFR BLD AUTO: 1.3 % (ref 0.3–6.2)
ERYTHROCYTE [DISTWIDTH] IN BLOOD BY AUTOMATED COUNT: 17 % (ref 12.3–15.4)
GFR SERPL CREATININE-BSD FRML MDRD: 58 ML/MIN/1.73
GLOBULIN UR ELPH-MCNC: 2.7 GM/DL
GLUCOSE SERPL-MCNC: 136 MG/DL (ref 65–99)
HCT VFR BLD AUTO: 35.3 % (ref 34–46.6)
HGB BLD-MCNC: 11.8 G/DL (ref 12–15.9)
LYMPHOCYTES # BLD AUTO: 2.3 10*3/MM3 (ref 0.7–3.1)
LYMPHOCYTES NFR BLD AUTO: 30.2 % (ref 19.6–45.3)
MCH RBC QN AUTO: 30.1 PG (ref 26.6–33)
MCHC RBC AUTO-ENTMCNC: 33.5 G/DL (ref 31.5–35.7)
MCV RBC AUTO: 90 FL (ref 79–97)
MONOCYTES # BLD AUTO: 0.5 10*3/MM3 (ref 0.1–0.9)
MONOCYTES NFR BLD AUTO: 7.1 % (ref 5–12)
NEUTROPHILS NFR BLD AUTO: 4.6 10*3/MM3 (ref 1.7–7)
NEUTROPHILS NFR BLD AUTO: 61.1 % (ref 42.7–76)
NRBC BLD AUTO-RTO: 0 /100 WBC (ref 0–0.2)
PLATELET # BLD AUTO: 333 10*3/MM3 (ref 140–450)
PMV BLD AUTO: 8.1 FL (ref 6–12)
POTASSIUM SERPL-SCNC: 4 MMOL/L (ref 3.5–5.2)
PROT SERPL-MCNC: 6 G/DL (ref 6–8.5)
RBC # BLD AUTO: 3.92 10*6/MM3 (ref 3.77–5.28)
SODIUM SERPL-SCNC: 140 MMOL/L (ref 136–145)
WBC # BLD AUTO: 7.6 10*3/MM3 (ref 3.4–10.8)

## 2021-04-30 PROCEDURE — 99222 1ST HOSP IP/OBS MODERATE 55: CPT | Performed by: PHYSICIAN ASSISTANT

## 2021-04-30 PROCEDURE — 85025 COMPLETE CBC W/AUTO DIFF WBC: CPT | Performed by: PHYSICIAN ASSISTANT

## 2021-04-30 PROCEDURE — G0378 HOSPITAL OBSERVATION PER HR: HCPCS

## 2021-04-30 PROCEDURE — 80053 COMPREHEN METABOLIC PANEL: CPT | Performed by: PHYSICIAN ASSISTANT

## 2021-04-30 RX ORDER — NICOTINE 21 MG/24HR
1 PATCH, TRANSDERMAL 24 HOURS TRANSDERMAL
Status: DISCONTINUED | OUTPATIENT
Start: 2021-05-01 | End: 2021-05-04 | Stop reason: HOSPADM

## 2021-04-30 RX ORDER — NITROGLYCERIN 0.4 MG/1
0.4 TABLET SUBLINGUAL
Status: DISCONTINUED | OUTPATIENT
Start: 2021-04-30 | End: 2021-05-04 | Stop reason: HOSPADM

## 2021-04-30 RX ORDER — POTASSIUM CHLORIDE 20 MEQ/1
40 TABLET, EXTENDED RELEASE ORAL AS NEEDED
Status: DISCONTINUED | OUTPATIENT
Start: 2021-04-30 | End: 2021-05-04 | Stop reason: HOSPADM

## 2021-04-30 RX ORDER — ACETAMINOPHEN AND CODEINE PHOSPHATE 300; 30 MG/1; MG/1
1 TABLET ORAL 3 TIMES DAILY PRN
Status: DISCONTINUED | OUTPATIENT
Start: 2021-04-30 | End: 2021-05-04 | Stop reason: HOSPADM

## 2021-04-30 RX ORDER — SODIUM CHLORIDE 0.9 % (FLUSH) 0.9 %
10 SYRINGE (ML) INJECTION EVERY 12 HOURS SCHEDULED
Status: DISCONTINUED | OUTPATIENT
Start: 2021-04-30 | End: 2021-05-04 | Stop reason: HOSPADM

## 2021-04-30 RX ORDER — ALUMINA, MAGNESIA, AND SIMETHICONE 2400; 2400; 240 MG/30ML; MG/30ML; MG/30ML
15 SUSPENSION ORAL EVERY 6 HOURS PRN
Status: DISCONTINUED | OUTPATIENT
Start: 2021-04-30 | End: 2021-05-04 | Stop reason: HOSPADM

## 2021-04-30 RX ORDER — ONDANSETRON 4 MG/1
4 TABLET, FILM COATED ORAL EVERY 6 HOURS PRN
Status: DISCONTINUED | OUTPATIENT
Start: 2021-04-30 | End: 2021-05-04 | Stop reason: HOSPADM

## 2021-04-30 RX ORDER — CHOLECALCIFEROL (VITAMIN D3) 125 MCG
5 CAPSULE ORAL NIGHTLY PRN
Status: DISCONTINUED | OUTPATIENT
Start: 2021-04-30 | End: 2021-05-04 | Stop reason: HOSPADM

## 2021-04-30 RX ORDER — IPRATROPIUM BROMIDE AND ALBUTEROL SULFATE 2.5; .5 MG/3ML; MG/3ML
3 SOLUTION RESPIRATORY (INHALATION)
Status: DISCONTINUED | OUTPATIENT
Start: 2021-04-30 | End: 2021-05-04 | Stop reason: HOSPADM

## 2021-04-30 RX ORDER — CLOPIDOGREL BISULFATE 75 MG/1
75 TABLET ORAL DAILY
Status: DISCONTINUED | OUTPATIENT
Start: 2021-05-01 | End: 2021-05-04 | Stop reason: HOSPADM

## 2021-04-30 RX ORDER — BUSPIRONE HYDROCHLORIDE 10 MG/1
10 TABLET ORAL 3 TIMES DAILY
Status: DISCONTINUED | OUTPATIENT
Start: 2021-04-30 | End: 2021-05-04 | Stop reason: HOSPADM

## 2021-04-30 RX ORDER — ONDANSETRON 2 MG/ML
4 INJECTION INTRAMUSCULAR; INTRAVENOUS EVERY 6 HOURS PRN
Status: DISCONTINUED | OUTPATIENT
Start: 2021-04-30 | End: 2021-05-04 | Stop reason: HOSPADM

## 2021-04-30 RX ORDER — ACETAMINOPHEN 650 MG/1
650 SUPPOSITORY RECTAL EVERY 4 HOURS PRN
Status: DISCONTINUED | OUTPATIENT
Start: 2021-04-30 | End: 2021-05-04 | Stop reason: HOSPADM

## 2021-04-30 RX ORDER — ROSUVASTATIN CALCIUM 10 MG/1
40 TABLET, COATED ORAL NIGHTLY
Status: DISCONTINUED | OUTPATIENT
Start: 2021-04-30 | End: 2021-05-04 | Stop reason: HOSPADM

## 2021-04-30 RX ORDER — ALBUTEROL SULFATE 2.5 MG/3ML
2.5 SOLUTION RESPIRATORY (INHALATION) EVERY 4 HOURS PRN
Status: DISCONTINUED | OUTPATIENT
Start: 2021-04-30 | End: 2021-05-04 | Stop reason: HOSPADM

## 2021-04-30 RX ORDER — GABAPENTIN 400 MG/1
800 CAPSULE ORAL EVERY 8 HOURS SCHEDULED
Status: DISCONTINUED | OUTPATIENT
Start: 2021-04-30 | End: 2021-05-04 | Stop reason: HOSPADM

## 2021-04-30 RX ORDER — SODIUM CHLORIDE 0.9 % (FLUSH) 0.9 %
10 SYRINGE (ML) INJECTION AS NEEDED
Status: DISCONTINUED | OUTPATIENT
Start: 2021-04-30 | End: 2021-05-04 | Stop reason: HOSPADM

## 2021-04-30 RX ORDER — ACETAMINOPHEN 325 MG/1
650 TABLET ORAL EVERY 4 HOURS PRN
Status: DISCONTINUED | OUTPATIENT
Start: 2021-04-30 | End: 2021-05-04 | Stop reason: HOSPADM

## 2021-04-30 RX ORDER — MAGNESIUM SULFATE 1 G/100ML
1 INJECTION INTRAVENOUS AS NEEDED
Status: DISCONTINUED | OUTPATIENT
Start: 2021-04-30 | End: 2021-05-04 | Stop reason: HOSPADM

## 2021-04-30 RX ORDER — MAGNESIUM SULFATE HEPTAHYDRATE 40 MG/ML
2 INJECTION, SOLUTION INTRAVENOUS AS NEEDED
Status: DISCONTINUED | OUTPATIENT
Start: 2021-04-30 | End: 2021-05-04 | Stop reason: HOSPADM

## 2021-04-30 RX ORDER — ACETAMINOPHEN 160 MG/5ML
650 SOLUTION ORAL EVERY 4 HOURS PRN
Status: DISCONTINUED | OUTPATIENT
Start: 2021-04-30 | End: 2021-05-04 | Stop reason: HOSPADM

## 2021-04-30 RX ADMIN — ACETAMINOPHEN 650 MG: 325 TABLET, FILM COATED ORAL at 22:53

## 2021-04-30 RX ADMIN — Medication 5 MG: at 22:56

## 2021-04-30 RX ADMIN — ROSUVASTATIN CALCIUM 40 MG: 10 TABLET, FILM COATED ORAL at 22:54

## 2021-04-30 RX ADMIN — VERAPAMIL HYDROCHLORIDE 120 MG: 120 TABLET, FILM COATED, EXTENDED RELEASE ORAL at 22:55

## 2021-04-30 RX ADMIN — Medication 10 ML: at 22:57

## 2021-04-30 RX ADMIN — BUSPIRONE HYDROCHLORIDE 10 MG: 10 TABLET ORAL at 22:55

## 2021-04-30 RX ADMIN — GABAPENTIN 800 MG: 400 CAPSULE ORAL at 22:54

## 2021-05-01 ENCOUNTER — APPOINTMENT (OUTPATIENT)
Dept: MRI IMAGING | Facility: HOSPITAL | Age: 61
End: 2021-05-01

## 2021-05-01 LAB
ANION GAP SERPL CALCULATED.3IONS-SCNC: 9 MMOL/L (ref 5–15)
BASOPHILS # BLD AUTO: 0 10*3/MM3 (ref 0–0.2)
BASOPHILS NFR BLD AUTO: 0.6 % (ref 0–1.5)
BUN SERPL-MCNC: 12 MG/DL (ref 8–23)
BUN/CREAT SERPL: 12.6 (ref 7–25)
CALCIUM SPEC-SCNC: 8.7 MG/DL (ref 8.6–10.5)
CHLORIDE SERPL-SCNC: 106 MMOL/L (ref 98–107)
CO2 SERPL-SCNC: 26 MMOL/L (ref 22–29)
CREAT SERPL-MCNC: 0.95 MG/DL (ref 0.57–1)
DEPRECATED RDW RBC AUTO: 53.8 FL (ref 37–54)
EOSINOPHIL # BLD AUTO: 0.1 10*3/MM3 (ref 0–0.4)
EOSINOPHIL NFR BLD AUTO: 1.4 % (ref 0.3–6.2)
ERYTHROCYTE [DISTWIDTH] IN BLOOD BY AUTOMATED COUNT: 17.2 % (ref 12.3–15.4)
GFR SERPL CREATININE-BSD FRML MDRD: 60 ML/MIN/1.73
GLUCOSE SERPL-MCNC: 91 MG/DL (ref 65–99)
HCT VFR BLD AUTO: 37.8 % (ref 34–46.6)
HGB BLD-MCNC: 12.6 G/DL (ref 12–15.9)
LYMPHOCYTES # BLD AUTO: 2 10*3/MM3 (ref 0.7–3.1)
LYMPHOCYTES NFR BLD AUTO: 34.2 % (ref 19.6–45.3)
MAGNESIUM SERPL-MCNC: 2 MG/DL (ref 1.6–2.4)
MCH RBC QN AUTO: 30 PG (ref 26.6–33)
MCHC RBC AUTO-ENTMCNC: 33.3 G/DL (ref 31.5–35.7)
MCV RBC AUTO: 90.2 FL (ref 79–97)
MONOCYTES # BLD AUTO: 0.4 10*3/MM3 (ref 0.1–0.9)
MONOCYTES NFR BLD AUTO: 6.2 % (ref 5–12)
NEUTROPHILS NFR BLD AUTO: 3.3 10*3/MM3 (ref 1.7–7)
NEUTROPHILS NFR BLD AUTO: 57.6 % (ref 42.7–76)
NRBC BLD AUTO-RTO: 0.1 /100 WBC (ref 0–0.2)
PLATELET # BLD AUTO: 316 10*3/MM3 (ref 140–450)
PMV BLD AUTO: 8.5 FL (ref 6–12)
POTASSIUM SERPL-SCNC: 4 MMOL/L (ref 3.5–5.2)
RBC # BLD AUTO: 4.19 10*6/MM3 (ref 3.77–5.28)
SODIUM SERPL-SCNC: 141 MMOL/L (ref 136–145)
WBC # BLD AUTO: 5.8 10*3/MM3 (ref 3.4–10.8)

## 2021-05-01 PROCEDURE — 99233 SBSQ HOSP IP/OBS HIGH 50: CPT | Performed by: INTERNAL MEDICINE

## 2021-05-01 PROCEDURE — 94640 AIRWAY INHALATION TREATMENT: CPT

## 2021-05-01 PROCEDURE — 94799 UNLISTED PULMONARY SVC/PX: CPT

## 2021-05-01 PROCEDURE — 70551 MRI BRAIN STEM W/O DYE: CPT

## 2021-05-01 PROCEDURE — 99203 OFFICE O/P NEW LOW 30 MIN: CPT | Performed by: PSYCHIATRY & NEUROLOGY

## 2021-05-01 PROCEDURE — G0378 HOSPITAL OBSERVATION PER HR: HCPCS

## 2021-05-01 PROCEDURE — 83735 ASSAY OF MAGNESIUM: CPT | Performed by: PHYSICIAN ASSISTANT

## 2021-05-01 PROCEDURE — 63710000001 ONDANSETRON PER 8 MG: Performed by: PHYSICIAN ASSISTANT

## 2021-05-01 PROCEDURE — 80048 BASIC METABOLIC PNL TOTAL CA: CPT | Performed by: PHYSICIAN ASSISTANT

## 2021-05-01 PROCEDURE — 85025 COMPLETE CBC W/AUTO DIFF WBC: CPT | Performed by: PHYSICIAN ASSISTANT

## 2021-05-01 PROCEDURE — 25010000002 DIPHENHYDRAMINE PER 50 MG: Performed by: INTERNAL MEDICINE

## 2021-05-01 PROCEDURE — 99222 1ST HOSP IP/OBS MODERATE 55: CPT | Performed by: PSYCHIATRY & NEUROLOGY

## 2021-05-01 RX ORDER — RISPERIDONE 0.25 MG/1
0.25 TABLET ORAL 2 TIMES DAILY PRN
Status: DISCONTINUED | OUTPATIENT
Start: 2021-05-01 | End: 2021-05-04 | Stop reason: HOSPADM

## 2021-05-01 RX ORDER — TOPIRAMATE 25 MG/1
50 TABLET ORAL NIGHTLY
Status: DISCONTINUED | OUTPATIENT
Start: 2021-05-01 | End: 2021-05-04 | Stop reason: HOSPADM

## 2021-05-01 RX ORDER — HALOPERIDOL 2 MG/ML
2.5 SOLUTION ORAL EVERY 6 HOURS PRN
Status: DISCONTINUED | OUTPATIENT
Start: 2021-05-01 | End: 2021-05-01

## 2021-05-01 RX ORDER — LORAZEPAM 1 MG/1
1 TABLET ORAL EVERY 4 HOURS PRN
Status: DISCONTINUED | OUTPATIENT
Start: 2021-05-01 | End: 2021-05-04 | Stop reason: HOSPADM

## 2021-05-01 RX ORDER — BUTALBITAL, ACETAMINOPHEN AND CAFFEINE 50; 325; 40 MG/1; MG/1; MG/1
1 TABLET ORAL EVERY 4 HOURS PRN
Status: DISCONTINUED | OUTPATIENT
Start: 2021-05-01 | End: 2021-05-01

## 2021-05-01 RX ORDER — BUTALBITAL, ACETAMINOPHEN AND CAFFEINE 50; 325; 40 MG/1; MG/1; MG/1
2 TABLET ORAL EVERY 6 HOURS PRN
Status: DISCONTINUED | OUTPATIENT
Start: 2021-05-01 | End: 2021-05-04 | Stop reason: HOSPADM

## 2021-05-01 RX ORDER — RISPERIDONE 0.25 MG/1
0.25 TABLET ORAL 2 TIMES DAILY
Status: DISCONTINUED | OUTPATIENT
Start: 2021-05-01 | End: 2021-05-03

## 2021-05-01 RX ORDER — LORAZEPAM 0.5 MG/1
0.5 TABLET ORAL EVERY 4 HOURS PRN
Status: DISCONTINUED | OUTPATIENT
Start: 2021-05-01 | End: 2021-05-01

## 2021-05-01 RX ORDER — DIPHENHYDRAMINE HYDROCHLORIDE 50 MG/ML
25 INJECTION INTRAMUSCULAR; INTRAVENOUS EVERY 6 HOURS PRN
Status: DISCONTINUED | OUTPATIENT
Start: 2021-05-01 | End: 2021-05-04 | Stop reason: HOSPADM

## 2021-05-01 RX ADMIN — GABAPENTIN 800 MG: 400 CAPSULE ORAL at 05:45

## 2021-05-01 RX ADMIN — ROSUVASTATIN CALCIUM 40 MG: 10 TABLET, FILM COATED ORAL at 20:02

## 2021-05-01 RX ADMIN — Medication 10 ML: at 08:23

## 2021-05-01 RX ADMIN — BUSPIRONE HYDROCHLORIDE 10 MG: 10 TABLET ORAL at 08:22

## 2021-05-01 RX ADMIN — DIPHENHYDRAMINE HYDROCHLORIDE 25 MG: 50 INJECTION, SOLUTION INTRAMUSCULAR; INTRAVENOUS at 11:36

## 2021-05-01 RX ADMIN — LORAZEPAM 1 MG: 1 TABLET ORAL at 11:36

## 2021-05-01 RX ADMIN — TOPIRAMATE 50 MG: 25 TABLET, FILM COATED ORAL at 20:02

## 2021-05-01 RX ADMIN — RISPERIDONE 0.25 MG: 0.25 TABLET ORAL at 20:02

## 2021-05-01 RX ADMIN — BUTALBITAL, ACETAMINOPHEN AND CAFFEINE 2 TABLET: 50; 325; 40 TABLET ORAL at 22:00

## 2021-05-01 RX ADMIN — BUTALBITAL, ACETAMINOPHEN AND CAFFEINE 2 TABLET: 50; 325; 40 TABLET ORAL at 14:19

## 2021-05-01 RX ADMIN — Medication 1 PATCH: at 08:22

## 2021-05-01 RX ADMIN — RISPERIDONE 0.25 MG: 0.25 TABLET ORAL at 14:20

## 2021-05-01 RX ADMIN — IPRATROPIUM BROMIDE AND ALBUTEROL SULFATE 3 ML: 2.5; .5 SOLUTION RESPIRATORY (INHALATION) at 07:20

## 2021-05-01 RX ADMIN — BUSPIRONE HYDROCHLORIDE 10 MG: 10 TABLET ORAL at 20:02

## 2021-05-01 RX ADMIN — ACETAMINOPHEN 650 MG: 325 TABLET, FILM COATED ORAL at 05:44

## 2021-05-01 RX ADMIN — BUTALBITAL, ACETAMINOPHEN AND CAFFEINE 1 TABLET: 50; 325; 40 TABLET ORAL at 08:21

## 2021-05-01 RX ADMIN — GABAPENTIN 800 MG: 400 CAPSULE ORAL at 20:02

## 2021-05-01 RX ADMIN — IPRATROPIUM BROMIDE AND ALBUTEROL SULFATE 3 ML: 2.5; .5 SOLUTION RESPIRATORY (INHALATION) at 23:39

## 2021-05-01 RX ADMIN — CLOPIDOGREL BISULFATE 75 MG: 75 TABLET ORAL at 08:22

## 2021-05-01 RX ADMIN — ONDANSETRON HYDROCHLORIDE 4 MG: 4 TABLET, FILM COATED ORAL at 19:56

## 2021-05-01 RX ADMIN — Medication 10 ML: at 20:03

## 2021-05-01 RX ADMIN — LORAZEPAM 1 MG: 1 TABLET ORAL at 19:57

## 2021-05-01 RX ADMIN — GABAPENTIN 800 MG: 400 CAPSULE ORAL at 14:20

## 2021-05-01 RX ADMIN — ACETAMINOPHEN AND CODEINE PHOSPHATE 1 TABLET: 300; 30 TABLET ORAL at 22:00

## 2021-05-01 RX ADMIN — VERAPAMIL HYDROCHLORIDE 120 MG: 120 TABLET, FILM COATED, EXTENDED RELEASE ORAL at 20:02

## 2021-05-01 RX ADMIN — ACETAMINOPHEN AND CODEINE PHOSPHATE 1 TABLET: 300; 30 TABLET ORAL at 17:37

## 2021-05-01 RX ADMIN — Medication 5 MG: at 19:56

## 2021-05-01 RX ADMIN — LORAZEPAM 0.5 MG: 0.5 TABLET ORAL at 08:21

## 2021-05-01 RX ADMIN — ACETAMINOPHEN AND CODEINE PHOSPHATE 1 TABLET: 300; 30 TABLET ORAL at 11:17

## 2021-05-01 RX ADMIN — BUSPIRONE HYDROCHLORIDE 10 MG: 10 TABLET ORAL at 17:38

## 2021-05-02 ENCOUNTER — APPOINTMENT (OUTPATIENT)
Dept: CT IMAGING | Facility: HOSPITAL | Age: 61
End: 2021-05-02

## 2021-05-02 PROBLEM — N30.00 ACUTE CYSTITIS WITHOUT HEMATURIA: Status: ACTIVE | Noted: 2021-05-02

## 2021-05-02 LAB
BACTERIA UR QL AUTO: ABNORMAL /HPF
BILIRUB UR QL STRIP: NEGATIVE
CHOLEST SERPL-MCNC: 104 MG/DL (ref 0–200)
CLARITY UR: ABNORMAL
COLOR UR: YELLOW
GLUCOSE UR STRIP-MCNC: NEGATIVE MG/DL
HDLC SERPL-MCNC: 43 MG/DL (ref 40–60)
HGB UR QL STRIP.AUTO: ABNORMAL
HYALINE CASTS UR QL AUTO: ABNORMAL /LPF
KETONES UR QL STRIP: NEGATIVE
LDLC SERPL CALC-MCNC: 38 MG/DL (ref 0–100)
LDLC/HDLC SERPL: 0.82 {RATIO}
LEUKOCYTE ESTERASE UR QL STRIP.AUTO: ABNORMAL
MAGNESIUM SERPL-MCNC: 1.8 MG/DL (ref 1.6–2.4)
NITRITE UR QL STRIP: NEGATIVE
PH UR STRIP.AUTO: 6 [PH] (ref 5–8)
POTASSIUM SERPL-SCNC: 4.4 MMOL/L (ref 3.5–5.2)
PROT UR QL STRIP: NEGATIVE
RBC # UR: ABNORMAL /HPF
REF LAB TEST METHOD: ABNORMAL
SP GR UR STRIP: 1.01 (ref 1–1.03)
SQUAMOUS #/AREA URNS HPF: ABNORMAL /HPF
TRIGL SERPL-MCNC: 128 MG/DL (ref 0–150)
TSH SERPL DL<=0.05 MIU/L-ACNC: 2.06 UIU/ML (ref 0.27–4.2)
UROBILINOGEN UR QL STRIP: ABNORMAL
VIT B12 BLD-MCNC: 428 PG/ML (ref 211–946)
VLDLC SERPL-MCNC: 23 MG/DL (ref 5–40)
WBC UR QL AUTO: ABNORMAL /HPF

## 2021-05-02 PROCEDURE — 92610 EVALUATE SWALLOWING FUNCTION: CPT

## 2021-05-02 PROCEDURE — 87086 URINE CULTURE/COLONY COUNT: CPT | Performed by: PHYSICIAN ASSISTANT

## 2021-05-02 PROCEDURE — 94799 UNLISTED PULMONARY SVC/PX: CPT

## 2021-05-02 PROCEDURE — 81001 URINALYSIS AUTO W/SCOPE: CPT | Performed by: PHYSICIAN ASSISTANT

## 2021-05-02 PROCEDURE — G0378 HOSPITAL OBSERVATION PER HR: HCPCS

## 2021-05-02 PROCEDURE — 80061 LIPID PANEL: CPT | Performed by: PSYCHIATRY & NEUROLOGY

## 2021-05-02 PROCEDURE — 25010000002 DEXAMETHASONE SODIUM PHOSPHATE 20 MG/5ML SOLUTION 5 ML VIAL: Performed by: PSYCHIATRY & NEUROLOGY

## 2021-05-02 PROCEDURE — 82607 VITAMIN B-12: CPT | Performed by: PSYCHIATRY & NEUROLOGY

## 2021-05-02 PROCEDURE — 99233 SBSQ HOSP IP/OBS HIGH 50: CPT | Performed by: INTERNAL MEDICINE

## 2021-05-02 PROCEDURE — 63710000001 METHYLPREDNISOLONE PER 4 MG: Performed by: PSYCHIATRY & NEUROLOGY

## 2021-05-02 PROCEDURE — 83036 HEMOGLOBIN GLYCOSYLATED A1C: CPT | Performed by: PSYCHIATRY & NEUROLOGY

## 2021-05-02 PROCEDURE — 97162 PT EVAL MOD COMPLEX 30 MIN: CPT

## 2021-05-02 PROCEDURE — 83735 ASSAY OF MAGNESIUM: CPT | Performed by: PHYSICIAN ASSISTANT

## 2021-05-02 PROCEDURE — 25010000002 CEFTRIAXONE PER 250 MG: Performed by: INTERNAL MEDICINE

## 2021-05-02 PROCEDURE — 25010000002 MORPHINE PER 10 MG: Performed by: INTERNAL MEDICINE

## 2021-05-02 PROCEDURE — 87040 BLOOD CULTURE FOR BACTERIA: CPT | Performed by: INTERNAL MEDICINE

## 2021-05-02 PROCEDURE — 87077 CULTURE AEROBIC IDENTIFY: CPT | Performed by: PHYSICIAN ASSISTANT

## 2021-05-02 PROCEDURE — 84443 ASSAY THYROID STIM HORMONE: CPT | Performed by: PSYCHIATRY & NEUROLOGY

## 2021-05-02 PROCEDURE — 25010000002 MORPHINE PER 10 MG: Performed by: NURSE PRACTITIONER

## 2021-05-02 PROCEDURE — 25010000002 ONDANSETRON PER 1 MG: Performed by: PHYSICIAN ASSISTANT

## 2021-05-02 PROCEDURE — 99231 SBSQ HOSP IP/OBS SF/LOW 25: CPT | Performed by: PSYCHIATRY & NEUROLOGY

## 2021-05-02 PROCEDURE — 84132 ASSAY OF SERUM POTASSIUM: CPT | Performed by: PHYSICIAN ASSISTANT

## 2021-05-02 PROCEDURE — 70450 CT HEAD/BRAIN W/O DYE: CPT

## 2021-05-02 PROCEDURE — 87186 SC STD MICRODIL/AGAR DIL: CPT | Performed by: PHYSICIAN ASSISTANT

## 2021-05-02 PROCEDURE — 99212 OFFICE O/P EST SF 10 MIN: CPT | Performed by: PSYCHIATRY & NEUROLOGY

## 2021-05-02 RX ORDER — METHYLPREDNISOLONE 4 MG/1
4 TABLET ORAL
Status: ACTIVE | OUTPATIENT
Start: 2021-05-02 | End: 2021-05-03

## 2021-05-02 RX ORDER — METHYLPREDNISOLONE 4 MG/1
8 TABLET ORAL
Status: COMPLETED | OUTPATIENT
Start: 2021-05-03 | End: 2021-05-03

## 2021-05-02 RX ORDER — METHYLPREDNISOLONE 4 MG/1
4 TABLET ORAL
Status: COMPLETED | OUTPATIENT
Start: 2021-05-03 | End: 2021-05-03

## 2021-05-02 RX ORDER — METHYLPREDNISOLONE 4 MG/1
8 TABLET ORAL
Status: COMPLETED | OUTPATIENT
Start: 2021-05-02 | End: 2021-05-02

## 2021-05-02 RX ORDER — METHYLPREDNISOLONE 4 MG/1
4 TABLET ORAL
Status: DISCONTINUED | OUTPATIENT
Start: 2021-05-07 | End: 2021-05-04 | Stop reason: HOSPADM

## 2021-05-02 RX ORDER — MORPHINE SULFATE 4 MG/ML
2 INJECTION, SOLUTION INTRAMUSCULAR; INTRAVENOUS ONCE
Status: COMPLETED | OUTPATIENT
Start: 2021-05-02 | End: 2021-05-02

## 2021-05-02 RX ORDER — METHYLPREDNISOLONE 4 MG/1
4 TABLET ORAL
Status: DISCONTINUED | OUTPATIENT
Start: 2021-05-04 | End: 2021-05-04 | Stop reason: HOSPADM

## 2021-05-02 RX ORDER — METHYLPREDNISOLONE 4 MG/1
4 TABLET ORAL
Status: DISCONTINUED | OUTPATIENT
Start: 2021-05-05 | End: 2021-05-04 | Stop reason: HOSPADM

## 2021-05-02 RX ORDER — METHYLPREDNISOLONE 4 MG/1
4 TABLET ORAL
Status: COMPLETED | OUTPATIENT
Start: 2021-05-02 | End: 2021-05-02

## 2021-05-02 RX ORDER — MORPHINE SULFATE 4 MG/ML
2 INJECTION, SOLUTION INTRAMUSCULAR; INTRAVENOUS EVERY 4 HOURS PRN
Status: DISCONTINUED | OUTPATIENT
Start: 2021-05-02 | End: 2021-05-04 | Stop reason: HOSPADM

## 2021-05-02 RX ORDER — METHYLPREDNISOLONE 4 MG/1
4 TABLET ORAL
Status: DISCONTINUED | OUTPATIENT
Start: 2021-05-06 | End: 2021-05-04 | Stop reason: HOSPADM

## 2021-05-02 RX ADMIN — BUTALBITAL, ACETAMINOPHEN AND CAFFEINE 2 TABLET: 50; 325; 40 TABLET ORAL at 16:12

## 2021-05-02 RX ADMIN — IPRATROPIUM BROMIDE AND ALBUTEROL SULFATE 3 ML: 2.5; .5 SOLUTION RESPIRATORY (INHALATION) at 15:50

## 2021-05-02 RX ADMIN — RISPERIDONE 0.25 MG: 0.25 TABLET ORAL at 12:40

## 2021-05-02 RX ADMIN — CLOPIDOGREL BISULFATE 75 MG: 75 TABLET ORAL at 10:54

## 2021-05-02 RX ADMIN — BUSPIRONE HYDROCHLORIDE 10 MG: 10 TABLET ORAL at 20:01

## 2021-05-02 RX ADMIN — METHYLPREDNISOLONE 4 MG: 4 TABLET ORAL at 17:41

## 2021-05-02 RX ADMIN — Medication 1 PATCH: at 08:34

## 2021-05-02 RX ADMIN — Medication 5 MG: at 21:18

## 2021-05-02 RX ADMIN — MORPHINE SULFATE 2 MG: 4 INJECTION INTRAVENOUS at 05:55

## 2021-05-02 RX ADMIN — MORPHINE SULFATE 2 MG: 4 INJECTION INTRAVENOUS at 20:03

## 2021-05-02 RX ADMIN — Medication 10 ML: at 20:04

## 2021-05-02 RX ADMIN — GABAPENTIN 800 MG: 400 CAPSULE ORAL at 15:33

## 2021-05-02 RX ADMIN — ONDANSETRON 4 MG: 2 INJECTION INTRAMUSCULAR; INTRAVENOUS at 08:32

## 2021-05-02 RX ADMIN — METHYLPREDNISOLONE 8 MG: 4 TABLET ORAL at 20:01

## 2021-05-02 RX ADMIN — BUSPIRONE HYDROCHLORIDE 10 MG: 10 TABLET ORAL at 10:54

## 2021-05-02 RX ADMIN — ROSUVASTATIN CALCIUM 40 MG: 10 TABLET, FILM COATED ORAL at 21:48

## 2021-05-02 RX ADMIN — VERAPAMIL HYDROCHLORIDE 120 MG: 120 TABLET, FILM COATED, EXTENDED RELEASE ORAL at 20:01

## 2021-05-02 RX ADMIN — IPRATROPIUM BROMIDE AND ALBUTEROL SULFATE 3 ML: 2.5; .5 SOLUTION RESPIRATORY (INHALATION) at 06:30

## 2021-05-02 RX ADMIN — GABAPENTIN 800 MG: 400 CAPSULE ORAL at 21:48

## 2021-05-02 RX ADMIN — BUSPIRONE HYDROCHLORIDE 10 MG: 10 TABLET ORAL at 15:34

## 2021-05-02 RX ADMIN — BUTALBITAL, ACETAMINOPHEN AND CAFFEINE 2 TABLET: 50; 325; 40 TABLET ORAL at 22:56

## 2021-05-02 RX ADMIN — TOPIRAMATE 50 MG: 25 TABLET, FILM COATED ORAL at 20:01

## 2021-05-02 RX ADMIN — MORPHINE SULFATE 2 MG: 4 INJECTION INTRAVENOUS at 12:39

## 2021-05-02 RX ADMIN — ONDANSETRON 4 MG: 2 INJECTION INTRAMUSCULAR; INTRAVENOUS at 20:02

## 2021-05-02 RX ADMIN — RISPERIDONE 0.25 MG: 0.25 TABLET ORAL at 10:54

## 2021-05-02 RX ADMIN — LORAZEPAM 1 MG: 1 TABLET ORAL at 22:56

## 2021-05-02 RX ADMIN — RISPERIDONE 0.25 MG: 0.25 TABLET ORAL at 20:02

## 2021-05-02 RX ADMIN — CEFTRIAXONE SODIUM 1 G: 1 INJECTION, POWDER, FOR SOLUTION INTRAMUSCULAR; INTRAVENOUS at 20:03

## 2021-05-02 RX ADMIN — Medication 10 ML: at 08:29

## 2021-05-02 RX ADMIN — DEXAMETHASONE SODIUM PHOSPHATE 10 MG: 4 INJECTION, SOLUTION INTRA-ARTICULAR; INTRALESIONAL; INTRAMUSCULAR; INTRAVENOUS; SOFT TISSUE at 17:41

## 2021-05-02 RX ADMIN — ACETAMINOPHEN AND CODEINE PHOSPHATE 1 TABLET: 300; 30 TABLET ORAL at 21:18

## 2021-05-02 RX ADMIN — MORPHINE SULFATE 2 MG: 4 INJECTION INTRAVENOUS at 08:28

## 2021-05-03 PROBLEM — I63.9 ACUTE CVA (CEREBROVASCULAR ACCIDENT): Status: ACTIVE | Noted: 2021-05-03

## 2021-05-03 LAB
HBA1C MFR BLD: 5.7 % (ref 3.5–5.6)
MAGNESIUM SERPL-MCNC: 1.9 MG/DL (ref 1.6–2.4)
POTASSIUM SERPL-SCNC: 4.6 MMOL/L (ref 3.5–5.2)

## 2021-05-03 PROCEDURE — 99233 SBSQ HOSP IP/OBS HIGH 50: CPT | Performed by: INTERNAL MEDICINE

## 2021-05-03 PROCEDURE — 94799 UNLISTED PULMONARY SVC/PX: CPT

## 2021-05-03 PROCEDURE — 63710000001 METHYLPREDNISOLONE PER 4 MG: Performed by: PSYCHIATRY & NEUROLOGY

## 2021-05-03 PROCEDURE — 25010000002 MORPHINE PER 10 MG: Performed by: INTERNAL MEDICINE

## 2021-05-03 PROCEDURE — 92523 SPEECH SOUND LANG COMPREHEN: CPT

## 2021-05-03 PROCEDURE — 83735 ASSAY OF MAGNESIUM: CPT | Performed by: PHYSICIAN ASSISTANT

## 2021-05-03 PROCEDURE — 97167 OT EVAL HIGH COMPLEX 60 MIN: CPT

## 2021-05-03 PROCEDURE — 63710000001 METHYLPREDNISOLONE PER 4 MG: Performed by: INTERNAL MEDICINE

## 2021-05-03 PROCEDURE — 84132 ASSAY OF SERUM POTASSIUM: CPT | Performed by: PHYSICIAN ASSISTANT

## 2021-05-03 PROCEDURE — 25010000002 CEFTRIAXONE PER 250 MG: Performed by: INTERNAL MEDICINE

## 2021-05-03 PROCEDURE — 99232 SBSQ HOSP IP/OBS MODERATE 35: CPT | Performed by: PHYSICIAN ASSISTANT

## 2021-05-03 PROCEDURE — 25010000002 ONDANSETRON PER 1 MG: Performed by: PHYSICIAN ASSISTANT

## 2021-05-03 PROCEDURE — 92526 ORAL FUNCTION THERAPY: CPT

## 2021-05-03 RX ORDER — RISPERIDONE 0.25 MG/1
0.5 TABLET ORAL 2 TIMES DAILY
Status: DISCONTINUED | OUTPATIENT
Start: 2021-05-03 | End: 2021-05-04 | Stop reason: HOSPADM

## 2021-05-03 RX ADMIN — Medication 10 ML: at 20:47

## 2021-05-03 RX ADMIN — IPRATROPIUM BROMIDE AND ALBUTEROL SULFATE 3 ML: 2.5; .5 SOLUTION RESPIRATORY (INHALATION) at 07:09

## 2021-05-03 RX ADMIN — METHYLPREDNISOLONE 8 MG: 4 TABLET ORAL at 20:46

## 2021-05-03 RX ADMIN — ONDANSETRON 4 MG: 2 INJECTION INTRAMUSCULAR; INTRAVENOUS at 09:28

## 2021-05-03 RX ADMIN — BUTALBITAL, ACETAMINOPHEN AND CAFFEINE 2 TABLET: 50; 325; 40 TABLET ORAL at 04:28

## 2021-05-03 RX ADMIN — ROSUVASTATIN CALCIUM 40 MG: 10 TABLET, FILM COATED ORAL at 20:46

## 2021-05-03 RX ADMIN — METHYLPREDNISOLONE 4 MG: 4 TABLET ORAL at 14:38

## 2021-05-03 RX ADMIN — Medication 10 ML: at 10:22

## 2021-05-03 RX ADMIN — METHYLPREDNISOLONE 8 MG: 4 TABLET ORAL at 09:22

## 2021-05-03 RX ADMIN — MORPHINE SULFATE 2 MG: 4 INJECTION INTRAVENOUS at 06:00

## 2021-05-03 RX ADMIN — VERAPAMIL HYDROCHLORIDE 120 MG: 120 TABLET, FILM COATED, EXTENDED RELEASE ORAL at 20:46

## 2021-05-03 RX ADMIN — RISPERIDONE 0.25 MG: 0.25 TABLET ORAL at 09:21

## 2021-05-03 RX ADMIN — GABAPENTIN 800 MG: 400 CAPSULE ORAL at 14:38

## 2021-05-03 RX ADMIN — ACETAMINOPHEN AND CODEINE PHOSPHATE 1 TABLET: 300; 30 TABLET ORAL at 09:21

## 2021-05-03 RX ADMIN — BUSPIRONE HYDROCHLORIDE 10 MG: 10 TABLET ORAL at 20:46

## 2021-05-03 RX ADMIN — BUSPIRONE HYDROCHLORIDE 10 MG: 10 TABLET ORAL at 16:51

## 2021-05-03 RX ADMIN — IPRATROPIUM BROMIDE AND ALBUTEROL SULFATE 3 ML: 2.5; .5 SOLUTION RESPIRATORY (INHALATION) at 23:20

## 2021-05-03 RX ADMIN — ONDANSETRON 4 MG: 2 INJECTION INTRAMUSCULAR; INTRAVENOUS at 20:47

## 2021-05-03 RX ADMIN — LORAZEPAM 1 MG: 1 TABLET ORAL at 11:29

## 2021-05-03 RX ADMIN — RISPERIDONE 0.5 MG: 0.25 TABLET ORAL at 20:46

## 2021-05-03 RX ADMIN — Medication 1 PATCH: at 10:20

## 2021-05-03 RX ADMIN — IPRATROPIUM BROMIDE AND ALBUTEROL SULFATE 3 ML: 2.5; .5 SOLUTION RESPIRATORY (INHALATION) at 11:02

## 2021-05-03 RX ADMIN — METHYLPREDNISOLONE 4 MG: 4 TABLET ORAL at 09:22

## 2021-05-03 RX ADMIN — BUSPIRONE HYDROCHLORIDE 10 MG: 10 TABLET ORAL at 09:21

## 2021-05-03 RX ADMIN — MORPHINE SULFATE 2 MG: 4 INJECTION INTRAVENOUS at 10:20

## 2021-05-03 RX ADMIN — LORAZEPAM 1 MG: 1 TABLET ORAL at 16:51

## 2021-05-03 RX ADMIN — CEFTRIAXONE SODIUM 1 G: 1 INJECTION, POWDER, FOR SOLUTION INTRAMUSCULAR; INTRAVENOUS at 18:04

## 2021-05-03 RX ADMIN — CLOPIDOGREL BISULFATE 75 MG: 75 TABLET ORAL at 09:21

## 2021-05-03 RX ADMIN — GABAPENTIN 800 MG: 400 CAPSULE ORAL at 21:38

## 2021-05-03 RX ADMIN — ACETAMINOPHEN AND CODEINE PHOSPHATE 1 TABLET: 300; 30 TABLET ORAL at 20:45

## 2021-05-03 RX ADMIN — BUTALBITAL, ACETAMINOPHEN AND CAFFEINE 2 TABLET: 50; 325; 40 TABLET ORAL at 13:49

## 2021-05-03 RX ADMIN — METHYLPREDNISOLONE 4 MG: 4 TABLET ORAL at 18:05

## 2021-05-03 RX ADMIN — GABAPENTIN 800 MG: 400 CAPSULE ORAL at 06:00

## 2021-05-03 RX ADMIN — TOPIRAMATE 50 MG: 25 TABLET, FILM COATED ORAL at 20:46

## 2021-05-03 RX ADMIN — LORAZEPAM 1 MG: 1 TABLET ORAL at 04:28

## 2021-05-04 VITALS
SYSTOLIC BLOOD PRESSURE: 132 MMHG | BODY MASS INDEX: 24.59 KG/M2 | WEIGHT: 166.5 LBS | OXYGEN SATURATION: 96 % | DIASTOLIC BLOOD PRESSURE: 82 MMHG | HEART RATE: 92 BPM | TEMPERATURE: 98 F | RESPIRATION RATE: 16 BRPM

## 2021-05-04 LAB
ANION GAP SERPL CALCULATED.3IONS-SCNC: 12 MMOL/L (ref 5–15)
BACTERIA SPEC AEROBE CULT: ABNORMAL
BASOPHILS # BLD AUTO: 0 10*3/MM3 (ref 0–0.2)
BASOPHILS NFR BLD AUTO: 0.2 % (ref 0–1.5)
BUN SERPL-MCNC: 20 MG/DL (ref 8–23)
BUN/CREAT SERPL: 22.5 (ref 7–25)
CALCIUM SPEC-SCNC: 8.8 MG/DL (ref 8.6–10.5)
CHLORIDE SERPL-SCNC: 105 MMOL/L (ref 98–107)
CO2 SERPL-SCNC: 21 MMOL/L (ref 22–29)
CREAT SERPL-MCNC: 0.89 MG/DL (ref 0.57–1)
DEPRECATED RDW RBC AUTO: 56 FL (ref 37–54)
EOSINOPHIL # BLD AUTO: 0 10*3/MM3 (ref 0–0.4)
EOSINOPHIL NFR BLD AUTO: 0.1 % (ref 0.3–6.2)
ERYTHROCYTE [DISTWIDTH] IN BLOOD BY AUTOMATED COUNT: 17.7 % (ref 12.3–15.4)
GFR SERPL CREATININE-BSD FRML MDRD: 65 ML/MIN/1.73
GLUCOSE SERPL-MCNC: 196 MG/DL (ref 65–99)
HCT VFR BLD AUTO: 36.4 % (ref 34–46.6)
HGB BLD-MCNC: 12 G/DL (ref 12–15.9)
LYMPHOCYTES # BLD AUTO: 1.5 10*3/MM3 (ref 0.7–3.1)
LYMPHOCYTES NFR BLD AUTO: 15.6 % (ref 19.6–45.3)
MAGNESIUM SERPL-MCNC: 2.1 MG/DL (ref 1.6–2.4)
MCH RBC QN AUTO: 30.2 PG (ref 26.6–33)
MCHC RBC AUTO-ENTMCNC: 33 G/DL (ref 31.5–35.7)
MCV RBC AUTO: 91.5 FL (ref 79–97)
MONOCYTES # BLD AUTO: 0.7 10*3/MM3 (ref 0.1–0.9)
MONOCYTES NFR BLD AUTO: 7 % (ref 5–12)
NEUTROPHILS NFR BLD AUTO: 7.4 10*3/MM3 (ref 1.7–7)
NEUTROPHILS NFR BLD AUTO: 77.1 % (ref 42.7–76)
NRBC BLD AUTO-RTO: 0.1 /100 WBC (ref 0–0.2)
PLATELET # BLD AUTO: 303 10*3/MM3 (ref 140–450)
PMV BLD AUTO: 8.9 FL (ref 6–12)
POTASSIUM SERPL-SCNC: 4.3 MMOL/L (ref 3.5–5.2)
POTASSIUM SERPL-SCNC: 4.6 MMOL/L (ref 3.5–5.2)
RBC # BLD AUTO: 3.98 10*6/MM3 (ref 3.77–5.28)
SODIUM SERPL-SCNC: 138 MMOL/L (ref 136–145)
WBC # BLD AUTO: 9.5 10*3/MM3 (ref 3.4–10.8)

## 2021-05-04 PROCEDURE — 84132 ASSAY OF SERUM POTASSIUM: CPT | Performed by: PHYSICIAN ASSISTANT

## 2021-05-04 PROCEDURE — 80048 BASIC METABOLIC PNL TOTAL CA: CPT | Performed by: NURSE PRACTITIONER

## 2021-05-04 PROCEDURE — 63710000001 METHYLPREDNISOLONE PER 4 MG: Performed by: PSYCHIATRY & NEUROLOGY

## 2021-05-04 PROCEDURE — 85025 COMPLETE CBC W/AUTO DIFF WBC: CPT | Performed by: NURSE PRACTITIONER

## 2021-05-04 PROCEDURE — 25010000002 HYDROMORPHONE PER 4 MG: Performed by: INTERNAL MEDICINE

## 2021-05-04 PROCEDURE — 25010000002 ONDANSETRON PER 1 MG: Performed by: PHYSICIAN ASSISTANT

## 2021-05-04 PROCEDURE — 97112 NEUROMUSCULAR REEDUCATION: CPT

## 2021-05-04 PROCEDURE — 25010000002 MORPHINE PER 10 MG: Performed by: INTERNAL MEDICINE

## 2021-05-04 PROCEDURE — 92526 ORAL FUNCTION THERAPY: CPT

## 2021-05-04 PROCEDURE — 94799 UNLISTED PULMONARY SVC/PX: CPT

## 2021-05-04 PROCEDURE — 99239 HOSP IP/OBS DSCHRG MGMT >30: CPT | Performed by: INTERNAL MEDICINE

## 2021-05-04 PROCEDURE — 97535 SELF CARE MNGMENT TRAINING: CPT

## 2021-05-04 PROCEDURE — 83735 ASSAY OF MAGNESIUM: CPT | Performed by: PHYSICIAN ASSISTANT

## 2021-05-04 PROCEDURE — 97116 GAIT TRAINING THERAPY: CPT

## 2021-05-04 PROCEDURE — 99231 SBSQ HOSP IP/OBS SF/LOW 25: CPT | Performed by: PHYSICIAN ASSISTANT

## 2021-05-04 RX ORDER — HYDROMORPHONE HCL 110MG/55ML
1 PATIENT CONTROLLED ANALGESIA SYRINGE INTRAVENOUS ONCE
Status: COMPLETED | OUTPATIENT
Start: 2021-05-04 | End: 2021-05-04

## 2021-05-04 RX ORDER — NICOTINE 21 MG/24HR
1 PATCH, TRANSDERMAL 24 HOURS TRANSDERMAL
Qty: 70 PATCH | Refills: 2 | Status: SHIPPED | OUTPATIENT
Start: 2021-05-05 | End: 2021-06-28

## 2021-05-04 RX ORDER — HYDROMORPHONE HCL 110MG/55ML
0.5 PATIENT CONTROLLED ANALGESIA SYRINGE INTRAVENOUS EVERY 4 HOURS PRN
Status: DISCONTINUED | OUTPATIENT
Start: 2021-05-04 | End: 2021-05-04

## 2021-05-04 RX ORDER — AMOXICILLIN 250 MG/1
500 CAPSULE ORAL EVERY 8 HOURS SCHEDULED
Status: DISCONTINUED | OUTPATIENT
Start: 2021-05-04 | End: 2021-05-04 | Stop reason: HOSPADM

## 2021-05-04 RX ORDER — METHYLPREDNISOLONE 4 MG/1
TABLET ORAL
Qty: 10 TABLET | Refills: 0 | Status: SHIPPED | OUTPATIENT
Start: 2021-05-04 | End: 2021-06-28

## 2021-05-04 RX ORDER — AMOXICILLIN 250 MG/1
500 CAPSULE ORAL EVERY 12 HOURS SCHEDULED
Status: DISCONTINUED | OUTPATIENT
Start: 2021-05-04 | End: 2021-05-04

## 2021-05-04 RX ORDER — TETRACYCLINE HYDROCHLORIDE 500 MG/1
500 CAPSULE ORAL 3 TIMES DAILY
Qty: 21 CAPSULE | Refills: 0 | Status: SHIPPED | OUTPATIENT
Start: 2021-05-04 | End: 2021-05-11

## 2021-05-04 RX ADMIN — METHYLPREDNISOLONE 4 MG: 4 TABLET ORAL at 13:30

## 2021-05-04 RX ADMIN — GABAPENTIN 800 MG: 400 CAPSULE ORAL at 05:57

## 2021-05-04 RX ADMIN — CLOPIDOGREL BISULFATE 75 MG: 75 TABLET ORAL at 08:00

## 2021-05-04 RX ADMIN — GABAPENTIN 800 MG: 400 CAPSULE ORAL at 13:30

## 2021-05-04 RX ADMIN — BUSPIRONE HYDROCHLORIDE 10 MG: 10 TABLET ORAL at 17:30

## 2021-05-04 RX ADMIN — BUTALBITAL, ACETAMINOPHEN AND CAFFEINE 2 TABLET: 50; 325; 40 TABLET ORAL at 05:57

## 2021-05-04 RX ADMIN — BUSPIRONE HYDROCHLORIDE 10 MG: 10 TABLET ORAL at 08:00

## 2021-05-04 RX ADMIN — RISPERIDONE 0.5 MG: 0.25 TABLET ORAL at 08:00

## 2021-05-04 RX ADMIN — METHYLPREDNISOLONE 4 MG: 4 TABLET ORAL at 17:31

## 2021-05-04 RX ADMIN — HYDROMORPHONE HYDROCHLORIDE 1 MG: 2 INJECTION, SOLUTION INTRAMUSCULAR; INTRAVENOUS; SUBCUTANEOUS at 09:32

## 2021-05-04 RX ADMIN — MORPHINE SULFATE 2 MG: 4 INJECTION INTRAVENOUS at 00:20

## 2021-05-04 RX ADMIN — Medication 1 PATCH: at 08:01

## 2021-05-04 RX ADMIN — AMOXICILLIN 500 MG: 250 CAPSULE ORAL at 13:30

## 2021-05-04 RX ADMIN — METHYLPREDNISOLONE 4 MG: 4 TABLET ORAL at 07:54

## 2021-05-04 RX ADMIN — MORPHINE SULFATE 2 MG: 4 INJECTION INTRAVENOUS at 07:55

## 2021-05-04 RX ADMIN — ONDANSETRON 4 MG: 2 INJECTION INTRAMUSCULAR; INTRAVENOUS at 13:39

## 2021-05-04 RX ADMIN — ACETAMINOPHEN AND CODEINE PHOSPHATE 1 TABLET: 300; 30 TABLET ORAL at 17:30

## 2021-05-04 RX ADMIN — MORPHINE SULFATE 2 MG: 4 INJECTION INTRAVENOUS at 13:30

## 2021-05-04 RX ADMIN — IPRATROPIUM BROMIDE AND ALBUTEROL SULFATE 3 ML: 2.5; .5 SOLUTION RESPIRATORY (INHALATION) at 15:10

## 2021-05-04 RX ADMIN — Medication 10 ML: at 08:01

## 2021-05-07 LAB — BACTERIA SPEC AEROBE CULT: NORMAL

## 2021-05-24 LAB
Lab: 1
TOAL ENROLLMENT DAYS: 30

## 2021-05-24 PROCEDURE — 93272 ECG/REVIEW INTERPRET ONLY: CPT | Performed by: INTERNAL MEDICINE

## 2021-06-27 ENCOUNTER — HOSPITAL ENCOUNTER (OUTPATIENT)
Facility: HOSPITAL | Age: 61
Setting detail: OBSERVATION
Discharge: HOME | End: 2021-06-28
Attending: EMERGENCY MEDICINE | Admitting: INTERNAL MEDICINE

## 2021-06-27 ENCOUNTER — APPOINTMENT (OUTPATIENT)
Dept: CT IMAGING | Facility: HOSPITAL | Age: 61
End: 2021-06-27

## 2021-06-27 ENCOUNTER — APPOINTMENT (OUTPATIENT)
Dept: GENERAL RADIOLOGY | Facility: HOSPITAL | Age: 61
End: 2021-06-27

## 2021-06-27 DIAGNOSIS — M79.642 LEFT HAND PAIN: ICD-10-CM

## 2021-06-27 DIAGNOSIS — I63.9 CEREBROVASCULAR ACCIDENT (CVA), UNSPECIFIED MECHANISM (HCC): Primary | ICD-10-CM

## 2021-06-27 DIAGNOSIS — R03.0 ELEVATED BLOOD PRESSURE READING: ICD-10-CM

## 2021-06-27 LAB
ANION GAP SERPL CALCULATED.3IONS-SCNC: 10 MMOL/L (ref 5–15)
APTT PPP: 29 SECONDS (ref 24–31)
BASOPHILS # BLD AUTO: 0 10*3/MM3 (ref 0–0.2)
BASOPHILS NFR BLD AUTO: 0.5 % (ref 0–1.5)
BUN SERPL-MCNC: 10 MG/DL (ref 8–23)
BUN/CREAT SERPL: 9.9 (ref 7–25)
CALCIUM SPEC-SCNC: 8.5 MG/DL (ref 8.6–10.5)
CHLORIDE SERPL-SCNC: 104 MMOL/L (ref 98–107)
CO2 SERPL-SCNC: 25 MMOL/L (ref 22–29)
CREAT SERPL-MCNC: 1.01 MG/DL (ref 0.57–1)
DEPRECATED RDW RBC AUTO: 49.4 FL (ref 37–54)
EOSINOPHIL # BLD AUTO: 0.1 10*3/MM3 (ref 0–0.4)
EOSINOPHIL NFR BLD AUTO: 0.7 % (ref 0.3–6.2)
ERYTHROCYTE [DISTWIDTH] IN BLOOD BY AUTOMATED COUNT: 15.2 % (ref 12.3–15.4)
GFR SERPL CREATININE-BSD FRML MDRD: 56 ML/MIN/1.73
GLUCOSE BLDC GLUCOMTR-MCNC: 103 MG/DL (ref 70–105)
GLUCOSE SERPL-MCNC: 97 MG/DL (ref 65–99)
HCT VFR BLD AUTO: 35.3 % (ref 34–46.6)
HGB BLD-MCNC: 11.9 G/DL (ref 12–15.9)
INR PPP: 1.03 (ref 0.93–1.1)
LYMPHOCYTES # BLD AUTO: 2.8 10*3/MM3 (ref 0.7–3.1)
LYMPHOCYTES NFR BLD AUTO: 29.3 % (ref 19.6–45.3)
MCH RBC QN AUTO: 30.7 PG (ref 26.6–33)
MCHC RBC AUTO-ENTMCNC: 33.6 G/DL (ref 31.5–35.7)
MCV RBC AUTO: 91.6 FL (ref 79–97)
MONOCYTES # BLD AUTO: 1.1 10*3/MM3 (ref 0.1–0.9)
MONOCYTES NFR BLD AUTO: 11.7 % (ref 5–12)
NEUTROPHILS NFR BLD AUTO: 5.6 10*3/MM3 (ref 1.7–7)
NEUTROPHILS NFR BLD AUTO: 57.8 % (ref 42.7–76)
NRBC BLD AUTO-RTO: 0.1 /100 WBC (ref 0–0.2)
PLATELET # BLD AUTO: 292 10*3/MM3 (ref 140–450)
PMV BLD AUTO: 8.8 FL (ref 6–12)
POTASSIUM SERPL-SCNC: 4.1 MMOL/L (ref 3.5–5.2)
PROTHROMBIN TIME: 11.4 SECONDS (ref 9.6–11.7)
RBC # BLD AUTO: 3.86 10*6/MM3 (ref 3.77–5.28)
SODIUM SERPL-SCNC: 139 MMOL/L (ref 136–145)
TROPONIN T SERPL-MCNC: <0.01 NG/ML (ref 0–0.03)
WBC # BLD AUTO: 9.6 10*3/MM3 (ref 3.4–10.8)

## 2021-06-27 PROCEDURE — 99285 EMERGENCY DEPT VISIT HI MDM: CPT

## 2021-06-27 PROCEDURE — 82962 GLUCOSE BLOOD TEST: CPT

## 2021-06-27 PROCEDURE — 84443 ASSAY THYROID STIM HORMONE: CPT | Performed by: NURSE PRACTITIONER

## 2021-06-27 PROCEDURE — 85610 PROTHROMBIN TIME: CPT | Performed by: EMERGENCY MEDICINE

## 2021-06-27 PROCEDURE — 36415 COLL VENOUS BLD VENIPUNCTURE: CPT

## 2021-06-27 PROCEDURE — 71045 X-RAY EXAM CHEST 1 VIEW: CPT

## 2021-06-27 PROCEDURE — 80048 BASIC METABOLIC PNL TOTAL CA: CPT | Performed by: EMERGENCY MEDICINE

## 2021-06-27 PROCEDURE — 85730 THROMBOPLASTIN TIME PARTIAL: CPT | Performed by: EMERGENCY MEDICINE

## 2021-06-27 PROCEDURE — 70450 CT HEAD/BRAIN W/O DYE: CPT

## 2021-06-27 PROCEDURE — 85025 COMPLETE CBC W/AUTO DIFF WBC: CPT | Performed by: EMERGENCY MEDICINE

## 2021-06-27 PROCEDURE — 84484 ASSAY OF TROPONIN QUANT: CPT | Performed by: EMERGENCY MEDICINE

## 2021-06-27 PROCEDURE — 93005 ELECTROCARDIOGRAM TRACING: CPT | Performed by: EMERGENCY MEDICINE

## 2021-06-27 PROCEDURE — 82607 VITAMIN B-12: CPT | Performed by: NURSE PRACTITIONER

## 2021-06-27 RX ORDER — ONDANSETRON 2 MG/ML
4 INJECTION INTRAMUSCULAR; INTRAVENOUS ONCE
Status: COMPLETED | OUTPATIENT
Start: 2021-06-27 | End: 2021-06-28

## 2021-06-27 RX ORDER — SODIUM CHLORIDE 0.9 % (FLUSH) 0.9 %
10 SYRINGE (ML) INJECTION AS NEEDED
Status: DISCONTINUED | OUTPATIENT
Start: 2021-06-27 | End: 2021-06-28 | Stop reason: HOSPADM

## 2021-06-27 RX ORDER — MORPHINE SULFATE 4 MG/ML
2 INJECTION, SOLUTION INTRAMUSCULAR; INTRAVENOUS ONCE
Status: COMPLETED | OUTPATIENT
Start: 2021-06-27 | End: 2021-06-28

## 2021-06-28 ENCOUNTER — APPOINTMENT (OUTPATIENT)
Dept: MRI IMAGING | Facility: HOSPITAL | Age: 61
End: 2021-06-28

## 2021-06-28 ENCOUNTER — APPOINTMENT (OUTPATIENT)
Dept: CARDIOLOGY | Facility: HOSPITAL | Age: 61
End: 2021-06-28

## 2021-06-28 VITALS
RESPIRATION RATE: 16 BRPM | TEMPERATURE: 97.9 F | WEIGHT: 168 LBS | BODY MASS INDEX: 24.88 KG/M2 | OXYGEN SATURATION: 99 % | DIASTOLIC BLOOD PRESSURE: 60 MMHG | SYSTOLIC BLOOD PRESSURE: 107 MMHG | HEART RATE: 53 BPM | HEIGHT: 69 IN

## 2021-06-28 PROBLEM — R09.89 SUSPECTED CEREBROVASCULAR ACCIDENT (CVA): Status: RESOLVED | Noted: 2021-06-28 | Resolved: 2021-06-28

## 2021-06-28 PROBLEM — R41.841 COGNITIVE COMMUNICATION DEFICIT: Status: ACTIVE | Noted: 2021-05-04

## 2021-06-28 PROBLEM — E78.5 HYPERLIPIDEMIA, UNSPECIFIED: Status: ACTIVE | Noted: 2021-05-04

## 2021-06-28 PROBLEM — I69.30 HISTORY OF CEREBROVASCULAR ACCIDENT (CVA) WITH RESIDUAL DEFICIT: Chronic | Status: ACTIVE | Noted: 2021-06-28

## 2021-06-28 PROBLEM — I25.10 ATHEROSCLEROTIC HEART DISEASE OF NATIVE CORONARY ARTERY WITHOUT ANGINA PECTORIS: Status: ACTIVE | Noted: 2021-05-04

## 2021-06-28 PROBLEM — R09.89 SUSPECTED CEREBROVASCULAR ACCIDENT (CVA): Status: ACTIVE | Noted: 2021-06-28

## 2021-06-28 PROBLEM — I69.991 DYSPHAGIA FOLLOWING UNSPECIFIED CEREBROVASCULAR DISEASE: Status: ACTIVE | Noted: 2021-05-04

## 2021-06-28 LAB
ALBUMIN SERPL-MCNC: 3.6 G/DL (ref 3.5–5.2)
ALBUMIN/GLOB SERPL: 1.1 G/DL
ALP SERPL-CCNC: 73 U/L (ref 39–117)
ALT SERPL W P-5'-P-CCNC: 8 U/L (ref 1–33)
ANION GAP SERPL CALCULATED.3IONS-SCNC: 10 MMOL/L (ref 5–15)
AST SERPL-CCNC: 14 U/L (ref 1–32)
BASOPHILS # BLD AUTO: 0 10*3/MM3 (ref 0–0.2)
BASOPHILS NFR BLD AUTO: 0.5 % (ref 0–1.5)
BILIRUB SERPL-MCNC: 0.3 MG/DL (ref 0–1.2)
BUN SERPL-MCNC: 9 MG/DL (ref 8–23)
BUN/CREAT SERPL: 10.2 (ref 7–25)
CALCIUM SPEC-SCNC: 8.1 MG/DL (ref 8.6–10.5)
CHLORIDE SERPL-SCNC: 104 MMOL/L (ref 98–107)
CHOLEST SERPL-MCNC: 82 MG/DL (ref 0–200)
CO2 SERPL-SCNC: 27 MMOL/L (ref 22–29)
CREAT SERPL-MCNC: 0.88 MG/DL (ref 0.57–1)
DEPRECATED RDW RBC AUTO: 49.9 FL (ref 37–54)
EOSINOPHIL # BLD AUTO: 0.1 10*3/MM3 (ref 0–0.4)
EOSINOPHIL NFR BLD AUTO: 0.9 % (ref 0.3–6.2)
ERYTHROCYTE [DISTWIDTH] IN BLOOD BY AUTOMATED COUNT: 15.4 % (ref 12.3–15.4)
ETHANOL UR QL: <0.01 %
GFR SERPL CREATININE-BSD FRML MDRD: 66 ML/MIN/1.73
GLOBULIN UR ELPH-MCNC: 3.2 GM/DL
GLUCOSE BLDC GLUCOMTR-MCNC: 99 MG/DL (ref 70–105)
GLUCOSE SERPL-MCNC: 89 MG/DL (ref 65–99)
HBA1C MFR BLD: 5.4 % (ref 3.5–5.6)
HCT VFR BLD AUTO: 37 % (ref 34–46.6)
HDLC SERPL-MCNC: 31 MG/DL (ref 40–60)
HGB BLD-MCNC: 12.3 G/DL (ref 12–15.9)
HOLD SPECIMEN: NORMAL
LDLC SERPL CALC-MCNC: 26 MG/DL (ref 0–100)
LDLC/HDLC SERPL: 0.73 {RATIO}
LYMPHOCYTES # BLD AUTO: 3.1 10*3/MM3 (ref 0.7–3.1)
LYMPHOCYTES NFR BLD AUTO: 31.8 % (ref 19.6–45.3)
MCH RBC QN AUTO: 30.3 PG (ref 26.6–33)
MCHC RBC AUTO-ENTMCNC: 33.3 G/DL (ref 31.5–35.7)
MCV RBC AUTO: 91 FL (ref 79–97)
MONOCYTES # BLD AUTO: 1.1 10*3/MM3 (ref 0.1–0.9)
MONOCYTES NFR BLD AUTO: 10.6 % (ref 5–12)
NEUTROPHILS NFR BLD AUTO: 5.6 10*3/MM3 (ref 1.7–7)
NEUTROPHILS NFR BLD AUTO: 56.2 % (ref 42.7–76)
NRBC BLD AUTO-RTO: 0.2 /100 WBC (ref 0–0.2)
PLATELET # BLD AUTO: 317 10*3/MM3 (ref 140–450)
PMV BLD AUTO: 8.8 FL (ref 6–12)
POTASSIUM SERPL-SCNC: 3.7 MMOL/L (ref 3.5–5.2)
PROT SERPL-MCNC: 6.8 G/DL (ref 6–8.5)
RBC # BLD AUTO: 4.07 10*6/MM3 (ref 3.77–5.28)
SARS-COV-2 ORF1AB RESP QL NAA+PROBE: NOT DETECTED
SODIUM SERPL-SCNC: 141 MMOL/L (ref 136–145)
TRIGL SERPL-MCNC: 142 MG/DL (ref 0–150)
TSH SERPL DL<=0.05 MIU/L-ACNC: 1.81 UIU/ML (ref 0.27–4.2)
VIT B12 BLD-MCNC: 286 PG/ML (ref 211–946)
VLDLC SERPL-MCNC: 25 MG/DL (ref 5–40)
WBC # BLD AUTO: 9.9 10*3/MM3 (ref 3.4–10.8)

## 2021-06-28 PROCEDURE — 25010000002 ONDANSETRON PER 1 MG: Performed by: NURSE PRACTITIONER

## 2021-06-28 PROCEDURE — 99214 OFFICE O/P EST MOD 30 MIN: CPT | Performed by: NURSE PRACTITIONER

## 2021-06-28 PROCEDURE — 93306 TTE W/DOPPLER COMPLETE: CPT

## 2021-06-28 PROCEDURE — G0480 DRUG TEST DEF 1-7 CLASSES: HCPCS | Performed by: NURSE PRACTITIONER

## 2021-06-28 PROCEDURE — U0004 COV-19 TEST NON-CDC HGH THRU: HCPCS | Performed by: EMERGENCY MEDICINE

## 2021-06-28 PROCEDURE — 82077 ASSAY SPEC XCP UR&BREATH IA: CPT | Performed by: NURSE PRACTITIONER

## 2021-06-28 PROCEDURE — 80053 COMPREHEN METABOLIC PANEL: CPT | Performed by: NURSE PRACTITIONER

## 2021-06-28 PROCEDURE — 36415 COLL VENOUS BLD VENIPUNCTURE: CPT | Performed by: NURSE PRACTITIONER

## 2021-06-28 PROCEDURE — 83036 HEMOGLOBIN GLYCOSYLATED A1C: CPT | Performed by: NURSE PRACTITIONER

## 2021-06-28 PROCEDURE — G0378 HOSPITAL OBSERVATION PER HR: HCPCS

## 2021-06-28 PROCEDURE — 96376 TX/PRO/DX INJ SAME DRUG ADON: CPT

## 2021-06-28 PROCEDURE — 99217 PR OBSERVATION CARE DISCHARGE MANAGEMENT: CPT | Performed by: INTERNAL MEDICINE

## 2021-06-28 PROCEDURE — 80184 ASSAY OF PHENOBARBITAL: CPT | Performed by: NURSE PRACTITIONER

## 2021-06-28 PROCEDURE — 80179 DRUG ASSAY SALICYLATE: CPT | Performed by: NURSE PRACTITIONER

## 2021-06-28 PROCEDURE — 97161 PT EVAL LOW COMPLEX 20 MIN: CPT

## 2021-06-28 PROCEDURE — 25010000002 ONDANSETRON PER 1 MG: Performed by: EMERGENCY MEDICINE

## 2021-06-28 PROCEDURE — C9803 HOPD COVID-19 SPEC COLLECT: HCPCS

## 2021-06-28 PROCEDURE — 82962 GLUCOSE BLOOD TEST: CPT

## 2021-06-28 PROCEDURE — 92610 EVALUATE SWALLOWING FUNCTION: CPT

## 2021-06-28 PROCEDURE — U0005 INFEC AGEN DETEC AMPLI PROBE: HCPCS | Performed by: EMERGENCY MEDICINE

## 2021-06-28 PROCEDURE — 96375 TX/PRO/DX INJ NEW DRUG ADDON: CPT

## 2021-06-28 PROCEDURE — 70551 MRI BRAIN STEM W/O DYE: CPT

## 2021-06-28 PROCEDURE — 80061 LIPID PANEL: CPT | Performed by: NURSE PRACTITIONER

## 2021-06-28 PROCEDURE — 96374 THER/PROPH/DIAG INJ IV PUSH: CPT

## 2021-06-28 PROCEDURE — 85025 COMPLETE CBC W/AUTO DIFF WBC: CPT | Performed by: NURSE PRACTITIONER

## 2021-06-28 PROCEDURE — 94640 AIRWAY INHALATION TREATMENT: CPT

## 2021-06-28 PROCEDURE — 97165 OT EVAL LOW COMPLEX 30 MIN: CPT

## 2021-06-28 PROCEDURE — 93306 TTE W/DOPPLER COMPLETE: CPT | Performed by: INTERNAL MEDICINE

## 2021-06-28 PROCEDURE — 25010000002 MORPHINE PER 10 MG: Performed by: EMERGENCY MEDICINE

## 2021-06-28 PROCEDURE — 94799 UNLISTED PULMONARY SVC/PX: CPT

## 2021-06-28 RX ORDER — CHOLECALCIFEROL (VITAMIN D3) 125 MCG
5 CAPSULE ORAL NIGHTLY PRN
Status: DISCONTINUED | OUTPATIENT
Start: 2021-06-28 | End: 2021-06-28 | Stop reason: HOSPADM

## 2021-06-28 RX ORDER — ALBUTEROL SULFATE 2.5 MG/3ML
2.5 SOLUTION RESPIRATORY (INHALATION) EVERY 4 HOURS PRN
Status: DISCONTINUED | OUTPATIENT
Start: 2021-06-28 | End: 2021-06-28 | Stop reason: HOSPADM

## 2021-06-28 RX ORDER — BUTALBITAL/ACETAMINOPHEN 50MG-325MG
TABLET ORAL EVERY 4 HOURS PRN
COMMUNITY
End: 2021-08-02

## 2021-06-28 RX ORDER — ACETAMINOPHEN 500 MG
1000 TABLET ORAL 3 TIMES DAILY
Status: DISCONTINUED | OUTPATIENT
Start: 2021-06-28 | End: 2021-06-28 | Stop reason: HOSPADM

## 2021-06-28 RX ORDER — LABETALOL HYDROCHLORIDE 5 MG/ML
20 INJECTION, SOLUTION INTRAVENOUS EVERY 6 HOURS PRN
Status: DISCONTINUED | OUTPATIENT
Start: 2021-06-28 | End: 2021-06-28 | Stop reason: HOSPADM

## 2021-06-28 RX ORDER — SODIUM CHLORIDE 0.9 % (FLUSH) 0.9 %
10 SYRINGE (ML) INJECTION EVERY 12 HOURS SCHEDULED
Status: DISCONTINUED | OUTPATIENT
Start: 2021-06-28 | End: 2021-06-28 | Stop reason: HOSPADM

## 2021-06-28 RX ORDER — ONDANSETRON 2 MG/ML
4 INJECTION INTRAMUSCULAR; INTRAVENOUS EVERY 6 HOURS PRN
Status: DISCONTINUED | OUTPATIENT
Start: 2021-06-28 | End: 2021-06-28 | Stop reason: HOSPADM

## 2021-06-28 RX ORDER — ONDANSETRON 4 MG/1
4 TABLET, FILM COATED ORAL EVERY 6 HOURS PRN
Status: DISCONTINUED | OUTPATIENT
Start: 2021-06-28 | End: 2021-06-28 | Stop reason: HOSPADM

## 2021-06-28 RX ORDER — SODIUM CHLORIDE 0.9 % (FLUSH) 0.9 %
10 SYRINGE (ML) INJECTION AS NEEDED
Status: DISCONTINUED | OUTPATIENT
Start: 2021-06-28 | End: 2021-06-28 | Stop reason: HOSPADM

## 2021-06-28 RX ORDER — ATORVASTATIN CALCIUM 80 MG/1
80 TABLET, FILM COATED ORAL NIGHTLY
COMMUNITY

## 2021-06-28 RX ORDER — ACETAMINOPHEN 160 MG/5ML
650 SOLUTION ORAL EVERY 4 HOURS PRN
Status: DISCONTINUED | OUTPATIENT
Start: 2021-06-28 | End: 2021-06-28 | Stop reason: HOSPADM

## 2021-06-28 RX ORDER — BUSPIRONE HYDROCHLORIDE 5 MG/1
10 TABLET ORAL 3 TIMES DAILY
Status: DISCONTINUED | OUTPATIENT
Start: 2021-06-28 | End: 2021-06-28 | Stop reason: HOSPADM

## 2021-06-28 RX ORDER — ATORVASTATIN CALCIUM 40 MG/1
40 TABLET, FILM COATED ORAL NIGHTLY
Status: DISCONTINUED | OUTPATIENT
Start: 2021-06-28 | End: 2021-06-28 | Stop reason: HOSPADM

## 2021-06-28 RX ORDER — IPRATROPIUM BROMIDE AND ALBUTEROL SULFATE 2.5; .5 MG/3ML; MG/3ML
3 SOLUTION RESPIRATORY (INHALATION)
Status: DISCONTINUED | OUTPATIENT
Start: 2021-06-28 | End: 2021-06-28 | Stop reason: HOSPADM

## 2021-06-28 RX ORDER — MAGNESIUM SULFATE HEPTAHYDRATE 40 MG/ML
2 INJECTION, SOLUTION INTRAVENOUS AS NEEDED
Status: DISCONTINUED | OUTPATIENT
Start: 2021-06-28 | End: 2021-06-28 | Stop reason: HOSPADM

## 2021-06-28 RX ORDER — ACETAMINOPHEN AND CODEINE PHOSPHATE 300; 30 MG/1; MG/1
1 TABLET ORAL 3 TIMES DAILY PRN
Qty: 10 TABLET | Refills: 0 | Status: SHIPPED | OUTPATIENT
Start: 2021-06-28 | End: 2021-08-02

## 2021-06-28 RX ORDER — POTASSIUM CHLORIDE 20 MEQ/1
40 TABLET, EXTENDED RELEASE ORAL AS NEEDED
Status: DISCONTINUED | OUTPATIENT
Start: 2021-06-28 | End: 2021-06-28 | Stop reason: HOSPADM

## 2021-06-28 RX ORDER — ACETAMINOPHEN 650 MG/1
650 SUPPOSITORY RECTAL EVERY 4 HOURS PRN
Status: DISCONTINUED | OUTPATIENT
Start: 2021-06-28 | End: 2021-06-28 | Stop reason: HOSPADM

## 2021-06-28 RX ORDER — ACETAMINOPHEN 325 MG/1
650 TABLET ORAL EVERY 4 HOURS PRN
Status: DISCONTINUED | OUTPATIENT
Start: 2021-06-28 | End: 2021-06-28 | Stop reason: HOSPADM

## 2021-06-28 RX ORDER — NITROGLYCERIN 0.4 MG/1
0.4 TABLET SUBLINGUAL
Status: DISCONTINUED | OUTPATIENT
Start: 2021-06-28 | End: 2021-06-28 | Stop reason: HOSPADM

## 2021-06-28 RX ORDER — CLOPIDOGREL BISULFATE 75 MG/1
75 TABLET ORAL DAILY
Status: DISCONTINUED | OUTPATIENT
Start: 2021-06-28 | End: 2021-06-28 | Stop reason: HOSPADM

## 2021-06-28 RX ORDER — HYDROCODONE BITARTRATE AND ACETAMINOPHEN 5; 325 MG/1; MG/1
1 TABLET ORAL ONCE
Status: COMPLETED | OUTPATIENT
Start: 2021-06-28 | End: 2021-06-28

## 2021-06-28 RX ORDER — MAGNESIUM SULFATE 1 G/100ML
1 INJECTION INTRAVENOUS AS NEEDED
Status: DISCONTINUED | OUTPATIENT
Start: 2021-06-28 | End: 2021-06-28 | Stop reason: HOSPADM

## 2021-06-28 RX ORDER — ALBUTEROL SULFATE 90 UG/1
2 AEROSOL, METERED RESPIRATORY (INHALATION) EVERY 4 HOURS PRN
Status: DISCONTINUED | OUTPATIENT
Start: 2021-06-28 | End: 2021-06-28

## 2021-06-28 RX ORDER — ALUMINA, MAGNESIA, AND SIMETHICONE 2400; 2400; 240 MG/30ML; MG/30ML; MG/30ML
15 SUSPENSION ORAL EVERY 6 HOURS PRN
Status: DISCONTINUED | OUTPATIENT
Start: 2021-06-28 | End: 2021-06-28 | Stop reason: HOSPADM

## 2021-06-28 RX ADMIN — ONDANSETRON 4 MG: 2 INJECTION INTRAMUSCULAR; INTRAVENOUS at 13:39

## 2021-06-28 RX ADMIN — Medication 10 ML: at 11:16

## 2021-06-28 RX ADMIN — VERAPAMIL HYDROCHLORIDE 120 MG: 120 TABLET, FILM COATED, EXTENDED RELEASE ORAL at 20:08

## 2021-06-28 RX ADMIN — BUSPIRONE HYDROCHLORIDE 10 MG: 5 TABLET ORAL at 20:05

## 2021-06-28 RX ADMIN — ACETAMINOPHEN 1000 MG: 500 TABLET, FILM COATED ORAL at 09:35

## 2021-06-28 RX ADMIN — HYDROCODONE BITARTRATE AND ACETAMINOPHEN 1 TABLET: 5; 325 TABLET ORAL at 17:01

## 2021-06-28 RX ADMIN — MORPHINE SULFATE 2 MG: 4 INJECTION INTRAVENOUS at 00:03

## 2021-06-28 RX ADMIN — ACETAMINOPHEN 1000 MG: 500 TABLET, FILM COATED ORAL at 20:05

## 2021-06-28 RX ADMIN — BUSPIRONE HYDROCHLORIDE 10 MG: 5 TABLET ORAL at 15:00

## 2021-06-28 RX ADMIN — CLOPIDOGREL BISULFATE 75 MG: 75 TABLET ORAL at 09:35

## 2021-06-28 RX ADMIN — ATORVASTATIN CALCIUM 40 MG: 40 TABLET, FILM COATED ORAL at 20:05

## 2021-06-28 RX ADMIN — IPRATROPIUM BROMIDE AND ALBUTEROL SULFATE 3 ML: 2.5; .5 SOLUTION RESPIRATORY (INHALATION) at 15:29

## 2021-06-28 RX ADMIN — ACETAMINOPHEN 1000 MG: 500 TABLET, FILM COATED ORAL at 15:00

## 2021-06-28 RX ADMIN — Medication 10 ML: at 20:08

## 2021-06-28 RX ADMIN — ONDANSETRON 4 MG: 2 INJECTION INTRAMUSCULAR; INTRAVENOUS at 00:03

## 2021-06-29 LAB
BH CV ECHO MEAS - ACS: 2 CM
BH CV ECHO MEAS - AO MAX PG (FULL): 1.6 MMHG
BH CV ECHO MEAS - AO MAX PG: 5.4 MMHG
BH CV ECHO MEAS - AO MEAN PG (FULL): 1.2 MMHG
BH CV ECHO MEAS - AO MEAN PG: 3.1 MMHG
BH CV ECHO MEAS - AO ROOT AREA (BSA CORRECTED): 1.6
BH CV ECHO MEAS - AO ROOT AREA: 7.1 CM^2
BH CV ECHO MEAS - AO ROOT DIAM: 3 CM
BH CV ECHO MEAS - AO V2 MAX: 116.1 CM/SEC
BH CV ECHO MEAS - AO V2 MEAN: 83.1 CM/SEC
BH CV ECHO MEAS - AO V2 VTI: 26.9 CM
BH CV ECHO MEAS - AVA(I,A): 2.3 CM^2
BH CV ECHO MEAS - AVA(I,D): 2.3 CM^2
BH CV ECHO MEAS - AVA(V,A): 2.5 CM^2
BH CV ECHO MEAS - AVA(V,D): 2.5 CM^2
BH CV ECHO MEAS - BSA(HAYCOCK): 1.9 M^2
BH CV ECHO MEAS - BSA: 1.9 M^2
BH CV ECHO MEAS - BZI_BMI: 24.8 KILOGRAMS/M^2
BH CV ECHO MEAS - BZI_METRIC_HEIGHT: 175.3 CM
BH CV ECHO MEAS - BZI_METRIC_WEIGHT: 76.2 KG
BH CV ECHO MEAS - EDV(CUBED): 127 ML
BH CV ECHO MEAS - EDV(MOD-SP2): 78.8 ML
BH CV ECHO MEAS - EDV(MOD-SP4): 84.7 ML
BH CV ECHO MEAS - EDV(TEICH): 119.7 ML
BH CV ECHO MEAS - EF(CUBED): 70.4 %
BH CV ECHO MEAS - EF(MOD-BP): 62 %
BH CV ECHO MEAS - EF(MOD-SP2): 70.6 %
BH CV ECHO MEAS - EF(MOD-SP4): 54.6 %
BH CV ECHO MEAS - EF(TEICH): 61.8 %
BH CV ECHO MEAS - ESV(CUBED): 37.6 ML
BH CV ECHO MEAS - ESV(MOD-SP2): 23.2 ML
BH CV ECHO MEAS - ESV(MOD-SP4): 38.4 ML
BH CV ECHO MEAS - ESV(TEICH): 45.8 ML
BH CV ECHO MEAS - FS: 33.4 %
BH CV ECHO MEAS - IVS/LVPW: 1.1
BH CV ECHO MEAS - IVSD: 1.2 CM
BH CV ECHO MEAS - LA DIMENSION(2D): 3 CM
BH CV ECHO MEAS - LV DIASTOLIC VOL/BSA (35-75): 44.2 ML/M^2
BH CV ECHO MEAS - LV MASS(C)D: 222.9 GRAMS
BH CV ECHO MEAS - LV MASS(C)DI: 116.2 GRAMS/M^2
BH CV ECHO MEAS - LV MAX PG: 3.8 MMHG
BH CV ECHO MEAS - LV MEAN PG: 1.8 MMHG
BH CV ECHO MEAS - LV SYSTOLIC VOL/BSA (12-30): 20 ML/M^2
BH CV ECHO MEAS - LV V1 MAX: 96.9 CM/SEC
BH CV ECHO MEAS - LV V1 MEAN: 62.6 CM/SEC
BH CV ECHO MEAS - LV V1 VTI: 21.1 CM
BH CV ECHO MEAS - LVIDD: 5 CM
BH CV ECHO MEAS - LVIDS: 3.4 CM
BH CV ECHO MEAS - LVOT AREA: 3 CM^2
BH CV ECHO MEAS - LVOT DIAM: 1.9 CM
BH CV ECHO MEAS - LVPWD: 1.1 CM
BH CV ECHO MEAS - MR MAX PG: 60.1 MMHG
BH CV ECHO MEAS - MR MAX VEL: 386.8 CM/SEC
BH CV ECHO MEAS - MV A MAX VEL: 48.8 CM/SEC
BH CV ECHO MEAS - MV DEC SLOPE: 355 CM/SEC^2
BH CV ECHO MEAS - MV DEC TIME: 0.23 SEC
BH CV ECHO MEAS - MV E MAX VEL: 83.1 CM/SEC
BH CV ECHO MEAS - MV E/A: 1.7
BH CV ECHO MEAS - MV MAX PG: 3.1 MMHG
BH CV ECHO MEAS - MV MEAN PG: 1.3 MMHG
BH CV ECHO MEAS - MV V2 MAX: 88.5 CM/SEC
BH CV ECHO MEAS - MV V2 MEAN: 53.1 CM/SEC
BH CV ECHO MEAS - MV V2 VTI: 32.6 CM
BH CV ECHO MEAS - MVA(VTI): 1.9 CM^2
BH CV ECHO MEAS - PA MAX PG (FULL): 1.9 MMHG
BH CV ECHO MEAS - PA MAX PG: 3 MMHG
BH CV ECHO MEAS - PA MEAN PG (FULL): 1.1 MMHG
BH CV ECHO MEAS - PA MEAN PG: 1.7 MMHG
BH CV ECHO MEAS - PA V2 MAX: 86.1 CM/SEC
BH CV ECHO MEAS - PA V2 MEAN: 62.2 CM/SEC
BH CV ECHO MEAS - PA V2 VTI: 21 CM
BH CV ECHO MEAS - PULM A REVS DUR: 0.12 SEC
BH CV ECHO MEAS - PULM A REVS VEL: 25.7 CM/SEC
BH CV ECHO MEAS - PULM DIAS VEL: 36.7 CM/SEC
BH CV ECHO MEAS - PULM S/D: 1.3
BH CV ECHO MEAS - PULM SYS VEL: 48 CM/SEC
BH CV ECHO MEAS - RV MAX PG: 1.1 MMHG
BH CV ECHO MEAS - RV MEAN PG: 0.66 MMHG
BH CV ECHO MEAS - RV V1 MAX: 51.4 CM/SEC
BH CV ECHO MEAS - RV V1 MEAN: 38.3 CM/SEC
BH CV ECHO MEAS - RV V1 VTI: 12.3 CM
BH CV ECHO MEAS - SI(AO): 99.4 ML/M^2
BH CV ECHO MEAS - SI(CUBED): 46.6 ML/M^2
BH CV ECHO MEAS - SI(LVOT): 32.5 ML/M^2
BH CV ECHO MEAS - SI(MOD-SP2): 29 ML/M^2
BH CV ECHO MEAS - SI(MOD-SP4): 24.1 ML/M^2
BH CV ECHO MEAS - SI(TEICH): 38.5 ML/M^2
BH CV ECHO MEAS - SV(AO): 190.6 ML
BH CV ECHO MEAS - SV(CUBED): 89.4 ML
BH CV ECHO MEAS - SV(LVOT): 62.4 ML
BH CV ECHO MEAS - SV(MOD-SP2): 55.6 ML
BH CV ECHO MEAS - SV(MOD-SP4): 46.2 ML
BH CV ECHO MEAS - SV(TEICH): 73.9 ML
BH CV ECHO MEAS - TR MAX VEL: 198 CM/SEC
QT INTERVAL: 371 MS

## 2021-07-08 LAB
ACETONE SERPL-MCNC: <.01 G/DL (ref 0–0.01)
BUTALBITAL SERPL-MCNC: 3 UG/ML (ref 1–10)
CHLORDIAZEP SERPL-MCNC: <0.1 UG/ML (ref 0.1–0.9)
DIAZEPAM SERPL-MCNC: <0.1 UG/ML (ref 0.1–0.9)
ETHANOL BLD GC-MCNC: <.01 G/DL (ref 0–0.01)
ISOPROPANOL SERPL-MCNC: <.01 G/DL (ref 0–0.01)
Lab: ABNORMAL
METHANOL SERPL-MCNC: <.01 G/DL (ref 0–0.01)
NORCHLORDIAZEP SERPL-MCNC: <0.1 UG/ML (ref 0.1–0.6)
NORDIAZEPAM SERPL-MCNC: <0.1 UG/ML (ref 0.1–1.4)
PENTOBARB SERPL-MCNC: <1 UG/ML (ref 1–5)
PHENOBARB SERPL-MCNC: <1 UG/ML (ref 15–40)
SALICYLATES SERPL-MCNC: ABNORMAL UG/ML (ref 30–250)

## 2021-07-09 ENCOUNTER — APPOINTMENT (OUTPATIENT)
Dept: CT IMAGING | Facility: HOSPITAL | Age: 61
End: 2021-07-09

## 2021-07-09 ENCOUNTER — HOSPITAL ENCOUNTER (EMERGENCY)
Facility: HOSPITAL | Age: 61
Discharge: HOME | End: 2021-07-09
Admitting: EMERGENCY MEDICINE

## 2021-07-09 VITALS
SYSTOLIC BLOOD PRESSURE: 138 MMHG | WEIGHT: 173 LBS | OXYGEN SATURATION: 97 % | DIASTOLIC BLOOD PRESSURE: 84 MMHG | HEART RATE: 68 BPM | TEMPERATURE: 98 F | RESPIRATION RATE: 18 BRPM | HEIGHT: 69 IN | BODY MASS INDEX: 25.62 KG/M2

## 2021-07-09 DIAGNOSIS — R51.9 NONINTRACTABLE HEADACHE, UNSPECIFIED CHRONICITY PATTERN, UNSPECIFIED HEADACHE TYPE: Primary | ICD-10-CM

## 2021-07-09 LAB
ANION GAP SERPL CALCULATED.3IONS-SCNC: 9 MMOL/L (ref 5–15)
BASOPHILS # BLD MANUAL: 0.06 10*3/MM3 (ref 0–0.2)
BASOPHILS NFR BLD AUTO: 1 % (ref 0–1.5)
BUN SERPL-MCNC: 10 MG/DL (ref 8–23)
BUN/CREAT SERPL: 10.8 (ref 7–25)
CALCIUM SPEC-SCNC: 8.3 MG/DL (ref 8.6–10.5)
CHLORIDE SERPL-SCNC: 106 MMOL/L (ref 98–107)
CO2 SERPL-SCNC: 27 MMOL/L (ref 22–29)
CREAT SERPL-MCNC: 0.93 MG/DL (ref 0.57–1)
DEPRECATED RDW RBC AUTO: 47.3 FL (ref 37–54)
ERYTHROCYTE [DISTWIDTH] IN BLOOD BY AUTOMATED COUNT: 14.9 % (ref 12.3–15.4)
ERYTHROCYTE [SEDIMENTATION RATE] IN BLOOD: 20 MM/HR (ref 0–30)
GFR SERPL CREATININE-BSD FRML MDRD: 61 ML/MIN/1.73
GLUCOSE SERPL-MCNC: 76 MG/DL (ref 65–99)
HCT VFR BLD AUTO: 34.7 % (ref 34–46.6)
HGB BLD-MCNC: 11.6 G/DL (ref 12–15.9)
LYMPHOCYTES # BLD MANUAL: 2.99 10*3/MM3 (ref 0.7–3.1)
LYMPHOCYTES NFR BLD MANUAL: 16 % (ref 5–12)
LYMPHOCYTES NFR BLD MANUAL: 44 % (ref 19.6–45.3)
MCH RBC QN AUTO: 30.4 PG (ref 26.6–33)
MCHC RBC AUTO-ENTMCNC: 33.5 G/DL (ref 31.5–35.7)
MCV RBC AUTO: 90.8 FL (ref 79–97)
MONOCYTES # BLD AUTO: 0.98 10*3/MM3 (ref 0.1–0.9)
NEUTROPHILS # BLD AUTO: 2.07 10*3/MM3 (ref 1.7–7)
NEUTROPHILS NFR BLD MANUAL: 30 % (ref 42.7–76)
NEUTS BAND NFR BLD MANUAL: 4 % (ref 0–5)
PLAT MORPH BLD: NORMAL
PLATELET # BLD AUTO: 399 10*3/MM3 (ref 140–450)
PMV BLD AUTO: 8 FL (ref 6–12)
POTASSIUM SERPL-SCNC: 4 MMOL/L (ref 3.5–5.2)
RBC # BLD AUTO: 3.83 10*6/MM3 (ref 3.77–5.28)
RBC MORPH BLD: NORMAL
SCAN SLIDE: NORMAL
SODIUM SERPL-SCNC: 142 MMOL/L (ref 136–145)
VARIANT LYMPHS NFR BLD MANUAL: 5 % (ref 0–5)
WBC # BLD AUTO: 6.1 10*3/MM3 (ref 3.4–10.8)
WBC MORPH BLD: NORMAL

## 2021-07-09 PROCEDURE — 85652 RBC SED RATE AUTOMATED: CPT | Performed by: PHYSICIAN ASSISTANT

## 2021-07-09 PROCEDURE — 96374 THER/PROPH/DIAG INJ IV PUSH: CPT

## 2021-07-09 PROCEDURE — 99283 EMERGENCY DEPT VISIT LOW MDM: CPT

## 2021-07-09 PROCEDURE — 25010000002 DIPHENHYDRAMINE PER 50 MG: Performed by: PHYSICIAN ASSISTANT

## 2021-07-09 PROCEDURE — 85025 COMPLETE CBC W/AUTO DIFF WBC: CPT | Performed by: PHYSICIAN ASSISTANT

## 2021-07-09 PROCEDURE — 25010000002 DROPERIDOL PER 5 MG: Performed by: PHYSICIAN ASSISTANT

## 2021-07-09 PROCEDURE — 70450 CT HEAD/BRAIN W/O DYE: CPT

## 2021-07-09 PROCEDURE — 96375 TX/PRO/DX INJ NEW DRUG ADDON: CPT

## 2021-07-09 PROCEDURE — 85007 BL SMEAR W/DIFF WBC COUNT: CPT | Performed by: PHYSICIAN ASSISTANT

## 2021-07-09 PROCEDURE — 80048 BASIC METABOLIC PNL TOTAL CA: CPT | Performed by: PHYSICIAN ASSISTANT

## 2021-07-09 RX ORDER — DIPHENHYDRAMINE HYDROCHLORIDE 50 MG/ML
25 INJECTION INTRAMUSCULAR; INTRAVENOUS ONCE
Status: COMPLETED | OUTPATIENT
Start: 2021-07-09 | End: 2021-07-09

## 2021-07-09 RX ORDER — SODIUM CHLORIDE 0.9 % (FLUSH) 0.9 %
10 SYRINGE (ML) INJECTION AS NEEDED
Status: DISCONTINUED | OUTPATIENT
Start: 2021-07-09 | End: 2021-07-09 | Stop reason: HOSPADM

## 2021-07-09 RX ORDER — PROCHLORPERAZINE EDISYLATE 5 MG/ML
10 INJECTION INTRAMUSCULAR; INTRAVENOUS ONCE
Status: DISCONTINUED | OUTPATIENT
Start: 2021-07-09 | End: 2021-07-09

## 2021-07-09 RX ORDER — BUTALBITAL, ACETAMINOPHEN AND CAFFEINE 50; 325; 40 MG/1; MG/1; MG/1
1 TABLET ORAL EVERY 4 HOURS PRN
COMMUNITY
End: 2021-08-02

## 2021-07-09 RX ORDER — DROPERIDOL 2.5 MG/ML
2.5 INJECTION, SOLUTION INTRAMUSCULAR; INTRAVENOUS ONCE
Status: COMPLETED | OUTPATIENT
Start: 2021-07-09 | End: 2021-07-09

## 2021-07-09 RX ADMIN — SODIUM CHLORIDE 1000 ML: 9 INJECTION, SOLUTION INTRAVENOUS at 14:56

## 2021-07-09 RX ADMIN — DIPHENHYDRAMINE HYDROCHLORIDE 25 MG: 50 INJECTION INTRAMUSCULAR; INTRAVENOUS at 14:56

## 2021-07-09 RX ADMIN — DROPERIDOL 2.5 MG: 2.5 INJECTION, SOLUTION INTRAMUSCULAR; INTRAVENOUS at 15:26

## 2021-07-09 NOTE — ED PROVIDER NOTES
Subjective   Chief Complaint: Headache    Patient is a 60 year old  female with history of CAD, previous stroke with residual left-sided weakness and facial droop presents the ER with complaints of right-sided headache that started yesterday.  Patient reports headache started yesterday, describes as a constant throbbing pain that has gradually gotten worse today.  She rates her pain an 8/10.  Patient denies thunderclap onset, denies worst headache of her life patient states she has tried taking Fioricet per her PCP with no relief.   She reports some lightheadedness and blurry vision as well.  She denies any neck or back pain.  She denies any fever or chills.  She denies any syncope or slurred speech.  No chest pain, shortness of breath, abdominal pain, nausea vomiting or diarrhea.  She reports residual left-sided weakness due to previous stroke in April 2021, however she reports no changes with this.  Patient also reports that she has had intermittent headaches since her stroke that are similar to her headache today.  Patient takes Plavix.    Location: Right temple    Quality: Throbbing    Duration: 1 day    Timing: Constant    Severity: Moderate    Associated Symptoms: Blurry vision, lightheadedness    PCP: Jeannette Powers      History provided by:  Patient      Review of Systems   Constitutional: Negative for chills and fever.   HENT: Negative for sore throat and trouble swallowing.    Eyes: Positive for photophobia and visual disturbance.   Respiratory: Negative for shortness of breath and wheezing.    Cardiovascular: Negative for chest pain.   Gastrointestinal: Positive for nausea. Negative for abdominal pain, diarrhea and vomiting.   Genitourinary: Negative for dysuria.   Musculoskeletal: Negative for back pain, myalgias and neck pain.   Skin: Negative for rash.   Neurological: Positive for facial asymmetry and headaches. Negative for light-headedness and numbness.        Residual left sided weakness  and facial droop from stroke in April 2021   Psychiatric/Behavioral: Negative for behavioral problems.   All other systems reviewed and are negative.      Past Medical History:   Diagnosis Date   • AMI (acute myocardial infarction) (CMS/HCC)    • Chronic back pain    • Mood disorder (CMS/HCC)    • Stroke (CMS/HCC)    • Substance abuse (CMS/HCC)        Allergies   Allergen Reactions   • Aspirin Other (See Comments)   • Bee Venom Anaphylaxis   • Ciprofloxacin Headache   • Compazine [Prochlorperazine] Seizure   • Contrast Dye Anaphylaxis   • Levofloxacin Rash   • Meperidine Shortness Of Breath   • Promethazine Shortness Of Breath   • Pyridium [Phenazopyridine] Unknown - High Severity   • Vancomycin Anaphylaxis   • Azithromycin Rash       Past Surgical History:   Procedure Laterality Date   • CHOLECYSTECTOMY     • GALLBLADDER SURGERY     • HYSTERECTOMY     • JOINT REPLACEMENT     • THYROIDECTOMY, PARTIAL     • TONSILLECTOMY     • TUBAL ABDOMINAL LIGATION         Family History   Problem Relation Age of Onset   • Heart disease Sister        Social History     Socioeconomic History   • Marital status: Legally      Spouse name: Not on file   • Number of children: Not on file   • Years of education: Not on file   • Highest education level: Not on file   Tobacco Use   • Smoking status: Current Every Day Smoker     Packs/day: 1.00   • Smokeless tobacco: Never Used   • Tobacco comment: not interested   Vaping Use   • Vaping Use: Some days   • Substances: Nicotine   • Devices: Disposable, Pre-filled or refillable cartridge   Substance and Sexual Activity   • Alcohol use: Not Currently   • Drug use: Not Currently   • Sexual activity: Yes           Objective   Physical Exam  Vitals and nursing note reviewed.   Constitutional:       General: She is not in acute distress.     Appearance: Normal appearance. She is well-developed. She is not ill-appearing or toxic-appearing.   HENT:      Head: Normocephalic and atraumatic.       Nose: Nose normal.      Mouth/Throat:      Mouth: Mucous membranes are moist.      Pharynx: No oropharyngeal exudate.   Eyes:      Extraocular Movements: Extraocular movements intact.      Conjunctiva/sclera: Conjunctivae normal.      Pupils: Pupils are equal, round, and reactive to light.      Comments: Pupils PERRLA, EOMs normal, no nystagmus   Neck:      Comments: Cervical spine: No midline tenderness to palpation. No step-offs or deformities. Pain-free range of motion.  No nuchal rigidity, no meningismus  Cardiovascular:      Rate and Rhythm: Normal rate and regular rhythm.      Pulses: Normal pulses.      Heart sounds: Normal heart sounds. No murmur heard.     Pulmonary:      Effort: Pulmonary effort is normal. No respiratory distress.      Breath sounds: Normal breath sounds. No wheezing.   Abdominal:      General: Bowel sounds are normal. There is no distension.      Palpations: Abdomen is soft.      Tenderness: There is no abdominal tenderness.   Musculoskeletal:         General: No tenderness. Normal range of motion.      Cervical back: Normal range of motion. No rigidity or tenderness.   Skin:     General: Skin is warm and dry.      Capillary Refill: Capillary refill takes less than 2 seconds.      Findings: No rash.   Neurological:      General: No focal deficit present.      Mental Status: She is alert and oriented to person, place, and time. Mental status is at baseline.      Motor: Weakness present.      Comments: Residual left-sided facial droop, left-sided weakness from previous stroke  Patient states that this is unchanged from her baseline since her stroke   Psychiatric:         Mood and Affect: Mood normal.         Behavior: Behavior normal.         Procedures           ED Course  ED Course as of Jul 09 1757 Fri Jul 09, 2021   1520 Patient reported allergy to Compazine, droperidol added    [MM]   1630 Patient is easily arousable by verbal stimuli, she reports that she feels much better.   "Currently awaiting for patient to go to CT    [MM]   1719 Patient reevaluated, easily arousable, reports her headache is almost completely improved.  Patient is agreeable to discharge.    [MM]      ED Course User Index  [MM] Ashley Pacheco PA    /79 (Patient Position: Lying)   Pulse 53   Temp 97.8 °F (36.6 °C) (Oral)   Resp 16   Ht 175.3 cm (69\")   Wt 78.5 kg (173 lb)   SpO2 99%   BMI 25.55 kg/m²   Labs Reviewed   BASIC METABOLIC PANEL - Abnormal; Notable for the following components:       Result Value    Calcium 8.3 (*)     All other components within normal limits    Narrative:     GFR Normal >60  Chronic Kidney Disease <60  Kidney Failure <15     CBC WITH AUTO DIFFERENTIAL - Abnormal; Notable for the following components:    Hemoglobin 11.6 (*)     All other components within normal limits    Narrative:     The previously reported component NRBC is no longer being reported. Previous result was 0.1 /100 WBC (Reference Range: 0.0-0.2 /100 WBC) on 7/9/2021 at 1516 EDT.   MANUAL DIFFERENTIAL - Abnormal; Notable for the following components:    Neutrophil % 30.0 (*)     Monocyte % 16.0 (*)     Monocytes Absolute 0.98 (*)     All other components within normal limits    Narrative:     Slide Reviewed     SEDIMENTATION RATE - Normal   SCAN SLIDE   CBC AND DIFFERENTIAL    Narrative:     The following orders were created for panel order CBC & Differential.  Procedure                               Abnormality         Status                     ---------                               -----------         ------                     Scan Slide[[id number]]                                       Final result               CBC Auto Differential[[id number]]        Abnormal            Final result                 Please view results for these tests on the individual orders.     Medications   sodium chloride 0.9 % flush 10 mL (has no administration in time range)   diphenhydrAMINE (BENADRYL) injection 25 mg (25 mg " Intravenous Given 7/9/21 1456)   sodium chloride 0.9 % bolus 1,000 mL (1,000 mL Intravenous New Bag 7/9/21 1456)   droperidol (INAPSINE) injection 2.5 mg (2.5 mg Intravenous Given 7/9/21 1526)     CT Head Without Contrast    Result Date: 7/9/2021  1.No acute intracranial hemorrhage. 2.Stable right frontal, occipital, and temporal areas of hypodensity, likely due to evolving subacute to chronic infarcts.  Electronically Signed By-Ellen Lopez MD On:7/9/2021 4:50 PM This report was finalized on [id number] by  Ellen Lopez MD.                                           MDM  Number of Diagnoses or Management Options  Nonintractable headache, unspecified chronicity pattern, unspecified headache type  Diagnosis management comments: MEDICAL DECISION  Epic Chart Review:  Comorbidities: CAD, stroke, substance abuse  Differentials: Migraine, intracranial hemorrhage, stroke, brain tumor; this list is not all inclusive and does not constitute the entirety of considered causes  Radiology interpretation:  Images reviewed by me and interpreted by radiologist, as above  Lab interpretation:  Labs viewed by me significant for, as above    While in the ED IV was placed and labs were obtained appropriate PPE was worn during exam and throughout all encounters with the patient.  Patient had the above evaluation.  Physical exam relatively unremarkable, patient does have residual left-sided facial droop, left-sided weakness from previous stroke, patient reports there is no change.  Patient is awake and alert, oriented x4 with no new focal neurological deficits.  Speech is clear.  IV established, lab work obtained.  Patient is given 25 mg droperidol, 2.5 mg Inapsine.  Patient reported almost complete relief of her headache after this medication.  CT head negative for acute intracranial abnormalities, stable right frontal, occipital and temporal areas of hypodensity likely due to evolving subacute to chronic infarcts.  On multiple  reevaluations patient is easily arousable with verbal stimuli, reports improvement in her headache.  Neurological exam is grossly unchanged, at patient's baseline.  Patient be discharged encouraged to follow-up with primary care and neurologist.  Patient states that she has Fioricet at home to take as needed for migraines.    Discharge plan and instructions were discussed with the patient who verbalized understanding and is in agreement with the plan, all questions were answered at this time.  Patient is aware of signs symptoms that would require immediate return to the emergency room.  Patient understands importance of following up with primary care provider for further evaluation and worsening concerns as well as blood pressure recheck in the next 4 weeks.    Patient remained afebrile, nontoxic-appearing, no acute respiratory distress throughout entire emergency room stay.  Patient was discharged in improved stable condition.         Amount and/or Complexity of Data Reviewed  Clinical lab tests: reviewed and ordered  Tests in the radiology section of CPT®: reviewed and ordered    Patient Progress  Patient progress: stable      Final diagnoses:   Nonintractable headache, unspecified chronicity pattern, unspecified headache type       ED Disposition  ED Disposition     ED Disposition Condition Comment    Discharge Stable           Jeannette Powers MD  [address] [place] [address]  [phone]    Schedule an appointment as soon as possible for a visit in 2 days  As needed, If symptoms worsen         Medication List      No changes were made to your prescriptions during this visit.          Ashley Pacheco PA  07/09/21 1756       Ashley Pacheco PA  07/09/21 1759

## 2021-07-09 NOTE — DISCHARGE INSTRUCTIONS
Continue taking Fioricet as needed for migraines or headaches take only as prescribed.    Drink plenty of clear fluids    Follow-up with primary care for further management evaluation    Follow-up with your neurologist for recheck    Return to the ER for new or worsening symptoms

## 2021-07-09 NOTE — ED NOTES
Pt c/o h/a in temple onset 1 day ago with lightheadedness, blurred vision and light sensitivity; states had stroke 4/20/21 with frequent headaches since.     Bonnie Saunders RN  07/09/21 7307

## 2021-08-02 ENCOUNTER — OFFICE VISIT (OUTPATIENT)
Dept: NEUROLOGY | Facility: CLINIC | Age: 61
End: 2021-08-02

## 2021-08-02 VITALS
DIASTOLIC BLOOD PRESSURE: 77 MMHG | TEMPERATURE: 97.5 F | HEART RATE: 68 BPM | HEIGHT: 69 IN | BODY MASS INDEX: 24.59 KG/M2 | WEIGHT: 166 LBS | SYSTOLIC BLOOD PRESSURE: 124 MMHG

## 2021-08-02 DIAGNOSIS — Z86.73 H/O: CVA (CEREBROVASCULAR ACCIDENT): Primary | Chronic | ICD-10-CM

## 2021-08-02 DIAGNOSIS — F17.200 SMOKING ADDICTION: ICD-10-CM

## 2021-08-02 DIAGNOSIS — G47.30 SLEEP APNEA IN ADULT: ICD-10-CM

## 2021-08-02 DIAGNOSIS — G44.59 OTHER COMPLICATED HEADACHE SYNDROME: ICD-10-CM

## 2021-08-02 PROCEDURE — 99215 OFFICE O/P EST HI 40 MIN: CPT | Performed by: NURSE PRACTITIONER

## 2021-08-02 RX ORDER — HYDROCODONE BITARTRATE AND ACETAMINOPHEN 5; 325 MG/1; MG/1
TABLET ORAL
COMMUNITY
Start: 2021-06-10

## 2021-08-02 RX ORDER — BUSPIRONE HYDROCHLORIDE 15 MG/1
TABLET ORAL
COMMUNITY
Start: 2021-06-02

## 2021-08-02 RX ORDER — MELOXICAM 15 MG/1
15 TABLET ORAL DAILY
COMMUNITY
Start: 2021-07-16 | End: 2021-08-02

## 2021-08-02 RX ORDER — PROPRANOLOL HCL 60 MG
60 CAPSULE, EXTENDED RELEASE 24HR ORAL DAILY
Qty: 30 CAPSULE | Refills: 6 | Status: CANCELLED | OUTPATIENT
Start: 2021-08-02

## 2021-08-02 RX ORDER — CEFDINIR 300 MG/1
300 CAPSULE ORAL 2 TIMES DAILY
COMMUNITY
Start: 2021-04-25 | End: 2023-01-29

## 2021-08-02 RX ORDER — NORTRIPTYLINE HYDROCHLORIDE 25 MG/1
CAPSULE ORAL
COMMUNITY
Start: 2021-07-14

## 2021-08-02 RX ORDER — NAPROXEN 500 MG/1
250 TABLET ORAL 2 TIMES DAILY WITH MEALS
Qty: 60 TABLET | Refills: 5 | Status: SHIPPED | OUTPATIENT
Start: 2021-08-02

## 2021-08-24 ENCOUNTER — TELEPHONE (OUTPATIENT)
Dept: NEUROLOGY | Facility: CLINIC | Age: 61
End: 2021-08-24

## 2021-08-24 NOTE — TELEPHONE ENCOUNTER
I REACHED OUT TO MANUELITO AND LET HER KNOW HER OPTIONS.    I BELIEVE THE HOSPITAL HAD ALREADY CONTACTED HER TO TRY TO MAKE APPT. AND THEN IN TURN THEY GAVE HER THE OFFICE PHONE NUMBER.     MANUELITO STATED SHE WANTS TO DO THE STUDY AT HOME, SO I TOLD HER TO CONTACT HER INSURANCE COMPANY TO MAKE SURE THEY WILL COVER IT.    SHE WILL CALL BACK TO LT US KNOW.

## 2021-08-24 NOTE — TELEPHONE ENCOUNTER
Provider: OLIVIA BRASHER    Caller: MANUELITO    Relationship to Patient: SELF    Reason for Call: MANUELITO IS CALLING BECAUSE SHE WOULD LIKE HER SLEEP STUDY DONE AT [facility].    PLEASE ADVISE.

## 2021-09-29 ENCOUNTER — TELEPHONE (OUTPATIENT)
Dept: NEUROLOGY | Facility: CLINIC | Age: 61
End: 2021-09-29

## 2021-09-29 NOTE — TELEPHONE ENCOUNTER
Caller: Magdalena Gamboa    Relationship: Self    Best call back number: [phone]    What was the call regarding: PT CALLED TO REQUEST FIORCET REFILL (SEPARATE ENCOUNTER) AND CHECK IF SHE HAD ANY APPTS SCHEDULED. NO VISITS SCHEDULED AT THE TIME OF PT'S CALL. LAST OV NOTE WITH OLIVIA STATES TO F/U IN 3 MONTHS WITH DR. SEIPEL FOR CVA & SLEEP CONCERNS. WAS UNSURE ABOUT SCHEDULING ANY F/U APPTS AS IT SEEMS PT HAS NOT YET COMPLETED THE ORDERED SLEEP STUDY.    PLEASE REVIEW AND ADVISE.

## 2021-09-29 NOTE — TELEPHONE ENCOUNTER
Caller: Magdalena Gamboa    Relationship: Self    Best call back number: [phone]    Medication requested (name and dosage): butalbital-acetaminophen-caffeine (FIORICET, ESGIC) -40 MG per tablet 1 tablet     UNABLE TO ATTACH MEDICATION VIA SMARTLINK AS MEDICATION WAS NOT LISTED AS A CURRENT MEDICATION OF PT'S.    Pharmacy where request should be sent: [address] [phone] [facility] [phone] FX    Additional details provided by patient: PT STATES SHE IS COMPLETELY OUT OF THE MEDICATION. MEDICATION WAS PROVIDED TO PT IN THE HOSPITAL BY DIXIE MAY.    Does the patient have less than a 3 day supply:  [x] Yes  [] No    PLEASE REVIEW AND ADVISE.    Petra Carmona Rep   09/29/21 14:29 EDT

## 2021-09-30 NOTE — TELEPHONE ENCOUNTER
Caller: Magdalena Gamboa    Relationship: Self    Phone number dialed: [phone]    What was the call regarding: ATTEMPTED TO CALL PT BACK TO INFORM HER, PER NAVI, SHE WILL NEED TO CONTACT SLEEP LAB TO SCHEDULE SLEEP STUDY BEFORE FOLLOWING UP IN THE OFFICE.    DOCUMENTING PER HUB PROTOCOL.

## 2022-02-14 ENCOUNTER — TELEPHONE (OUTPATIENT)
Dept: NEUROLOGY | Facility: CLINIC | Age: 62
End: 2022-02-14

## 2022-02-14 NOTE — TELEPHONE ENCOUNTER
Provider:  OLIVIA   Caller:  MANUELITO   Relationship to Patient:  PT     Phone Number: [phone]  Reason for Call:  PT CALLING FIRST STATED TO ME SHE NEED DISABLITY PAPERWORK I PUT IN THE NOTE THEN A I WAS READING PT STATE DNO SHE NEEDS A F/U AS HER PCP BELIEVES SHE HAS DEMENTIA . NOTE IN CHART STATES PT NEEDS TO COMPLETE SLEP STUDY BEFORE BEING SCHED FOR F/U ?/   When was the patient last seen: 08/02/2021    PT IS WANTING TO SEE ABOUT HAVING A HOME SLEEP STUDY .  STATES SHE REQUESTED THIS BEFORE AND HAS NOT HEARD BACK

## 2022-02-16 NOTE — TELEPHONE ENCOUNTER
PATIENT CALLED BACK ABOUT THIS    [phone]    SHE HAD WANTED A HOME SLEEP STUDY IN [place]. I CALLED OVER TO [facility] AND SHE TOLD ME THAT PATIENT CAN ONLY HAVE HST W/ [facility] BECAUSE DR SEIPEL DOESN'T HAVE PRIVILEGES TO READ OTHER HOSPITALS' READINGS    I GAVE HER PHONE # FOR SLEEP LAB FOR HER TO CALL TO SET UP @ [place] AFTER SHE GETS TRANSPORTATION ARRANGED.    I TOLD HER TO CALL US BACK AFTER SLEEP STUDY DATE HAS BEEN SET AND WE CAN ARRANGE FOR FOLLOW UP WITH DR SEIPEL

## 2022-06-08 ENCOUNTER — APPOINTMENT (OUTPATIENT)
Dept: CT IMAGING | Facility: HOSPITAL | Age: 62
End: 2022-06-08

## 2022-06-08 ENCOUNTER — HOSPITAL ENCOUNTER (EMERGENCY)
Facility: HOSPITAL | Age: 62
Discharge: HOME | End: 2022-06-08
Attending: EMERGENCY MEDICINE | Admitting: EMERGENCY MEDICINE

## 2022-06-08 ENCOUNTER — APPOINTMENT (OUTPATIENT)
Dept: GENERAL RADIOLOGY | Facility: HOSPITAL | Age: 62
End: 2022-06-08

## 2022-06-08 VITALS
SYSTOLIC BLOOD PRESSURE: 124 MMHG | HEART RATE: 60 BPM | RESPIRATION RATE: 18 BRPM | WEIGHT: 161 LBS | OXYGEN SATURATION: 93 % | HEIGHT: 69 IN | DIASTOLIC BLOOD PRESSURE: 72 MMHG | BODY MASS INDEX: 23.85 KG/M2 | TEMPERATURE: 98.1 F

## 2022-06-08 DIAGNOSIS — G44.201 ACUTE INTRACTABLE TENSION-TYPE HEADACHE: ICD-10-CM

## 2022-06-08 DIAGNOSIS — N39.0 ACUTE UTI: Primary | ICD-10-CM

## 2022-06-08 LAB
ALBUMIN SERPL-MCNC: 3.7 G/DL (ref 3.5–5.2)
ALBUMIN/GLOB SERPL: 1.4 G/DL
ALP SERPL-CCNC: 54 U/L (ref 39–117)
ALT SERPL W P-5'-P-CCNC: 13 U/L (ref 1–33)
ANION GAP SERPL CALCULATED.3IONS-SCNC: 10 MMOL/L (ref 5–15)
APTT PPP: 27.3 SECONDS (ref 61–76.5)
AST SERPL-CCNC: 17 U/L (ref 1–32)
BACTERIA UR QL AUTO: ABNORMAL /HPF
BASOPHILS # BLD AUTO: 0 10*3/MM3 (ref 0–0.2)
BASOPHILS NFR BLD AUTO: 0.3 % (ref 0–1.5)
BILIRUB SERPL-MCNC: 0.3 MG/DL (ref 0–1.2)
BILIRUB UR QL STRIP: NEGATIVE
BUN SERPL-MCNC: 14 MG/DL (ref 8–23)
BUN/CREAT SERPL: 18.4 (ref 7–25)
CALCIUM SPEC-SCNC: 8.4 MG/DL (ref 8.6–10.5)
CHLORIDE SERPL-SCNC: 107 MMOL/L (ref 98–107)
CLARITY UR: ABNORMAL
CO2 SERPL-SCNC: 24 MMOL/L (ref 22–29)
COLOR UR: YELLOW
CREAT SERPL-MCNC: 0.76 MG/DL (ref 0.57–1)
DEPRECATED RDW RBC AUTO: 49.4 FL (ref 37–54)
EGFRCR SERPLBLD CKD-EPI 2021: 89.3 ML/MIN/1.73
EOSINOPHIL # BLD AUTO: 0.1 10*3/MM3 (ref 0–0.4)
EOSINOPHIL NFR BLD AUTO: 1 % (ref 0.3–6.2)
ERYTHROCYTE [DISTWIDTH] IN BLOOD BY AUTOMATED COUNT: 14.8 % (ref 12.3–15.4)
GLOBULIN UR ELPH-MCNC: 2.6 GM/DL
GLUCOSE SERPL-MCNC: 66 MG/DL (ref 65–99)
GLUCOSE UR STRIP-MCNC: NEGATIVE MG/DL
HCT VFR BLD AUTO: 36 % (ref 34–46.6)
HGB BLD-MCNC: 11.8 G/DL (ref 12–15.9)
HGB UR QL STRIP.AUTO: NEGATIVE
HYALINE CASTS UR QL AUTO: ABNORMAL /LPF
KETONES UR QL STRIP: ABNORMAL
LEUKOCYTE ESTERASE UR QL STRIP.AUTO: ABNORMAL
LYMPHOCYTES # BLD AUTO: 3.1 10*3/MM3 (ref 0.7–3.1)
LYMPHOCYTES NFR BLD AUTO: 25.7 % (ref 19.6–45.3)
MCH RBC QN AUTO: 31.3 PG (ref 26.6–33)
MCHC RBC AUTO-ENTMCNC: 32.7 G/DL (ref 31.5–35.7)
MCV RBC AUTO: 95.6 FL (ref 79–97)
MONOCYTES # BLD AUTO: 0.7 10*3/MM3 (ref 0.1–0.9)
MONOCYTES NFR BLD AUTO: 5.7 % (ref 5–12)
NEUTROPHILS NFR BLD AUTO: 67.3 % (ref 42.7–76)
NEUTROPHILS NFR BLD AUTO: 8.1 10*3/MM3 (ref 1.7–7)
NITRITE UR QL STRIP: NEGATIVE
NRBC BLD AUTO-RTO: 0 /100 WBC (ref 0–0.2)
PH UR STRIP.AUTO: <=5 [PH] (ref 5–8)
PLATELET # BLD AUTO: 264 10*3/MM3 (ref 140–450)
PMV BLD AUTO: 8.7 FL (ref 6–12)
POTASSIUM SERPL-SCNC: 3.5 MMOL/L (ref 3.5–5.2)
PROT SERPL-MCNC: 6.3 G/DL (ref 6–8.5)
PROT UR QL STRIP: ABNORMAL
RBC # BLD AUTO: 3.76 10*6/MM3 (ref 3.77–5.28)
RBC # UR STRIP: ABNORMAL /HPF
REF LAB TEST METHOD: ABNORMAL
SODIUM SERPL-SCNC: 141 MMOL/L (ref 136–145)
SP GR UR STRIP: 1.03 (ref 1–1.03)
SQUAMOUS #/AREA URNS HPF: ABNORMAL /HPF
TROPONIN T SERPL-MCNC: <0.01 NG/ML (ref 0–0.03)
UROBILINOGEN UR QL STRIP: ABNORMAL
WBC # UR STRIP: ABNORMAL /HPF
WBC NRBC COR # BLD: 12.1 10*3/MM3 (ref 3.4–10.8)

## 2022-06-08 PROCEDURE — 71045 X-RAY EXAM CHEST 1 VIEW: CPT

## 2022-06-08 PROCEDURE — 25010000002 DIPHENHYDRAMINE PER 50 MG

## 2022-06-08 PROCEDURE — 85025 COMPLETE CBC W/AUTO DIFF WBC: CPT

## 2022-06-08 PROCEDURE — 99284 EMERGENCY DEPT VISIT MOD MDM: CPT

## 2022-06-08 PROCEDURE — 96375 TX/PRO/DX INJ NEW DRUG ADDON: CPT

## 2022-06-08 PROCEDURE — 80053 COMPREHEN METABOLIC PANEL: CPT

## 2022-06-08 PROCEDURE — 85730 THROMBOPLASTIN TIME PARTIAL: CPT

## 2022-06-08 PROCEDURE — 84484 ASSAY OF TROPONIN QUANT: CPT

## 2022-06-08 PROCEDURE — 25010000002 LORAZEPAM PER 2 MG

## 2022-06-08 PROCEDURE — 25010000002 METOCLOPRAMIDE PER 10 MG

## 2022-06-08 PROCEDURE — P9612 CATHETERIZE FOR URINE SPEC: HCPCS

## 2022-06-08 PROCEDURE — 70450 CT HEAD/BRAIN W/O DYE: CPT

## 2022-06-08 PROCEDURE — 81001 URINALYSIS AUTO W/SCOPE: CPT

## 2022-06-08 PROCEDURE — 96374 THER/PROPH/DIAG INJ IV PUSH: CPT

## 2022-06-08 PROCEDURE — 93005 ELECTROCARDIOGRAM TRACING: CPT

## 2022-06-08 RX ORDER — NITROFURANTOIN 25; 75 MG/1; MG/1
100 CAPSULE ORAL 2 TIMES DAILY
Qty: 10 CAPSULE | Refills: 0 | Status: SHIPPED | OUTPATIENT
Start: 2022-06-08 | End: 2022-06-13

## 2022-06-08 RX ORDER — METOCLOPRAMIDE HYDROCHLORIDE 5 MG/ML
10 INJECTION INTRAMUSCULAR; INTRAVENOUS ONCE
Status: COMPLETED | OUTPATIENT
Start: 2022-06-08 | End: 2022-06-08

## 2022-06-08 RX ORDER — SODIUM CHLORIDE 0.9 % (FLUSH) 0.9 %
10 SYRINGE (ML) INJECTION AS NEEDED
Status: DISCONTINUED | OUTPATIENT
Start: 2022-06-08 | End: 2022-06-09 | Stop reason: HOSPADM

## 2022-06-08 RX ORDER — BUSPIRONE HYDROCHLORIDE 5 MG/1
10 TABLET ORAL ONCE
Status: DISCONTINUED | OUTPATIENT
Start: 2022-06-08 | End: 2022-06-09 | Stop reason: HOSPADM

## 2022-06-08 RX ORDER — ACETAMINOPHEN 500 MG
1000 TABLET ORAL ONCE
Status: DISCONTINUED | OUTPATIENT
Start: 2022-06-08 | End: 2022-06-09 | Stop reason: HOSPADM

## 2022-06-08 RX ORDER — DIPHENHYDRAMINE HYDROCHLORIDE 50 MG/ML
25 INJECTION INTRAMUSCULAR; INTRAVENOUS ONCE
Status: COMPLETED | OUTPATIENT
Start: 2022-06-08 | End: 2022-06-08

## 2022-06-08 RX ORDER — LORAZEPAM 2 MG/ML
1 INJECTION INTRAMUSCULAR ONCE
Status: COMPLETED | OUTPATIENT
Start: 2022-06-08 | End: 2022-06-08

## 2022-06-08 RX ADMIN — METOCLOPRAMIDE 10 MG: 5 INJECTION, SOLUTION INTRAMUSCULAR; INTRAVENOUS at 21:05

## 2022-06-08 RX ADMIN — DIPHENHYDRAMINE HYDROCHLORIDE 25 MG: 50 INJECTION, SOLUTION INTRAMUSCULAR; INTRAVENOUS at 21:04

## 2022-06-08 RX ADMIN — SODIUM CHLORIDE 500 ML: 9 INJECTION, SOLUTION INTRAVENOUS at 18:24

## 2022-06-08 RX ADMIN — LORAZEPAM 1 MG: 2 INJECTION INTRAMUSCULAR; INTRAVENOUS at 22:03

## 2022-06-09 NOTE — ED PROVIDER NOTES
"Subjective   Chief Complaint: Headache    Context: Patient is a 61-year-old  female who presents today with complaints of headache since about 4:00 today after coming home from [facility].  She states that she has felt \"foggy\" and mildly confused.  She states that she has a headache for the past couple days and rates it 3 or 4 out of 10.  She reports a history of a CVA with left-sided deficit, including a left-sided facial droop.  She states that she takes Plavix.  She also states that she has felt like she is panicking and has a history of anxiety in which she takes BuSpar 4 times a day for.  She denies vision changes, weakness, dysuria, hesitancy or constipation.  She reports allergies to Compazine, aspirin, contrast, mycins and many more.    Duration: 6 hours    Timing: Waxes and wanes    Severity: 3/10    Associated: Confusion          Review of Systems   Constitutional: Negative for fatigue and fever.   HENT: Negative for congestion, sinus pressure and sore throat.    Eyes: Negative.    Respiratory: Negative for cough, chest tightness and shortness of breath.    Cardiovascular: Negative for chest pain and palpitations.   Gastrointestinal: Negative for abdominal pain, constipation, diarrhea, nausea and vomiting.   Endocrine: Negative.    Genitourinary: Negative for dysuria, frequency and urgency.   Musculoskeletal: Negative for arthralgias and myalgias.   Skin: Negative.    Allergic/Immunologic: Negative.    Neurological: Positive for facial asymmetry and headaches. Negative for dizziness, speech difficulty, weakness and numbness.   Psychiatric/Behavioral: Positive for confusion. The patient is not nervous/anxious.    All other systems reviewed and are negative.      Past Medical History:   Diagnosis Date   • AMI (acute myocardial infarction) (CMS/[facility])    • Chronic back pain    • Mood disorder (CMS/[facility])    • Stroke (CMS/[facility])    • Substance abuse (CMS/[facility])        Allergies   Allergen Reactions   • Aspirin " Other (See Comments)   • Bee Venom Anaphylaxis   • Cholestatin Other (See Comments)   • Ciprofloxacin Headache   • Compazine [Prochlorperazine] Seizure   • Contrast Dye Anaphylaxis   • Ibuprofen Other (See Comments)   • Levofloxacin Rash   • Meperidine Shortness Of Breath   • Promethazine Shortness Of Breath   • Pyridium [Phenazopyridine] Unknown - High Severity   • Vancomycin Anaphylaxis   • Droperidol Other (See Comments)   • Iodinated Diagnostic Agents Other (See Comments)   • Latex Other (See Comments)   • Oxytetracycline Other (See Comments)   • Phenazopyridine Hcl Other (See Comments)   • Promethazine-Phenylephrine Other (See Comments)   • Azithromycin Rash       Past Surgical History:   Procedure Laterality Date   • CHOLECYSTECTOMY     • GALLBLADDER SURGERY     • HYSTERECTOMY     • JOINT REPLACEMENT     • THYROIDECTOMY, PARTIAL     • TONSILLECTOMY     • TUBAL ABDOMINAL LIGATION         Family History   Problem Relation Age of Onset   • Heart disease Sister        Social History     Socioeconomic History   • Marital status: Legally    Tobacco Use   • Smoking status: Current Every Day Smoker     Packs/day: 1.00   • Smokeless tobacco: Never Used   • Tobacco comment: not interested   Vaping Use   • Vaping Use: Some days   • Substances: Nicotine   • Devices: Disposable, Pre-filled or refillable cartridge   Substance and Sexual Activity   • Alcohol use: Not Currently   • Drug use: Not Currently   • Sexual activity: Yes           Objective   Physical Exam  Vitals and nursing note reviewed.   Constitutional:       General: She is not in acute distress.     Appearance: Normal appearance. She is normal weight. She is not ill-appearing.   HENT:      Head: Normocephalic and atraumatic.      Comments: Left-sided facial droop present upon arrival.  Patient reports it is unchanged from the stroke that caused it.  Eyes:      Extraocular Movements: Extraocular movements intact.      Pupils: Pupils are equal, round,  "and reactive to light.   Cardiovascular:      Rate and Rhythm: Normal rate and regular rhythm.      Pulses: Normal pulses.      Heart sounds: Normal heart sounds. No murmur heard.  Pulmonary:      Effort: Pulmonary effort is normal. No respiratory distress.      Breath sounds: Normal breath sounds.   Abdominal:      General: Bowel sounds are normal.      Palpations: Abdomen is soft.      Tenderness: There is no abdominal tenderness.   Musculoskeletal:         General: No swelling or tenderness. Normal range of motion.      Cervical back: Normal range of motion and neck supple. No tenderness.   Skin:     General: Skin is warm and dry.      Capillary Refill: Capillary refill takes less than 2 seconds.   Neurological:      General: No focal deficit present.      Mental Status: She is alert and oriented to person, place, and time. Mental status is at baseline.      GCS: GCS eye subscore is 4. GCS verbal subscore is 5. GCS motor subscore is 6.      Cranial Nerves: Cranial nerves are intact.      Sensory: Sensation is intact.      Motor: No weakness.      Deep Tendon Reflexes: Reflexes are normal and symmetric.      Comments: Patient answering questions appropriately.  She is A/O x4.   Psychiatric:         Mood and Affect: Mood normal.         Behavior: Behavior normal.         Procedures                 ED Course  ED Course as of 06/09/22 0129   Thu Jun 09, 2022   0128 EKG was reviewed by myself and read by Dr. Corral.  It shows sinus rhythm with a rate of 64.  No ectopy or ST elevation present.  No significant change from previous study. [SJ]      ED Course User Index  [SJ] Benita Fu, APRN      /72   Pulse 60   Temp 98.1 °F (36.7 °C) (Oral)   Resp 18   Ht 175.3 cm (69\")   Wt 73 kg (161 lb)   SpO2 93%   BMI 23.78 kg/m²   Labs Reviewed   COMPREHENSIVE METABOLIC PANEL - Abnormal; Notable for the following components:       Result Value    Calcium 8.4 (*)     All other components within normal limits    " Narrative:     GFR Normal >60  Chronic Kidney Disease <60  Kidney Failure <15     APTT - Abnormal; Notable for the following components:    PTT 27.3 (*)     All other components within normal limits   URINALYSIS W/ MICROSCOPIC IF INDICATED (NO CULTURE) - Abnormal; Notable for the following components:    Appearance, UA Cloudy (*)     Ketones, UA Trace (*)     Protein, UA Trace (*)     Leuk Esterase, UA Trace (*)     All other components within normal limits   CBC WITH AUTO DIFFERENTIAL - Abnormal; Notable for the following components:    WBC 12.10 (*)     RBC 3.76 (*)     Hemoglobin 11.8 (*)     Neutrophils, Absolute 8.10 (*)     All other components within normal limits   URINALYSIS, MICROSCOPIC ONLY - Abnormal; Notable for the following components:    RBC, UA 6-12 (*)     WBC, UA 0-2 (*)     Bacteria, UA 4+ (*)     Squamous Epithelial Cells, UA 7-12 (*)     All other components within normal limits   TROPONIN (IN-HOUSE) - Normal    Narrative:     Troponin T Reference Range:  <= 0.03 ng/mL-   Negative for AMI  >0.03 ng/mL-     Abnormal for myocardial necrosis.  Clinicians would have to utilize clinical acumen, EKG, Troponin and serial changes to determine if it is an Acute Myocardial Infarction or myocardial injury due to an underlying chronic condition.       Results may be falsely decreased if patient taking Biotin.     CBC AND DIFFERENTIAL    Narrative:     The following orders were created for panel order CBC & Differential.  Procedure                               Abnormality         Status                     ---------                               -----------         ------                     CBC Auto Differential[[id number]]        Abnormal            Final result                 Please view results for these tests on the individual orders.     Medications   sodium chloride 0.9 % bolus 500 mL (0 mL Intravenous Stopped 6/8/22 1955)   metoclopramide (REGLAN) injection 10 mg (10 mg Intravenous Given 6/8/22  2105)   diphenhydrAMINE (BENADRYL) injection 25 mg (25 mg Intravenous Given 6/8/22 2104)   LORazepam (ATIVAN) injection 1 mg (1 mg Intravenous Given 6/8/22 2203)     CT Head Without Contrast    Result Date: 6/8/2022   1. Stable area of encephalomalacia in the right cerebral hemisphere secondary to an old infarct. 2. No acute findings.  Electronically Signed By-Chan Osborne MD On:6/8/2022 7:10 PM This report was finalized on [id number] by  Chan Osborne MD.    XR Chest 1 View    Result Date: 6/8/2022  No active disease.  Electronically Signed By-Chan Osborne MD On:6/8/2022 6:50 PM This report was finalized on [id number] by  Chan Osborne MD.                                               MDM  Number of Diagnoses or Management Options  Acute intractable tension-type headache  Acute UTI  Diagnosis management comments: Patient was placed in a gown prior to assessment.  She is alert, nontoxic-appearing and stable upon exam.  An IV was established and labs were obtained.  She was medicated with Reglan and Benadryl, as she has allergies to many of the other options I would have medicated her with.  She was sent to CT.    Chart Review: Upon chart review, it is revealed that patient had a CVA 4/19/2021.  She was admitted at that time.  She had also had repeat symptoms 6/27/2021.  She returned to the ER again on 7/9/2021 with complaints of a headache.  At that time she was discharged.    Comorbidities: History of CVA, history of UTI, bladder stimulator presents.  Differentials: CVA, UTI.  not all inclusive of differentials considered.   Discussion with Provider: Dr. Corral  EKG reviewed by me and interpreted by Dr. Corral    Lab Interpretation: As above.  Evidence of UTI present.  Sample is contaminated.  Patient was asked for another sample but was unable to provide 1 during her stay in the ER.  Radiology Interpretation: As above.  Negative for acute findings.    Appropriate PPE worn during exam.    Upon reassessment,  patient remains stable and alert.  She reports improvement in headache but states it is still there.  I spoke with patient regarding findings of blood work and CT with her know that it looks that she has a UTI.  Patient was not surprised that her symptoms might be caused by UTI.  She will be discharged with a prescription of Macrobid to take over the next several days and a follow-up to neurology if not improving.  Patient verbalizes understanding and states that she will call her neurologist in the morning.  She also states that she will start taking the antibiotics soon as she picks them up.  I informed her to keep taking her other medications as previously prescribed.  I discussed the findings with patient who voices understanding of discharge instructions, signs and symptoms requiring return to the ED; discharged improved and stable condition with follow-up for reevaluation.  Patient states she is ready for discharge at this time and verbalizes understanding of discharge instructions.    Patient is aware that discharge does not mean that nothing is wrong but it indicates no emergency is present and they must continue care with follow-up as given below or physician of their choice.    This document is intended for medical expert use only.  Reading of this document by patients and/or patient's family without participating medical staff guidance may result in misinterpretation and unintended morbidity.  Any interpretation of such data is the responsibility of the patient and/or family member responsible for the patient in concert with their primary or specialist providers, not to be left for sources of online search as such as [employer], [employer]era or similar queries.  Relying on these approaches to knowledge may result in misinterpretation, misguided goals of care and even death should patient or family members try recommendations outside of the realm of professional medical care in a supervised inpatient environment.        Amount and/or Complexity of Data Reviewed  Clinical lab tests: reviewed and ordered  Tests in the radiology section of CPT®: reviewed and ordered  Tests in the medicine section of CPT®: reviewed    Risk of Complications, Morbidity, and/or Mortality  Presenting problems: high  Diagnostic procedures: high  Management options: high    Patient Progress  Patient progress: improved      Final diagnoses:   Acute intractable tension-type headache   Acute UTI       ED Disposition  ED Disposition     ED Disposition   Discharge    Condition   Stable    Comment   --             Jeannette Powers MD  [facility] [facility] [place] [address] [phone]      As needed, If symptoms worsen    Olimpia Lopez MD  [address] [facility]  [address]  [place] [id number] [phone]               Medication List      New Prescriptions    nitrofurantoin (macrocrystal-monohydrate) 100 MG capsule  Commonly known as: MACROBID  Take 1 capsule by mouth 2 (Two) Times a Day for 5 days.           Where to Get Your Medications      These medications were sent to [facility] [address] [place] [address] - [address]  [phone]  - [phone] FX  [address] [place] [address]    Phone: [phone]   · nitrofurantoin (macrocrystal-monohydrate) 100 MG capsule          Benita Fu, APRN  06/09/22 0129

## 2022-06-09 NOTE — DISCHARGE INSTRUCTIONS
As discussed, take antibiotics as prescribed and be sure he finished entire course.  While taking antibiotics, increase probiotic intake through supplementation or foods such as yogurt.  Increase fluid intake and avoid dehydrating substances such as caffeine, coffee and tea.  Rest.    Follow-up with your neurologist for further management of headaches.  Follow-up with your primary care provider as needed.    Return to the ER for any new or worsening symptoms.

## 2022-06-10 LAB — QT INTERVAL: 406 MS

## 2022-07-07 DIAGNOSIS — G44.59 OTHER COMPLICATED HEADACHE SYNDROME: ICD-10-CM

## 2022-07-07 RX ORDER — NAPROXEN 500 MG/1
TABLET ORAL
Qty: 60 TABLET | Refills: 0 | OUTPATIENT
Start: 2022-07-07

## 2022-08-15 DIAGNOSIS — G44.59 OTHER COMPLICATED HEADACHE SYNDROME: ICD-10-CM

## 2022-08-15 RX ORDER — NAPROXEN 500 MG/1
250 TABLET ORAL 2 TIMES DAILY WITH MEALS
Qty: 60 TABLET | Refills: 5 | OUTPATIENT
Start: 2022-08-15

## 2022-08-15 NOTE — TELEPHONE ENCOUNTER
CALLED PT BACK. I ADVISED PT THAT SHE WOULD HAVE TO COMPLETE HER F/U APPT BEFORE OLIVIA COULD APPROVE OF NAPROXEN REFILL. I ADVISED PT THAT SHE CAN EITHER REQUEST REFILL FROM HER PCP OR PURCHASE MEDICATION OTC AT HER PREFERRED PHARMACY. PT VERBALIZED UNDERSTANDING.    DOCUMENTING PER HUB PROTOCOL.

## 2022-08-15 NOTE — TELEPHONE ENCOUNTER
Caller: Bee Magdalena M    Relationship: Self    Best call back number: [phone]    Requested Prescriptions:   Requested Prescriptions     Pending Prescriptions Disp Refills   • naproxen (Naprosyn) 500 MG tablet 60 tablet 5     Sig: Take 0.5 tablets by mouth 2 (Two) Times a Day With Meals. For headache      Pharmacy where request should be sent: [facility] - [place], [place] - [address] [facility] [phone] [place] [phone] FX     Additional details provided by patient: PT CALLED BACK TO CHECK STATUS OF REFILL REQUEST. I ADVISED WE ARE STILL WAITING ON PROVIDER APPROVAL, HOWEVER WITHOUT AN APPT, REFILL REQUEST WOULD BE DENIED SINCE IT HAS BEEN OVER 1 YEAR SINCE PT WAS LAST SEEN. PT AGREED TO SCHEDULE FOR F/U APPT ON 1/4/22 W/ OLIVIA. I ADVISED PT THAT I CAN NOT GUARANTEE THAT OLIVIA WILL APPROVE OF REQUEST EVEN WITH APPT SO SHE MAY HAVE TO CONSULT WITH HER PCP WHILE WAITING FOR HER APPT. PT VERBALIZED UNDERSTANDING.    Does the patient have less than a 3 day supply:  [x] Yes  [] No    Last office visit with prescribing clinician: 8/2/2021      Next office visit with prescribing clinician: 1/4/2023       PLEASE REVIEW AND ADVISE.    Petra Carmona Rep   08/15/22 10:21 EDT

## 2022-08-15 NOTE — TELEPHONE ENCOUNTER
Caller: MANUELITO MUSE  Relationship: SELF    Best call back number:  [phone]    Requested Prescriptions:   Requested Prescriptions      No prescriptions requested or ordered in this encounter        Pharmacy where request should be sent:  [facility]    Additional details provided by patient:  PT IS OUT OF THE MEDICATION naproxen (Naprosyn    Does the patient have less than a 3 day supply:  [x] Yes  [] No    Petra Perez Rep   08/15/22 08:38 EDT

## 2022-08-15 NOTE — TELEPHONE ENCOUNTER
PT HAS AN APPT ON 1/4/22. DOES PT NEED TO BE SEEN BEFORE DIXIE CANADA CAN APPROVE OF REFILL?     JUST WANTING TO CLARIFY BEFORE REACHING BACK OUT TO PT.    THANK YOU!

## 2023-01-03 NOTE — PROGRESS NOTES
Subjective: headaches    Patient ID: Magdalena Gamboa is a 62 y.o. female.    History of Present Illness Ms. Gamboa is a very pleasant 62-year-old  female who was initially seen in August 2021 post CVA.  The patient had developed post CVA headaches and was initially treated with prescription naproxen.  After being evaluated in neurology she was referred back to her PCP.      She states she is having 1 or 2 mild headaches a week and that her PCP will not write for naproxen.  Both the patient and her caregiver were notified that Aleve is over-the-counter and that she should not take it every day but could certainly take it 2-3 times per month with food for the headaches.    Post stroke: Dysphagia has improved, headaches are better, left arm and left hand weakness have continued, left leg weakness has improved however she is having trouble getting up out of chairs and states she has trouble with her stool at home and also getting out of chairs at home.  She states she has been seeing an orthopedic surgeon to evaluate her left shoulder.  Still has marked left facial droop.  Gait is very good.    She has not completed sleep testing and states that she does not need any sleep testing.    She has not stop smoking and was notified that this puts her at risk of a recurrent stroke.           The following portions of the patient's history were reviewed and updated as appropriate: allergies, current medications, past family history, past medical history, past social history, past surgical history and problem list.    Family History   Problem Relation Age of Onset   • Heart disease Sister        Past Medical History:   Diagnosis Date   • AMI (acute myocardial infarction) (HCC)    • Chronic back pain    • Mood disorder (HCC)    • Stroke (HCC)    • Substance abuse (HCC)        Social History     Socioeconomic History   • Marital status: Legally    Tobacco Use   • Smoking status: Every Day     Packs/day: 1.00      Types: Cigarettes   • Smokeless tobacco: Never   • Tobacco comments:     not interested   Vaping Use   • Vaping Use: Some days   • Substances: Nicotine   • Devices: Disposable, Pre-filled or refillable cartridge   Substance and Sexual Activity   • Alcohol use: Not Currently   • Drug use: Not Currently   • Sexual activity: Yes         Current Outpatient Medications:   •  albuterol sulfate  (90 Base) MCG/ACT inhaler, Inhale 2 puffs Every 4 (Four) Hours As Needed for Wheezing., Disp: , Rfl:   •  atorvastatin (LIPITOR) 80 MG tablet, Take 80 mg by mouth Every Night., Disp: , Rfl:   •  cefdinir (OMNICEF) 300 MG capsule, Take 300 mg by mouth 2 (Two) Times a Day., Disp: , Rfl:   •  clopidogrel (PLAVIX) 75 MG tablet, Take 1 tablet by mouth Daily., Disp: 30 tablet, Rfl:   •  doxycycline (MONODOX) 100 MG capsule, Take 100 mg by mouth 2 (Two) Times a Day., Disp: , Rfl:   •  gabapentin (NEURONTIN) 800 MG tablet, Take 800 mg by mouth 4 (Four) Times a Day., Disp: , Rfl:   •  nitrofurantoin, macrocrystal-monohydrate, (MACROBID) 100 MG capsule, Take 100 mg by mouth 2 (Two) Times a Day., Disp: , Rfl:   •  nortriptyline (PAMELOR) 25 MG capsule, TAKE 1 CAPSULE BY MOUTH AT BEDTIME FOR 30 DAYS, Disp: , Rfl:   •  sertraline (ZOLOFT) 50 MG tablet, Take 50 mg by mouth Daily., Disp: , Rfl:   •  umeclidinium-vilanterol (Anoro Ellipta) 62.5-25 MCG/INH aerosol powder  inhaler, Inhale 1 puff Daily., Disp: , Rfl:   •  busPIRone (BUSPAR) 10 MG tablet, Take 10 mg by mouth 3 (Three) Times a Day., Disp: , Rfl:   •  busPIRone (BUSPAR) 15 MG tablet, TAKE 1 TABLET BY MOUTH THREE TIMES DAILY (IN THE AM AFTERNOON BEDTIME) FOR ANXIETY RESTLESSNESS, Disp: , Rfl:   •  HYDROcodone-acetaminophen (NORCO) 5-325 MG per tablet, TAKE 1 TABLET BY MOUTH 4 TIMES DAILY AS NEEDED FOR PAIN, Disp: , Rfl:   •  naproxen (Naprosyn) 500 MG tablet, Take 0.5 tablets by mouth 2 (Two) Times a Day With Meals. For headache, Disp: 60 tablet, Rfl: 5  •  traMADol (ULTRAM) 50 MG  tablet, Take 50 mg by mouth 3 (Three) Times a Day As Needed., Disp: , Rfl:   •  verapamil SR (CALAN-SR) 120 MG CR tablet, Take 1 tablet by mouth Every Night., Disp: , Rfl:     Review of Systems   Constitutional: Negative for activity change and appetite change.   HENT: Negative.    Eyes: Negative.    Respiratory: Positive for cough.    Cardiovascular: Negative.    Gastrointestinal: Negative.    Endocrine: Negative.    Genitourinary: Negative.    Musculoskeletal: Negative.    Allergic/Immunologic: Negative.    Neurological: Negative.    Hematological: Negative.    Psychiatric/Behavioral: Negative.           I have reviewed ROS completed by medical assistant.     Objective:    Neurologic Exam     Mental Status   Oriented to person, place, and time.     Cranial Nerves     CN III, IV, VI   Pupils are equal, round, and reactive to light.    Gait, Coordination, and Reflexes     Gait  Gait: normal      Physical Exam  Vitals reviewed.   Constitutional:       Appearance: Normal appearance. She is normal weight.   HENT:      Head:        Right Ear: Tympanic membrane normal.      Left Ear: Tympanic membrane normal.      Nose: Nose normal.      Mouth/Throat:      Mouth: Mucous membranes are moist.   Eyes:      General: Lids are normal.      Pupils: Pupils are equal, round, and reactive to light.   Cardiovascular:      Rate and Rhythm: Normal rate.   Pulmonary:      Effort: Pulmonary effort is normal.   Musculoskeletal:      Comments: Right arm, right hand, right  5/5  Left arm, no  and weak  Bilateral legs 5/5     Skin:     General: Skin is warm and dry.   Neurological:      Mental Status: She is alert and oriented to person, place, and time.      Cranial Nerves: Cranial nerve deficit (Left facial droop ) and facial asymmetry (Left facial droop) present.      Gait: Gait is intact. Gait normal.   Psychiatric:         Attention and Perception: Attention and perception normal.         Mood and Affect: Mood normal.          Behavior: Behavior is cooperative.         Assessment/Plan:      Diagnoses and all orders for this visit:    1. Other complicated headache syndrome (Primary)  Comments:  The patient was encouraged to take over-the-counter Aleve as needed.  Take the medication with food.    2. H/O: CVA (cerebrovascular accident)  -     Ambulatory Referral to Physical Therapy Evaluate and treat (For assistive Devices )    PCP to control: BP<130/90, A1c<6.5, LDL<70, B12>500, control weight, exercise 20 min TIW, continue anticoagulant, continue statin, healthy heart diet. No smoking..   Sleep referral if needed.     Return if symptoms worsen or fail to improve.     I spent 31 minutes caring for Magdalena on this date of service. This time includes time spent by me in the following activities: reviewing tests, obtaining and/or reviewing a separately obtained history, performing a medically appropriate examination and/or evaluation, ordering medications, tests, or procedures and documenting information in the medical record.      This document has been electronically signed by LEIF Carrasco on January 4, 2023 14:36 EST

## 2023-01-04 ENCOUNTER — OFFICE VISIT (OUTPATIENT)
Dept: NEUROLOGY | Facility: CLINIC | Age: 63
End: 2023-01-04
Payer: MEDICARE

## 2023-01-04 VITALS
HEART RATE: 78 BPM | WEIGHT: 170 LBS | HEIGHT: 69 IN | BODY MASS INDEX: 25.18 KG/M2 | DIASTOLIC BLOOD PRESSURE: 82 MMHG | TEMPERATURE: 98.3 F | SYSTOLIC BLOOD PRESSURE: 134 MMHG

## 2023-01-04 DIAGNOSIS — G44.59 OTHER COMPLICATED HEADACHE SYNDROME: Primary | Chronic | ICD-10-CM

## 2023-01-04 DIAGNOSIS — Z86.73 H/O: CVA (CEREBROVASCULAR ACCIDENT): ICD-10-CM

## 2023-01-04 PROCEDURE — 1159F MED LIST DOCD IN RCRD: CPT | Performed by: NURSE PRACTITIONER

## 2023-01-04 PROCEDURE — 99214 OFFICE O/P EST MOD 30 MIN: CPT | Performed by: NURSE PRACTITIONER

## 2023-01-04 PROCEDURE — 1160F RVW MEDS BY RX/DR IN RCRD: CPT | Performed by: NURSE PRACTITIONER

## 2023-01-04 RX ORDER — NITROFURANTOIN 25; 75 MG/1; MG/1
100 CAPSULE ORAL 2 TIMES DAILY
COMMUNITY
Start: 2022-12-29

## 2023-01-04 RX ORDER — DOXYCYCLINE 100 MG/1
100 CAPSULE ORAL 2 TIMES DAILY
COMMUNITY
Start: 2022-11-28 | End: 2023-01-29

## 2023-01-04 RX ORDER — TRAMADOL HYDROCHLORIDE 50 MG/1
50 TABLET ORAL 3 TIMES DAILY PRN
COMMUNITY
Start: 2022-12-14

## 2023-01-29 ENCOUNTER — APPOINTMENT (OUTPATIENT)
Dept: GENERAL RADIOLOGY | Facility: HOSPITAL | Age: 63
End: 2023-01-29
Payer: MEDICARE

## 2023-01-29 ENCOUNTER — APPOINTMENT (OUTPATIENT)
Dept: CT IMAGING | Facility: HOSPITAL | Age: 63
End: 2023-01-29
Payer: MEDICARE

## 2023-01-29 ENCOUNTER — HOSPITAL ENCOUNTER (EMERGENCY)
Facility: HOSPITAL | Age: 63
Discharge: HOME | End: 2023-01-29
Admitting: EMERGENCY MEDICINE
Payer: MEDICARE

## 2023-01-29 VITALS
OXYGEN SATURATION: 95 % | RESPIRATION RATE: 16 BRPM | HEIGHT: 69 IN | DIASTOLIC BLOOD PRESSURE: 72 MMHG | WEIGHT: 170.64 LBS | TEMPERATURE: 98.4 F | HEART RATE: 62 BPM | SYSTOLIC BLOOD PRESSURE: 127 MMHG | BODY MASS INDEX: 25.27 KG/M2

## 2023-01-29 DIAGNOSIS — R51.9 NONINTRACTABLE HEADACHE, UNSPECIFIED CHRONICITY PATTERN, UNSPECIFIED HEADACHE TYPE: Primary | ICD-10-CM

## 2023-01-29 LAB
ANION GAP SERPL CALCULATED.3IONS-SCNC: 11 MMOL/L (ref 5–15)
B PARAPERT DNA SPEC QL NAA+PROBE: NOT DETECTED
B PERT DNA SPEC QL NAA+PROBE: NOT DETECTED
BASOPHILS # BLD AUTO: 0 10*3/MM3 (ref 0–0.2)
BASOPHILS NFR BLD AUTO: 0.2 % (ref 0–1.5)
BUN SERPL-MCNC: 19 MG/DL (ref 8–23)
BUN/CREAT SERPL: 24.1 (ref 7–25)
C PNEUM DNA NPH QL NAA+NON-PROBE: NOT DETECTED
CALCIUM SPEC-SCNC: 8.9 MG/DL (ref 8.6–10.5)
CHLORIDE SERPL-SCNC: 104 MMOL/L (ref 98–107)
CO2 SERPL-SCNC: 25 MMOL/L (ref 22–29)
CREAT SERPL-MCNC: 0.79 MG/DL (ref 0.57–1)
DEPRECATED RDW RBC AUTO: 48.1 FL (ref 37–54)
EGFRCR SERPLBLD CKD-EPI 2021: 84.7 ML/MIN/1.73
EOSINOPHIL # BLD AUTO: 0 10*3/MM3 (ref 0–0.4)
EOSINOPHIL NFR BLD AUTO: 0.1 % (ref 0.3–6.2)
ERYTHROCYTE [DISTWIDTH] IN BLOOD BY AUTOMATED COUNT: 14.4 % (ref 12.3–15.4)
FLUAV SUBTYP SPEC NAA+PROBE: NOT DETECTED
FLUBV RNA ISLT QL NAA+PROBE: NOT DETECTED
GLUCOSE SERPL-MCNC: 138 MG/DL (ref 65–99)
HADV DNA SPEC NAA+PROBE: NOT DETECTED
HCOV 229E RNA SPEC QL NAA+PROBE: NOT DETECTED
HCOV HKU1 RNA SPEC QL NAA+PROBE: NOT DETECTED
HCOV NL63 RNA SPEC QL NAA+PROBE: NOT DETECTED
HCOV OC43 RNA SPEC QL NAA+PROBE: NOT DETECTED
HCT VFR BLD AUTO: 37 % (ref 34–46.6)
HGB BLD-MCNC: 11.8 G/DL (ref 12–15.9)
HMPV RNA NPH QL NAA+NON-PROBE: NOT DETECTED
HPIV1 RNA ISLT QL NAA+PROBE: NOT DETECTED
HPIV2 RNA SPEC QL NAA+PROBE: NOT DETECTED
HPIV3 RNA NPH QL NAA+PROBE: NOT DETECTED
HPIV4 P GENE NPH QL NAA+PROBE: NOT DETECTED
LYMPHOCYTES # BLD AUTO: 1.1 10*3/MM3 (ref 0.7–3.1)
LYMPHOCYTES NFR BLD AUTO: 11.8 % (ref 19.6–45.3)
M PNEUMO IGG SER IA-ACNC: NOT DETECTED
MCH RBC QN AUTO: 30.7 PG (ref 26.6–33)
MCHC RBC AUTO-ENTMCNC: 31.9 G/DL (ref 31.5–35.7)
MCV RBC AUTO: 96.3 FL (ref 79–97)
MONOCYTES # BLD AUTO: 0.1 10*3/MM3 (ref 0.1–0.9)
MONOCYTES NFR BLD AUTO: 1.3 % (ref 5–12)
NEUTROPHILS NFR BLD AUTO: 8 10*3/MM3 (ref 1.7–7)
NEUTROPHILS NFR BLD AUTO: 86.6 % (ref 42.7–76)
NRBC BLD AUTO-RTO: 0.1 /100 WBC (ref 0–0.2)
PLATELET # BLD AUTO: 314 10*3/MM3 (ref 140–450)
PMV BLD AUTO: 8.5 FL (ref 6–12)
POTASSIUM SERPL-SCNC: 4.4 MMOL/L (ref 3.5–5.2)
RBC # BLD AUTO: 3.85 10*6/MM3 (ref 3.77–5.28)
RHINOVIRUS RNA SPEC NAA+PROBE: NOT DETECTED
RSV RNA NPH QL NAA+NON-PROBE: NOT DETECTED
SARS-COV-2 RNA NPH QL NAA+NON-PROBE: NOT DETECTED
SODIUM SERPL-SCNC: 140 MMOL/L (ref 136–145)
WBC NRBC COR # BLD: 9.2 10*3/MM3 (ref 3.4–10.8)

## 2023-01-29 PROCEDURE — 96372 THER/PROPH/DIAG INJ SC/IM: CPT

## 2023-01-29 PROCEDURE — 70450 CT HEAD/BRAIN W/O DYE: CPT

## 2023-01-29 PROCEDURE — 99284 EMERGENCY DEPT VISIT MOD MDM: CPT

## 2023-01-29 PROCEDURE — 85025 COMPLETE CBC W/AUTO DIFF WBC: CPT | Performed by: NURSE PRACTITIONER

## 2023-01-29 PROCEDURE — 0202U NFCT DS 22 TRGT SARS-COV-2: CPT | Performed by: NURSE PRACTITIONER

## 2023-01-29 PROCEDURE — 36415 COLL VENOUS BLD VENIPUNCTURE: CPT

## 2023-01-29 PROCEDURE — 96374 THER/PROPH/DIAG INJ IV PUSH: CPT

## 2023-01-29 PROCEDURE — 80048 BASIC METABOLIC PNL TOTAL CA: CPT | Performed by: NURSE PRACTITIONER

## 2023-01-29 PROCEDURE — 71045 X-RAY EXAM CHEST 1 VIEW: CPT

## 2023-01-29 PROCEDURE — 25010000002 ONDANSETRON PER 1 MG: Performed by: NURSE PRACTITIONER

## 2023-01-29 RX ORDER — ONDANSETRON 4 MG/1
4 TABLET, ORALLY DISINTEGRATING ORAL EVERY 8 HOURS PRN
Qty: 20 TABLET | Refills: 0 | Status: SHIPPED | OUTPATIENT
Start: 2023-01-29

## 2023-01-29 RX ORDER — SUMATRIPTAN 6 MG/.5ML
6 INJECTION, SOLUTION SUBCUTANEOUS ONCE
Status: COMPLETED | OUTPATIENT
Start: 2023-01-29 | End: 2023-01-29

## 2023-01-29 RX ORDER — SUMATRIPTAN 50 MG/1
25 TABLET, FILM COATED ORAL
Qty: 6 TABLET | Refills: 0 | Status: SHIPPED | OUTPATIENT
Start: 2023-01-29

## 2023-01-29 RX ORDER — ONDANSETRON 2 MG/ML
4 INJECTION INTRAMUSCULAR; INTRAVENOUS EVERY 6 HOURS PRN
Status: DISCONTINUED | OUTPATIENT
Start: 2023-01-29 | End: 2023-01-29 | Stop reason: HOSPADM

## 2023-01-29 RX ADMIN — SUMATRIPTAN 6 MG: 6 INJECTION, SOLUTION SUBCUTANEOUS at 15:21

## 2023-01-29 RX ADMIN — ONDANSETRON 4 MG: 2 INJECTION INTRAMUSCULAR; INTRAVENOUS at 15:30

## 2023-02-27 ENCOUNTER — TELEPHONE (OUTPATIENT)
Dept: NEUROLOGY | Facility: CLINIC | Age: 63
End: 2023-02-27
Payer: OTHER GOVERNMENT

## 2023-02-27 NOTE — TELEPHONE ENCOUNTER
Provider: DIXIE CANADA    Caller: Magdalena Gamboa    Relationship to Patient: Self    Pharmacy: [facility] - [place], [place] - [address] [phone] [facility] [phone]     Phone Number: [phone]    Reason for Call: PT HAS HAD AN INCREASE IN HEADACHES/MIGRIANES OVER THE PAST MONTH. PT WAS SEEN AT [facility] ED ON 1/29/23 AND HAS FOLLOWED UP WITH HER PCP IN THE MEANTIME. PCP RECOMMENDED SHE CONTACT NEUROLOGY.    When was the patient last seen: 1/4/23    When is the patient's next appointment: NO FUTURE APPTS    When did it start: 1/29/2023- WHEN PT WAS SEEN AT [facility] ED    Where is it located: STARTS AT THE BACK OF HEAD AND RADIATES TOWARDS HER TEMPLES    Characteristics of symptom/severity: THROBBING PAIN; RATES A 7-8/10 ON PAIN SCALE    Timing- Is it constant or intermittent: INTERMITTENT- PT DOES WAKE UP WITH HEADACHES EVERY OTHER DAY    What makes it worse: N/A    What makes it better: N/A    What therapies/medications have you tried: PT WAS PRESCRIBED IMITREX WHILE AT [facility] ED 1/29/23, HOWEVER, THIS HAS NOT BEEN BENEFICIAL IN ALLEVIATING HER MIGRAINES. PT IS ALSO TAKING ZOFRAN WHICH HAS HELPED WITH HER NAUSEA.    PT AWARE THAT SHE IS TO IMMEDIATELY REPORT TO THE NEAREST ED FOR FURTHER EVALUATION IS HER MIGRAINES WORSEN OR IF SHE DEVELOPS ANY OTHER STROKE-LIKE SYMPTOMS. PT VERBALIZED UNDERSTANDING.    PLEASE REVIEW AND ADVISE.

## 2023-02-28 ENCOUNTER — TELEPHONE (OUTPATIENT)
Dept: NEUROLOGY | Facility: CLINIC | Age: 63
End: 2023-02-28
Payer: OTHER GOVERNMENT

## 2023-02-28 DIAGNOSIS — G44.59 OTHER COMPLICATED HEADACHE SYNDROME: Primary | ICD-10-CM

## 2023-02-28 PROBLEM — F10.10 ALCOHOL ABUSE: Status: ACTIVE | Noted: 2023-02-28

## 2023-02-28 PROBLEM — G81.94 LEFT HEMIPLEGIA: Status: ACTIVE | Noted: 2023-02-28

## 2023-02-28 PROBLEM — F10.939 ALCOHOL WITHDRAWAL (HCC): Status: ACTIVE | Noted: 2023-02-28

## 2023-02-28 PROBLEM — Z86.73 CEREBRAL ARTERIOSCLEROSIS WITH HISTORY OF PREVIOUS STROKE: Status: ACTIVE | Noted: 2023-02-28

## 2023-02-28 PROBLEM — E83.42 HYPOMAGNESEMIA: Status: ACTIVE | Noted: 2023-02-28

## 2023-02-28 PROBLEM — I67.2 CEREBRAL ARTERIOSCLEROSIS WITH HISTORY OF PREVIOUS STROKE: Status: ACTIVE | Noted: 2023-02-28

## 2023-02-28 NOTE — TELEPHONE ENCOUNTER
Caller: MANUELITO  Relationship to Patient: SELF  Phone Number: [phone]     Reason for Call: PT STATES SHE FORGOT TO TELL PROVIDER THAT SHE CURRENTLY HAS MIGRAINE SINCE WAKING UP TODAY.     I ALSO SET UP FIRST AVAILABLE APPOINTMENT FOR 5-8-23 @ 1PM

## 2023-02-28 NOTE — TELEPHONE ENCOUNTER
The patient had called the clinic stating that she was having an daily headache.  She reports it occurs every other day starts at the back of her head and proceeds to bitemporal.  She states that it will last the rest of the day.  She states she has nausea with it and has been prescribed Zofran which works well.  She denies any light sensitivity or sound sensitivity which are usually associated with a migraine type disorder.  She has had a trial on sumatriptan that was not beneficial.      In regard to prescribing the patient something for prevention, it is extremely difficult.  She is on verapamil so Inderal is contraindicated.  She has a history of kidney stones so Topamax is contraindicated.  She has a history of stroke and cardiovascular disease so any triptan is contraindicated.      I told the patient that I would give her a trial with Nurtec.  She is to call the clinic if this medication is beneficial.  Nurtec is a different drug and does not cause increased cardiovascular risk.      The patient is also overdue for a follow-up visit.    She was again cautioned not to take Tylenol, Excedrin, high-dose aspirin or Advil as it can cause rebound headache syndrome.

## 2023-02-28 NOTE — TELEPHONE ENCOUNTER
Ms. Gamboa is a very pleasant 62-year-old  female who was seen in this clinic post CVA.  At that time she was having some mild post stroke headaches.  She states the headaches have progressively gotten worse and are occurring every other day.  She states they have a duration of 6 to 8 hours and are associated with nausea.  She denies any light or sound sensitivity with these headaches.  She states they start in the back of her head and radiate forward into both temples.    She has had a trial on Imitrex per her PCP.  She has a history of atherosclerotic heart disease which makes triptans contraindicated.  She also states that the triptan was not beneficial.    I discussed a trial with Inderal LA 60 mg once a day for headache prevention and a trial with Nurtec 75 mg for headache.  She is to phone back after she has had a trial with both medications to let us know if they have helped.    She is to discontinue all over-the-counter pain medications such as Tylenol Advil Excedrin Migraine or aspirin high-dose due to the fact that they will cause rebound headache.    Again this is a headache that is mixed it is not a straightforward migraine type of headache.    Current meds to be ordered:  Inderal LA 60 mg daily  Nurtec 75 mg as needed with max dose   QOD, 3 days a week.      She does need a follow-up in the neurology clinic.

## 2023-03-03 ENCOUNTER — TELEPHONE (OUTPATIENT)
Dept: NEUROLOGY | Facility: CLINIC | Age: 63
End: 2023-03-03
Payer: OTHER GOVERNMENT

## 2023-03-03 NOTE — TELEPHONE ENCOUNTER
Caller: MANUELITO Chavira call back number: [phone]    What was the call regarding: PT SAID RX YOU SENT IN FOR HER THE PHARMACY WONT GET IN FOR TWO WEEKS, ON BACKORDER ? CAN YOU CALL SOMETHING ELSE IN ?     Do you require a callback: YES     PLEASE ADVISE

## 2023-03-20 ENCOUNTER — TELEPHONE (OUTPATIENT)
Dept: NEUROLOGY | Facility: CLINIC | Age: 63
End: 2023-03-20
Payer: OTHER GOVERNMENT

## 2023-03-20 DIAGNOSIS — G44.59 OTHER COMPLICATED HEADACHE SYNDROME: Primary | ICD-10-CM

## 2023-03-20 RX ORDER — GABAPENTIN 300 MG/1
CAPSULE ORAL
Qty: 90 CAPSULE | Refills: 5 | Status: SHIPPED | OUTPATIENT
Start: 2023-03-20

## 2023-03-20 NOTE — TELEPHONE ENCOUNTER
Please notify the patient that will be ordering an MRI of the brain to be compared to previous as she is having atypical headaches.

## 2023-03-22 ENCOUNTER — TELEPHONE (OUTPATIENT)
Dept: NEUROLOGY | Facility: CLINIC | Age: 63
End: 2023-03-22
Payer: OTHER GOVERNMENT

## 2023-03-22 NOTE — TELEPHONE ENCOUNTER
Left detail message provider is ordering MRI to compare to other MRI due to her headaches.  F will contact her

## 2023-03-23 ENCOUNTER — TELEPHONE (OUTPATIENT)
Dept: NEUROLOGY | Facility: CLINIC | Age: 63
End: 2023-03-23

## 2023-03-23 NOTE — TELEPHONE ENCOUNTER
She is having headache every day  She is currently taking rotech and taking ibuprofen nothing is helping. I advised her if she is getting no relief to go to the ER, she will call her PCP about feeling drained.  She is aware she will be getting a call from [facility] for the MRI she is aware you are out of the office until monday

## 2023-03-23 NOTE — TELEPHONE ENCOUNTER
Provider: YANICK  Caller: MANUELITO  Relationship to Patient: SELF  Phone Number: [phone]  Reason for Call: PT IS STILL HAVING HEADACHES AND FEELS DRAINED. PT IS REQUESTING A CALL BACK     PLEASE REVIEW AND ADVISE    THANK YOU

## 2023-03-24 NOTE — TELEPHONE ENCOUNTER
Caller: MANUELITO  Relationship to Patient: SELF  Phone Number: [phone]    Reason for Call: PT IS CLAUSTROPHOBIC AND WONDERING IF A MEDICATION CAN BE CALLED IN FOR NERVES SO SHE CAN COMPLETE THE MRI SCHEDULED 4-19-23

## 2023-03-27 DIAGNOSIS — G44.59 OTHER COMPLICATED HEADACHE SYNDROME: Primary | ICD-10-CM

## 2023-03-27 RX ORDER — DIAZEPAM 5 MG/1
TABLET ORAL
Qty: 2 TABLET | Refills: 0 | Status: SHIPPED | OUTPATIENT
Start: 2023-03-27

## 2023-04-11 ENCOUNTER — TELEPHONE (OUTPATIENT)
Dept: NEUROLOGY | Facility: CLINIC | Age: 63
End: 2023-04-11

## 2023-04-11 NOTE — TELEPHONE ENCOUNTER
Tell the patient to come to the emergency room or to go to an urgent care clinic and see if they can give her a injection for this headache I do not have anything like that here.

## 2023-04-11 NOTE — TELEPHONE ENCOUNTER
Provider: OLIVIA HERNDON APRN    Caller: MANUELITO    Relationship to Patient: SELF    Phone Number: [phone]    Reason for Call: PT CALLING REGARDING MESSAGE.   STATED THE HA ARE COMING ON EVERY DAY BAD.  STATED SHE IS GOING TO HAVE THE MRI ON 4-19-23.  CALLED S/W NAVI AT CLINIC.  ADVISED TO LET PT KNOW IT IS STILL IN PROGRESS AND SHE WILL CALL HER BACK.  PT V/U

## 2023-04-11 NOTE — TELEPHONE ENCOUNTER
The patient was seen for stroke follow-up only and was referred back to her primary care physician.  If she is to be seen for migraine she will need a referral and complete migraine work-up.  She can proceed to the emergency room or urgent care and they may be able to give her an injection to stop this current migraine.

## 2023-04-11 NOTE — TELEPHONE ENCOUNTER
PT CALLING TO ADVISE SHE HAS HAD A DAILY MIGRAINE FOR THE LAST TWO MONTHS.    PT ASKING FOR SOMETHING TO GIVE HER RELIEF    PLEASE ADVISE PATIENT    THANK YOU

## 2023-04-12 NOTE — TELEPHONE ENCOUNTER
Provider: OLIVIA HERNDON APRN    Phone Number: [phone]    Reason for Call: CALLED S/W PATIENT TO LETTING HER KNOW THAT PROVIDER WANTS HER TO GO TO THE ER OR URGENT CARE.  PT DID NOT GO TO THE ER YESTERDAY (4-12-23).  PT STATED SHE WILL EITHER GO TO [facility] OR [facility] IF SHE GOES.    DOCUMENTING PER [employer].

## 2023-04-13 NOTE — TELEPHONE ENCOUNTER
PT CALLED TO ADVISE SHE WENT TO ED YESTERDAY AND HAD A SHOT FOR MIGRAINE.  SHE STATES THIS DID PROVIDE RELIEF FOR YESTERDAY.  PT WOKE UP THIS MORNING WITH ANOTHER MIGRAINE.    PLEASE CONTACT PATIENT AND ADVISE NEXT STEPS    PT STATES SHE CAN'T CONTINUE THIS WAY.

## 2023-04-13 NOTE — TELEPHONE ENCOUNTER
PT CALLED AGAIN. TOLD HER THE MESSAGE WAS SENT TO OLIVIA.    PT SAID SHE STILL HAS THE MIGRAINE AND IS HOPING TO GET RX TO HELP.

## 2023-04-13 NOTE — TELEPHONE ENCOUNTER
Provider: OLIVIA HERNDON APRN     Caller: MANUELITO     Relationship to Patient: SELF     Phone Number: [phone]     Reason for Call: PT CALLING AGAIN TO SEE IF SHE CAN BE GIVEN RX TO HELP WITH HER MIGRAINE.    PT USES [facility] IN [place].    PT HAS AN MRI SCHEDULED FOR 4-19-23.    PT'S EVON [facility]/OLIVIA IS NOT UNTIL 5-8-23.    PLEASE CALL THE PT TO LET HER KNOW WHAT SHE CAN DO FOR THE MIGRAINE PAIN AS THE PT HAS ALREADY GONE TO THE ER ON 4-12-23 AT [facility].

## 2023-04-17 DIAGNOSIS — G44.59 OTHER COMPLICATED HEADACHE SYNDROME: ICD-10-CM

## 2023-04-17 NOTE — TELEPHONE ENCOUNTER
Does Nurtec not help? Triptan are contraindicated.   Is she taking the Gabapentin?   She needs to go to Urgent care or ED for injection to break Migraine Cycle. Too many days in a row.

## 2023-04-17 NOTE — TELEPHONE ENCOUNTER
She said the Nurtec did not help  She is currently taking the gabapentin  Informed her to go to Urgent care and ED

## 2023-04-19 ENCOUNTER — HOSPITAL ENCOUNTER (OUTPATIENT)
Dept: MRI IMAGING | Facility: HOSPITAL | Age: 63
Discharge: HOME | End: 2023-04-19
Admitting: NURSE PRACTITIONER
Payer: MEDICARE

## 2023-04-19 DIAGNOSIS — G44.59 OTHER COMPLICATED HEADACHE SYNDROME: ICD-10-CM

## 2023-04-19 LAB
CREAT BLDA-MCNC: 0.9 MG/DL (ref 0.6–1.3)
EGFRCR SERPLBLD CKD-EPI 2021: 72.4 ML/MIN/1.73

## 2023-04-19 PROCEDURE — A9579 GAD-BASE MR CONTRAST NOS,1ML: HCPCS | Performed by: NURSE PRACTITIONER

## 2023-04-19 PROCEDURE — 25010000002 GADOTERIDOL PER 1 ML: Performed by: NURSE PRACTITIONER

## 2023-04-19 PROCEDURE — 70553 MRI BRAIN STEM W/O & W/DYE: CPT

## 2023-04-19 PROCEDURE — 82565 ASSAY OF CREATININE: CPT

## 2023-04-19 RX ADMIN — GADOTERIDOL 15 ML: 279.3 INJECTION, SOLUTION INTRAVENOUS at 15:19

## 2023-04-20 ENCOUNTER — TELEPHONE (OUTPATIENT)
Dept: NEUROLOGY | Facility: CLINIC | Age: 63
End: 2023-04-20

## 2023-04-20 NOTE — TELEPHONE ENCOUNTER
Provider: YANICK  Caller: MANUELITO  Phone Number: [phone]  Reason for Call:PT CALLED AND WANTED TO LET PROVIDER KNOW THAT MRI WAS COMPLETED YESTERDAY 04/19/23 AND  PT STILL HAS A MIGRAINE. PT STATES SHE HAS HAD A MIGRAINE EVERYDAY FOR THE LAST COUPLE OF WEEKS. PT WOULD LIKE TO KNOW IF THERE IS ANYTHING THAT CAN BE DONE TO HELP WITH MIGRAINE.    PLEASE REVIEW AND ADVISE.  THANK YOU

## 2023-04-24 ENCOUNTER — TELEPHONE (OUTPATIENT)
Dept: NEUROLOGY | Facility: CLINIC | Age: 63
End: 2023-04-24

## 2023-04-24 NOTE — TELEPHONE ENCOUNTER
Please tell her that the pain clinic appointments would only be every 3 months.  As far as her migraines they can be related to history of stroke, family history or hormones.  Sometimes they are what is called idiopathic or there is no known cause for the migraines.

## 2023-04-24 NOTE — TELEPHONE ENCOUNTER
She was informed of the MRI results.    She is currently taking gabapentin qid  She does not have a vehicle to go to a pain clinic.  She is taking nurtec one every other day so far.  Wants to know why she is having these migraines so bad

## 2023-04-24 NOTE — TELEPHONE ENCOUNTER
Caller: Magdalena Gamboa    Relationship: Self    Best call back number: [phone]    Caller requesting test results: MRI    What test was performed: MRI     When was the test performed: 4/19/23    Where was the test performed: [facility]    Additional notes: PT CALLED TO GET THE RESULTS OF HER MRI. SHE SAID SHE'S STILL HAVING BAD HEADACHES AND HER MOUTH IS STILL NUMB.

## 2023-04-24 NOTE — PROGRESS NOTES
Please call the patient regarding her result.      Tell the patient that it does show some expected changes from her MCA territory stroke but no other ischemia.  No intracranial hemorrhage, no mass.  Orbits are normal paranasal sinuses are clear.  No cause for her headaches.    MRI   IMPRESSION:  Impression:  Stable evolving volume loss from prior right MCA territory infarct, with some evidence of additional anterior DESIREE territory ischemia. There is no evidence of acute ischemia, hemorrhage, mass or mass effect otherwise.   Electronically Signed: José Miguel Coleman    4/21/2023 1:18 AM EDT    Workstation ID: [id number]  Poxel

## 2023-04-24 NOTE — TELEPHONE ENCOUNTER
Tell the patient that she can go ahead and increase the gabapentin to 3 times a day.  She is not to take more than 2 doses of the rescue medicine per week.  The patient may be a candidate for Botox for chronic migraine.  Tell her to review [email or web address].  If she is interested I can send her to the pain clinic and they can proceed with the treatments.  Other options are a injectable medication once per month.  I think we are limited on other oral preventatives.

## 2023-04-26 DIAGNOSIS — G44.59 OTHER COMPLICATED HEADACHE SYNDROME: Primary | ICD-10-CM

## 2023-04-26 NOTE — TELEPHONE ENCOUNTER
Provider: OLIVIA HERNDON APRN    Caller: MANUELITO    Relationship to Patient: SELF    Phone Number: [phone]    Reason for Call: PT CALLING TO LET PROVIDER KNOW SHE IS STILL HAVING THE MIGRAINES.  STATED PT IS TAKING THE GABAPENTIN AND NURTEC. PT WOULD LIKE TO KNOW WHAT ELSE PROVIDER CAN DO FOR HER.     PLEASE CALL & ADVSIE

## 2023-05-04 NOTE — PROGRESS NOTES
Subjective: Complicated migraine and post stroke headaches    Patient ID: Magdalena Gamboa is a 62 y.o. female.    History of Present Illness Ms. Gamboa is a very pleasant 62-year-old  female who presented with her sister for follow-up on migraine and daily headaches.  The patient has had trials on several triptans that were not efficient.  Today she reports that when she has a migraine she has numbness in her mouth which makes her migraine a complicated migraine and triptans are contraindicated.  The patient states she had tried Nurtec in the past and was possibly interested in trying it again.  She states that she sort of is interested in Botox but has not decided fully.  She reports she does take Tylenol but states only 2 days a week and never over the recommended dosing schedule.  She was notified of the syndrome of rebound headache which is caused by Tylenol Excedrin Migraine aspirin or Advil taken on a daily basis.    The patient states that her migraines are at the back of her head and reports her other headaches are at the front of her head.    Current choices for the patient are: 1.  Use Nurtec for migraine                                                            2.  Undergo Botox for chronic migraine                                                           3.  Be referred to a headache center for evaluation                  MRI BRAIN W WO CONTRAST   Date of Exam: 4/19/2023 2:30 PM EDT   Indication: Atypical Headache.   Comparison: CT 1/29/2023  IMPRESSION:  Impression:  Stable evolving volume loss from prior right MCA territory infarct, with some evidence of additional anterior DESIREE territory ischemia. There is no evidence of acute ischemia, hemorrhage, mass or mass effect otherwise.     Electronically Signed: José Miguel Coleman    4/21/2023 1:18 AM EDT     .     The following portions of the patient's history were reviewed and updated as appropriate: allergies, current medications, past family  history, past medical history, past social history, past surgical history and problem list.    Family History   Problem Relation Age of Onset   • Heart disease Sister        Past Medical History:   Diagnosis Date   • AMI (acute myocardial infarction)    • Chronic back pain    • Mood disorder    • Stroke    • Substance abuse        Social History     Socioeconomic History   • Marital status: Legally    Tobacco Use   • Smoking status: Every Day     Packs/day: 1.00     Types: Cigarettes   • Smokeless tobacco: Never   • Tobacco comments:     not interested   Vaping Use   • Vaping Use: Some days   • Substances: Nicotine   • Devices: Disposable, Pre-filled or refillable cartridge   Substance and Sexual Activity   • Alcohol use: Not Currently   • Drug use: Not Currently   • Sexual activity: Yes         Current Outpatient Medications:   •  albuterol sulfate  (90 Base) MCG/ACT inhaler, Inhale 2 puffs Every 4 (Four) Hours As Needed for Wheezing., Disp: , Rfl:   •  atorvastatin (LIPITOR) 80 MG tablet, Take 1 tablet by mouth Every Night., Disp: , Rfl:   •  busPIRone (BUSPAR) 10 MG tablet, Take 1 tablet by mouth 3 (Three) Times a Day., Disp: , Rfl:   •  busPIRone (BUSPAR) 15 MG tablet, TAKE 1 TABLET BY MOUTH THREE TIMES DAILY (IN THE AM AFTERNOON BEDTIME) FOR ANXIETY RESTLESSNESS, Disp: , Rfl:   •  clopidogrel (PLAVIX) 75 MG tablet, Take 1 tablet by mouth Daily., Disp: 30 tablet, Rfl:   •  diazePAM (Valium) 5 MG tablet, Bring to MRI, Will need a , Do not drive taking this medication, Disp: 2 tablet, Rfl: 0  •  gabapentin (NEURONTIN) 300 MG capsule, Wk1: take 1 tab at bedtime, Week2: take 1 tab twice a day, Wk3: take 1 tab three times a day., Disp: 90 capsule, Rfl: 5  •  gabapentin (NEURONTIN) 800 MG tablet, Take 1 tablet by mouth 4 (Four) Times a Day., Disp: , Rfl:   •  HYDROcodone-acetaminophen (NORCO) 5-325 MG per tablet, TAKE 1 TABLET BY MOUTH 4 TIMES DAILY AS NEEDED FOR PAIN, Disp: , Rfl:   •   nitrofurantoin, macrocrystal-monohydrate, (MACROBID) 100 MG capsule, Take 1 capsule by mouth 2 (Two) Times a Day., Disp: , Rfl:   •  nortriptyline (PAMELOR) 25 MG capsule, TAKE 1 CAPSULE BY MOUTH AT BEDTIME FOR 30 DAYS, Disp: , Rfl:   •  ondansetron ODT (ZOFRAN-ODT) 4 MG disintegrating tablet, Place 1 tablet on the tongue Every 8 (Eight) Hours As Needed for Vomiting., Disp: 20 tablet, Rfl: 0  •  sertraline (ZOLOFT) 50 MG tablet, Take 1 tablet by mouth Daily., Disp: , Rfl:   •  traMADol (ULTRAM) 50 MG tablet, Take 1 tablet by mouth 3 (Three) Times a Day As Needed., Disp: , Rfl:   •  umeclidinium-vilanterol (Anoro Ellipta) 62.5-25 MCG/INH aerosol powder  inhaler, Inhale 1 puff Daily., Disp: , Rfl:   •  verapamil SR (CALAN-SR) 120 MG CR tablet, Take 1 tablet by mouth Every Night., Disp: , Rfl:     Review of Systems   Neurological: Positive for headaches.   All other systems reviewed and are negative.         I have reviewed ROS completed by medical assistant.     Objective:    Neurologic Exam     Mental Status   Speech: speech is normal       Physical Exam  Vitals reviewed.   Constitutional:       Appearance: Normal appearance.   HENT:      Head: Normocephalic and atraumatic.        Nose: Nose normal.      Mouth/Throat:      Mouth: Mucous membranes are moist.   Eyes:      General: Lids are normal.   Cardiovascular:      Rate and Rhythm: Normal rate.   Pulmonary:      Effort: Pulmonary effort is normal.   Musculoskeletal:         General: Normal range of motion.      Cervical back: Normal range of motion.   Skin:     General: Skin is warm and dry.   Neurological:      Mental Status: She is alert.   Psychiatric:         Mood and Affect: Mood normal.         Speech: Speech normal.         Behavior: Behavior normal. Behavior is cooperative.         Assessment/Plan:   I discussed with the patient and her sister a possible trial with Botox for chronic migraine.  She was shown the areas where the injections were placed and  the frequency and side effects were discussed.  The patient states she would like to defer Botox treatment at this time.  She reported that she would like another trial with Nurtec.  She was notified that she can only take Nurtec every 48 hours.  The patient was given some samples to try again.  She was notified not to take Tylenol, Excedrin Migraine, Advil, or aspirin, on a daily basis as it can cause rebound headaches.  The patient did not want any treatment for daily headache at this time.    Diagnoses and all orders for this visit:    1. Migraine without aura and with status migrainosus, not intractable (Primary)    2. Chronic daily headache          Return in about 6 months (around 11/8/2023) for  , Next scheduled follow up Aydee Powers.     I spent 28 minutes caring for Magdalena on this date of service. This time includes time spent by me in the following activities: reviewing tests, obtaining and/or reviewing a separately obtained history, performing a medically appropriate examination and/or evaluation, counseling and educating the patient/family/caregiver, ordering medications, tests, or procedures and documenting information in the medical record.      This document has been electronically signed by LEIF Carrasco on May 8, 2023 14:54 EDT

## 2023-05-08 ENCOUNTER — OFFICE VISIT (OUTPATIENT)
Dept: NEUROLOGY | Facility: CLINIC | Age: 63
End: 2023-05-08
Payer: OTHER GOVERNMENT

## 2023-05-08 VITALS
BODY MASS INDEX: 26.07 KG/M2 | DIASTOLIC BLOOD PRESSURE: 83 MMHG | WEIGHT: 176 LBS | HEART RATE: 81 BPM | HEIGHT: 69 IN | SYSTOLIC BLOOD PRESSURE: 133 MMHG

## 2023-05-08 DIAGNOSIS — G43.001 MIGRAINE WITHOUT AURA AND WITH STATUS MIGRAINOSUS, NOT INTRACTABLE: Primary | ICD-10-CM

## 2023-05-08 DIAGNOSIS — R51.9 CHRONIC DAILY HEADACHE: ICD-10-CM

## 2023-05-08 PROCEDURE — 99213 OFFICE O/P EST LOW 20 MIN: CPT | Performed by: NURSE PRACTITIONER

## 2023-11-03 NOTE — PROGRESS NOTES
Subjective: Complicated migraine post CVA    Patient ID: Magdalena Gamboa is a 63 y.o. female.    History of Present Illness Ms. Gamboa is a very pleasant 62-year-old  female who presented with her sister for follow-up on migraine and daily headaches.  She has been dosing Nurtec every other day and states it completely controls her migraines.  She would like to continue this therapy.  She states she does have some nausea with migraine and would like Zofran as she has had an allergic reaction possibly to Phenergan.  She also reports that she has much weakness in her right hand and would like to go back into some occupational therapy to see if she could strengthen up more.    She is not interested in Botox at this time.      The following portions of the patient's history were reviewed and updated as appropriate: allergies, current medications, past family history, past medical history, past social history, past surgical history and problem list.    Family History   Problem Relation Age of Onset    Heart disease Sister        Past Medical History:   Diagnosis Date    AMI (acute myocardial infarction)     Chronic back pain     Mood disorder     Stroke     Substance abuse        Social History     Socioeconomic History    Marital status: Legally    Tobacco Use    Smoking status: Every Day     Packs/day: 1     Types: Cigarettes    Smokeless tobacco: Never    Tobacco comments:     not interested   Vaping Use    Vaping Use: Some days    Substances: Nicotine    Devices: Disposable, Pre-filled or refillable cartridge   Substance and Sexual Activity    Alcohol use: Not Currently    Drug use: Not Currently    Sexual activity: Yes         Current Outpatient Medications:     albuterol sulfate  (90 Base) MCG/ACT inhaler, Inhale 2 puffs Every 4 (Four) Hours As Needed for Wheezing., Disp: , Rfl:     atorvastatin (LIPITOR) 80 MG tablet, Take 1 tablet by mouth Every Night., Disp: , Rfl:     busPIRone (BUSPAR)  10 MG tablet, Take 1 tablet by mouth 3 (Three) Times a Day., Disp: , Rfl:     busPIRone (BUSPAR) 15 MG tablet, TAKE 1 TABLET BY MOUTH THREE TIMES DAILY (IN THE AM AFTERNOON BEDTIME) FOR ANXIETY RESTLESSNESS, Disp: , Rfl:     clopidogrel (PLAVIX) 75 MG tablet, Take 1 tablet by mouth Daily., Disp: 30 tablet, Rfl:     gabapentin (NEURONTIN) 300 MG capsule, Wk1: take 1 tab at bedtime, Week2: take 1 tab twice a day, Wk3: take 1 tab three times a day., Disp: 90 capsule, Rfl: 5    gabapentin (NEURONTIN) 800 MG tablet, Take 1 tablet by mouth 4 (Four) Times a Day., Disp: , Rfl:     HYDROcodone-acetaminophen (NORCO) 5-325 MG per tablet, TAKE 1 TABLET BY MOUTH 4 TIMES DAILY AS NEEDED FOR PAIN, Disp: , Rfl:     nitrofurantoin, macrocrystal-monohydrate, (MACROBID) 100 MG capsule, Take 1 capsule by mouth 2 (Two) Times a Day., Disp: , Rfl:     nortriptyline (PAMELOR) 25 MG capsule, TAKE 1 CAPSULE BY MOUTH AT BEDTIME FOR 30 DAYS, Disp: , Rfl:     Nurtec 75 MG dispersible tablet, Take 1 tablet by mouth Every Other Day., Disp: 30 tablet, Rfl: 11    sertraline (ZOLOFT) 50 MG tablet, Take 1 tablet by mouth Daily., Disp: , Rfl:     umeclidinium-vilanterol (Anoro Ellipta) 62.5-25 MCG/INH aerosol powder  inhaler, Inhale 1 puff Daily., Disp: , Rfl:     verapamil SR (CALAN-SR) 120 MG CR tablet, Take 1 tablet by mouth Every Night., Disp: , Rfl:     ondansetron ODT (ZOFRAN-ODT) 8 MG disintegrating tablet, Place 1 tablet on the tongue Every 12 (Twelve) Hours As Needed for Nausea or Vomiting., Disp: 6 tablet, Rfl: 5    Review of Systems   Neurological:  Positive for headaches.   All other systems reviewed and are negative.         I have reviewed ROS completed by medical assistant.     Objective:    Neurologic Exam     Mental Status   Oriented to person, place, and time.       Physical Exam  Vitals reviewed.   Constitutional:       Appearance: Normal appearance. She is well-groomed and normal weight.   HENT:      Head: Normocephalic and  atraumatic.        Right Ear: Hearing normal.      Left Ear: Hearing normal.      Nose: Nose normal.      Mouth/Throat:      Lips: Pink.      Mouth: Mucous membranes are moist.     Eyes:      General: Lids are normal.   Cardiovascular:      Rate and Rhythm: Normal rate.   Pulmonary:      Effort: Pulmonary effort is normal.   Musculoskeletal:         General: Normal range of motion.        Arms:       Cervical back: Full passive range of motion without pain and normal range of motion.   Skin:     General: Skin is warm and dry.   Neurological:      Mental Status: She is alert and oriented to person, place, and time.      Cranial Nerves: Facial asymmetry (Left facial droop continued) present. No dysarthria.      Motor: Weakness (Left hand weakness) present.      Gait: Gait normal.   Psychiatric:         Attention and Perception: Attention and perception normal.         Mood and Affect: Mood normal.         Behavior: Behavior is cooperative.       Assessment/Plan:  The patient will be reordered Nurtec every other day as it has worked very well for her atypical migraines.  She was also ordered Zofran ODT 8 mg every 12 hours for nausea and was warned that it can cause constipation to stop using if she has constipation.  The patient will be referred back to occupational therapy at [facility] for treatment of weakness in the left hand.  She will be scheduled back for follow-up in 6 months and was told to call with any questions or concerns or change in headache/migraine.      Diagnoses and all orders for this visit:    1. Migraine without aura and with status migrainosus, not intractable (Primary)  -     Nurtec 75 MG dispersible tablet; Take 1 tablet by mouth Every Other Day.  Dispense: 30 tablet; Refill: 11    2. Nausea and vomiting, unspecified vomiting type  -     ondansetron ODT (ZOFRAN-ODT) 8 MG disintegrating tablet; Place 1 tablet on the tongue Every 12 (Twelve) Hours As Needed for Nausea or  Vomiting.  Dispense: 6 tablet; Refill: 5    3. H/O: CVA (cerebrovascular accident)  -     Ambulatory Referral to Occupational Therapy          Return in about 6 months (around 5/7/2024).     I spent 30 minutes caring for Magdalena on this date of service. This time includes time spent by me in the following activities: obtaining and/or reviewing a separately obtained history, performing a medically appropriate examination and/or evaluation, counseling and educating the patient/family/caregiver, ordering medications, tests, or procedures, referring and communicating with other health care professionals, and documenting information in the medical record.      This document has been electronically signed by LEIF Carrasco on November 7, 2023 13:31 EST

## 2023-11-07 ENCOUNTER — OFFICE VISIT (OUTPATIENT)
Dept: NEUROLOGY | Facility: CLINIC | Age: 63
End: 2023-11-07
Payer: MEDICARE

## 2023-11-07 VITALS
HEIGHT: 69 IN | HEART RATE: 76 BPM | SYSTOLIC BLOOD PRESSURE: 122 MMHG | BODY MASS INDEX: 27.7 KG/M2 | DIASTOLIC BLOOD PRESSURE: 80 MMHG | WEIGHT: 187 LBS

## 2023-11-07 DIAGNOSIS — R11.2 NAUSEA AND VOMITING, UNSPECIFIED VOMITING TYPE: ICD-10-CM

## 2023-11-07 DIAGNOSIS — Z86.73 H/O: CVA (CEREBROVASCULAR ACCIDENT): ICD-10-CM

## 2023-11-07 DIAGNOSIS — G43.001 MIGRAINE WITHOUT AURA AND WITH STATUS MIGRAINOSUS, NOT INTRACTABLE: Primary | ICD-10-CM

## 2023-11-07 PROBLEM — H52.4 PRESBYOPIA: Status: ACTIVE | Noted: 2022-08-12

## 2023-11-07 PROBLEM — Z96.1 PRESENCE OF INTRAOCULAR LENS: Status: ACTIVE | Noted: 2022-08-12

## 2023-11-07 PROBLEM — H52.03 HYPERMETROPIA, BILATERAL: Status: ACTIVE | Noted: 2022-08-12

## 2023-11-07 PROCEDURE — 3079F DIAST BP 80-89 MM HG: CPT | Performed by: NURSE PRACTITIONER

## 2023-11-07 PROCEDURE — 99214 OFFICE O/P EST MOD 30 MIN: CPT | Performed by: NURSE PRACTITIONER

## 2023-11-07 PROCEDURE — 3074F SYST BP LT 130 MM HG: CPT | Performed by: NURSE PRACTITIONER

## 2023-11-07 PROCEDURE — 1160F RVW MEDS BY RX/DR IN RCRD: CPT | Performed by: NURSE PRACTITIONER

## 2023-11-07 PROCEDURE — 1159F MED LIST DOCD IN RCRD: CPT | Performed by: NURSE PRACTITIONER

## 2023-11-07 RX ORDER — ONDANSETRON 8 MG/1
8 TABLET, ORALLY DISINTEGRATING ORAL EVERY 12 HOURS PRN
Qty: 6 TABLET | Refills: 5 | Status: SHIPPED | OUTPATIENT
Start: 2023-11-07

## 2023-11-07 RX ORDER — RIMEGEPANT SULFATE 75 MG/75MG
75 TABLET, ORALLY DISINTEGRATING ORAL EVERY OTHER DAY
Qty: 30 TABLET | Refills: 11 | Status: SHIPPED | OUTPATIENT
Start: 2023-11-07

## 2023-11-07 RX ORDER — RIMEGEPANT SULFATE 75 MG/75MG
TABLET, ORALLY DISINTEGRATING ORAL
COMMUNITY
Start: 2023-09-11 | End: 2023-11-07 | Stop reason: SDUPTHER

## 2024-01-08 DIAGNOSIS — R11.2 NAUSEA AND VOMITING, UNSPECIFIED VOMITING TYPE: ICD-10-CM

## 2024-01-08 RX ORDER — ONDANSETRON 8 MG/1
TABLET, ORALLY DISINTEGRATING ORAL
Qty: 6 TABLET | Refills: 5 | Status: SHIPPED | OUTPATIENT
Start: 2024-01-08

## 2024-02-21 ENCOUNTER — SPECIALTY PHARMACY (OUTPATIENT)
Dept: INFUSION THERAPY | Facility: HOSPITAL | Age: 64
End: 2024-02-21
Payer: MEDICARE

## 2024-02-21 NOTE — PROGRESS NOTES
Specialty Pharmacy Refill Coordination Note     Nurtec PA exp 2/28/24- renewal task placed     Renea Lazcano  Specialty Pharmacy Technician

## 2024-03-01 ENCOUNTER — SPECIALTY PHARMACY (OUTPATIENT)
Dept: INFUSION THERAPY | Facility: HOSPITAL | Age: 64
End: 2024-03-01
Payer: MEDICARE

## 2024-03-01 NOTE — PROGRESS NOTES
Specialty Pharmacy Refill Coordination Note     NURTEC IS AVAILABLE WITHOUT A PA PER CMM     Renea Lazcano  Specialty Pharmacy Technician

## 2024-03-21 DIAGNOSIS — R11.2 NAUSEA AND VOMITING, UNSPECIFIED VOMITING TYPE: ICD-10-CM

## 2024-03-22 RX ORDER — ONDANSETRON 8 MG/1
TABLET, ORALLY DISINTEGRATING ORAL
Qty: 6 TABLET | Refills: 5 | Status: SHIPPED | OUTPATIENT
Start: 2024-03-22

## 2024-06-07 NOTE — PROGRESS NOTES
Subjective: Rebound headache and Migirane    Patient ID: Magdalena Gamboa is a 63 y.o. female.    History of Present Illness  Ms. Gamboa is a very pleasant 62-year-old  female who presented with her sister for follow-up on migraine and rebound headaches. H/O old CVA.  She presented today with her younger sister for follow-up.  She states she is doing very well and that her migraines are under very good control.  She has stopped all over-the-counter treatments and is using gabapentin and verapamil, per her PCP, for migraine prevention and states the Nurtec will stop the migraine.  She also states Zofran controls nausea.  She has decided against Botox for chronic migraine as she has controlled it otherwise.      Medication:  Gabapentin and Verapamil for prevention and Nurtec for rescue  Frequency-2 -3 a month  Duration-not long  Change in migraine- same  Has she discontinued all OTC headache  meds? Tylenol, Advil, Excedrin Migraine?- yes    Testing:   IMPRESSION:  Impression:  Stable evolving volume loss from prior right MCA territory infarct, with some evidence of additional anterior DESIREE territory ischemia. There is no evidence of acute ischemia, hemorrhage, mass or mass effect otherwise.  Electronically Signed: José Miguel Coleman    4/21/2023 1:18 AM EDT    Workstation ID: [id number]  [employer]           The following portions of the patient's history were reviewed and updated as appropriate: allergies, current medications, past family history, past medical history, past social history, past surgical history and problem list.    Family History   Problem Relation Age of Onset    Heart disease Sister        Past Medical History:   Diagnosis Date    AMI (acute myocardial infarction)     Chronic back pain     Mood disorder     Stroke     Substance abuse        Social History     Socioeconomic History    Marital status: Legally    Tobacco Use    Smoking status: Every Day     Current packs/day: 1.00     Types:  Cigarettes    Smokeless tobacco: Never    Tobacco comments:     not interested   Vaping Use    Vaping status: Some Days    Substances: Nicotine    Devices: Disposable, Pre-filled or refillable cartridge   Substance and Sexual Activity    Alcohol use: Not Currently    Drug use: Not Currently    Sexual activity: Yes         Current Outpatient Medications:     albuterol sulfate  (90 Base) MCG/ACT inhaler, Inhale 2 puffs Every 4 (Four) Hours As Needed for Wheezing., Disp: , Rfl:     atorvastatin (LIPITOR) 80 MG tablet, Take 1 tablet by mouth Every Night., Disp: , Rfl:     busPIRone (BUSPAR) 10 MG tablet, Take 1 tablet by mouth 3 (Three) Times a Day., Disp: , Rfl:     busPIRone (BUSPAR) 15 MG tablet, TAKE 1 TABLET BY MOUTH THREE TIMES DAILY (IN THE AM AFTERNOON BEDTIME) FOR ANXIETY RESTLESSNESS, Disp: , Rfl:     clopidogrel (PLAVIX) 75 MG tablet, Take 1 tablet by mouth Daily., Disp: 30 tablet, Rfl:     gabapentin (NEURONTIN) 800 MG tablet, Take 1 tablet by mouth 4 (Four) Times a Day., Disp: , Rfl:     HYDROcodone-acetaminophen (NORCO) 5-325 MG per tablet, TAKE 1 TABLET BY MOUTH 4 TIMES DAILY AS NEEDED FOR PAIN, Disp: , Rfl:     nitrofurantoin, macrocrystal-monohydrate, (MACROBID) 100 MG capsule, Take 1 capsule by mouth 2 (Two) Times a Day., Disp: , Rfl:     nortriptyline (PAMELOR) 25 MG capsule, TAKE 1 CAPSULE BY MOUTH AT BEDTIME FOR 30 DAYS, Disp: , Rfl:     Nurtec 75 MG dispersible tablet, Take 1 tablet by mouth Every Other Day., Disp: 30 tablet, Rfl: 11    ondansetron ODT (ZOFRAN-ODT) 8 MG disintegrating tablet, Place 1 tablet on the tongue Every 12 (Twelve) Hours As Needed for Nausea or Vomiting., Disp: 6 tablet, Rfl: 5    sertraline (ZOLOFT) 50 MG tablet, Take 1 tablet by mouth Daily., Disp: , Rfl:     umeclidinium-vilanterol (Anoro Ellipta) 62.5-25 MCG/INH aerosol powder  inhaler, Inhale 1 puff Daily., Disp: , Rfl:     verapamil SR (CALAN-SR) 120 MG CR tablet, Take 1 tablet by mouth Every Night., Disp: ,  Rfl:     Review of Systems   All other systems reviewed and are negative.         I have reviewed ROS completed by medical assistant.     Objective:    Neurologic Exam     Mental Status   Oriented to person, place, and time.   Speech: speech is normal     Cranial Nerves     CN III, IV, VI   Pupils are equal, round, and reactive to light.    Gait, Coordination, and Reflexes     Coordination   Finger to nose coordination: normal    Physical Exam  Vitals reviewed.   Constitutional:       Appearance: Normal appearance. She is well-developed, well-groomed and overweight.   HENT:      Head: Normocephalic and atraumatic.      Right Ear: Hearing normal.      Left Ear: Hearing normal.      Nose: Nose normal.      Mouth/Throat:      Lips: Pink.      Mouth: Mucous membranes are moist.     Eyes:      General: Lids are normal. No visual field deficit.     Pupils: Pupils are equal, round, and reactive to light.   Cardiovascular:      Rate and Rhythm: Normal rate.   Pulmonary:      Effort: Pulmonary effort is normal.   Musculoskeletal:         General: Normal range of motion.        Arms:       Cervical back: Normal range of motion.   Skin:     General: Skin is warm and dry.          Neurological:      Mental Status: She is alert and oriented to person, place, and time.      Cranial Nerves: No dysarthria or facial asymmetry.      Sensory: Sensory deficit (Left hand burning tingling) present.      Motor: Abnormal muscle tone (Contractures in the left hand) present. No weakness, tremor, atrophy, seizure activity or pronator drift.      Coordination: Romberg sign negative. Coordination normal. Finger-Nose-Finger Test and Heel to Shin Test normal. Rapid alternating movements normal.      Gait: Gait normal.   Psychiatric:         Attention and Perception: Attention and perception normal.         Mood and Affect: Mood normal.         Speech: Speech normal.         Behavior: Behavior is cooperative.         Thought Content: Thought  content normal.     Assessment/Plan:  Discussion: The patient has left arm left hand weakness and historically had trouble getting to therapy however now she has a ride.  She asked that I reorder therapy on the left arm left hand at [facility].  I stated that I would.  For migraine the patient will be continued on Nurtec 75 mg max 1 tablet/day for migraine rescue.  For nausea the patient will be continued on Zofran every 12 hours 8 mg.  She will be scheduled back for a follow-up visit in 6 months and is to call the clinic if she has any questions or concerns.      Diagnoses and all orders for this visit:    1. Left hand weakness (Primary)  -     Ambulatory Referral to Physical Therapy    2. Migraine without aura and with status migrainosus, not intractable  -     Nurtec 75 MG dispersible tablet; Take 1 tablet by mouth Every Other Day.  Dispense: 30 tablet; Refill: 11    3. Nausea and vomiting, unspecified vomiting type  -     ondansetron ODT (ZOFRAN-ODT) 8 MG disintegrating tablet; Place 1 tablet on the tongue Every 12 (Twelve) Hours As Needed for Nausea or Vomiting.  Dispense: 6 tablet; Refill: 5          Return in about 1 year (around 6/10/2025), or Aydee Powers.     I spent 32 minutes caring for Magdalena on this date of service. This time includes time spent by me in the following activities: preparing for the visit, reviewing tests, obtaining and/or reviewing a separately obtained history, performing a medically appropriate examination and/or evaluation, counseling and educating the patient/family/caregiver, ordering medications, tests, or procedures, documenting information in the medical record, and independently interpreting results and communicating that information with the patient/family/caregiver.      This document has been electronically signed by LEIF Carrasco on Rosie 10, 2024 11:50 EDT

## 2024-06-10 ENCOUNTER — OFFICE VISIT (OUTPATIENT)
Dept: NEUROLOGY | Facility: CLINIC | Age: 64
End: 2024-06-10
Payer: MEDICARE

## 2024-06-10 VITALS
WEIGHT: 172 LBS | DIASTOLIC BLOOD PRESSURE: 78 MMHG | HEIGHT: 69 IN | HEART RATE: 84 BPM | BODY MASS INDEX: 25.48 KG/M2 | SYSTOLIC BLOOD PRESSURE: 124 MMHG

## 2024-06-10 DIAGNOSIS — R11.2 NAUSEA AND VOMITING, UNSPECIFIED VOMITING TYPE: ICD-10-CM

## 2024-06-10 DIAGNOSIS — G43.001 MIGRAINE WITHOUT AURA AND WITH STATUS MIGRAINOSUS, NOT INTRACTABLE: ICD-10-CM

## 2024-06-10 DIAGNOSIS — R29.898 LEFT HAND WEAKNESS: Primary | ICD-10-CM

## 2024-06-10 PROCEDURE — 99214 OFFICE O/P EST MOD 30 MIN: CPT | Performed by: NURSE PRACTITIONER

## 2024-06-10 PROCEDURE — 1160F RVW MEDS BY RX/DR IN RCRD: CPT | Performed by: NURSE PRACTITIONER

## 2024-06-10 PROCEDURE — 1159F MED LIST DOCD IN RCRD: CPT | Performed by: NURSE PRACTITIONER

## 2024-06-10 PROCEDURE — 3078F DIAST BP <80 MM HG: CPT | Performed by: NURSE PRACTITIONER

## 2024-06-10 PROCEDURE — 3074F SYST BP LT 130 MM HG: CPT | Performed by: NURSE PRACTITIONER

## 2024-06-10 RX ORDER — ONDANSETRON 8 MG/1
8 TABLET, ORALLY DISINTEGRATING ORAL EVERY 12 HOURS PRN
Qty: 6 TABLET | Refills: 5 | Status: SHIPPED | OUTPATIENT
Start: 2024-06-10

## 2024-06-10 RX ORDER — RIMEGEPANT SULFATE 75 MG/75MG
75 TABLET, ORALLY DISINTEGRATING ORAL EVERY OTHER DAY
Qty: 30 TABLET | Refills: 11 | Status: SHIPPED | OUTPATIENT
Start: 2024-06-10

## 2024-06-11 ENCOUNTER — TELEPHONE (OUTPATIENT)
Dept: NEUROLOGY | Facility: CLINIC | Age: 64
End: 2024-06-11
Payer: MEDICARE

## 2024-06-11 DIAGNOSIS — R29.898 LEFT HAND WEAKNESS: Primary | ICD-10-CM

## 2024-06-11 NOTE — TELEPHONE ENCOUNTER
Called [facility] in [place] - Please put in OT referral instead of PT referral.    The patient is scheduled

## 2024-08-12 DIAGNOSIS — R11.2 NAUSEA AND VOMITING, UNSPECIFIED VOMITING TYPE: ICD-10-CM

## 2024-08-13 RX ORDER — ONDANSETRON 8 MG/1
TABLET, ORALLY DISINTEGRATING ORAL
Qty: 6 TABLET | Refills: 5 | Status: SHIPPED | OUTPATIENT
Start: 2024-08-13

## 2025-01-14 DIAGNOSIS — R11.2 NAUSEA AND VOMITING, UNSPECIFIED VOMITING TYPE: ICD-10-CM

## 2025-01-14 RX ORDER — ONDANSETRON 8 MG/1
TABLET, ORALLY DISINTEGRATING ORAL
Qty: 6 TABLET | Refills: 5 | Status: SHIPPED | OUTPATIENT
Start: 2025-01-14

## 2025-06-10 ENCOUNTER — TELEPHONE (OUTPATIENT)
Dept: NEUROLOGY | Facility: CLINIC | Age: 65
End: 2025-06-10

## 2025-06-10 NOTE — TELEPHONE ENCOUNTER
Caller: Magdalena Gamboa    Relationship:  Self    Best call back number: [phone]    PATIENT CALLED REQUESTING TO CANCEL SAME DAY APPT.    Did the patient call AFTER the start time of their scheduled appointment?  []YES  [x]NO    Was the patient's appointment rescheduled? [x]YES  []NO

## 2025-06-16 DIAGNOSIS — G43.001 MIGRAINE WITHOUT AURA AND WITH STATUS MIGRAINOSUS, NOT INTRACTABLE: ICD-10-CM

## 2025-06-16 RX ORDER — RIMEGEPANT SULFATE 75 MG/75MG
TABLET, ORALLY DISINTEGRATING ORAL
Qty: 15 TABLET | Refills: 12 | Status: SHIPPED | OUTPATIENT
Start: 2025-06-16

## 2025-06-17 ENCOUNTER — SPECIALTY PHARMACY (OUTPATIENT)
Dept: INFUSION THERAPY | Facility: HOSPITAL | Age: 65
End: 2025-06-17
Payer: OTHER GOVERNMENT

## 2025-06-17 NOTE — PROGRESS NOTES
Specialty Pharmacy Patient Management Program  Refill Outreach     6/17: Nurtec PA submitted/pending  [id number]        Yudith Lechuga, Pharmacy Technician  6/17/2025  12:05 EDT

## 2025-06-18 ENCOUNTER — SPECIALTY PHARMACY (OUTPATIENT)
Dept: INFUSION THERAPY | Facility: HOSPITAL | Age: 65
End: 2025-06-18
Payer: OTHER GOVERNMENT

## 2025-06-18 NOTE — PROGRESS NOTES
Specialty Pharmacy Patient Management Program  Refill Outreach     [facility] PA approved until 12/31/25 [id number]     Yudith Lechuga, Pharmacy Technician  6/18/2025  08:19 EDT